# Patient Record
Sex: MALE | Race: WHITE | NOT HISPANIC OR LATINO | Employment: OTHER | URBAN - METROPOLITAN AREA
[De-identification: names, ages, dates, MRNs, and addresses within clinical notes are randomized per-mention and may not be internally consistent; named-entity substitution may affect disease eponyms.]

---

## 2017-03-16 DIAGNOSIS — N20.0 KIDNEY STONE: Primary | ICD-10-CM

## 2017-03-17 ENCOUNTER — TELEPHONE (OUTPATIENT)
Dept: PHARMACY | Facility: CLINIC | Age: 77
End: 2017-03-17

## 2017-03-17 NOTE — TELEPHONE ENCOUNTER
Called and spoke to patient to schedule MTM visit. He would like to meet with MTM annually. Will call to schedule appointment in October.     Tamra Israel, PharmD  [place] MTM Resident  [phone]

## 2017-05-10 ENCOUNTER — HOSPITAL ENCOUNTER (OUTPATIENT)
Dept: GENERAL RADIOLOGY | Facility: CLINIC | Age: 77
Discharge: HOME | End: 2017-05-10
Attending: UROLOGY | Admitting: UROLOGY
Payer: MEDICARE

## 2017-05-10 ENCOUNTER — OFFICE VISIT (OUTPATIENT)
Dept: UROLOGY | Facility: CLINIC | Age: 77
End: 2017-05-10
Payer: COMMERCIAL

## 2017-05-10 VITALS
WEIGHT: 165 LBS | SYSTOLIC BLOOD PRESSURE: 126 MMHG | HEIGHT: 68 IN | HEART RATE: 74 BPM | BODY MASS INDEX: 25.01 KG/M2 | DIASTOLIC BLOOD PRESSURE: 66 MMHG

## 2017-05-10 DIAGNOSIS — Z87.442 HISTORY OF RENAL CALCULI: ICD-10-CM

## 2017-05-10 DIAGNOSIS — N20.0 KIDNEY STONE: ICD-10-CM

## 2017-05-10 DIAGNOSIS — Z87.442 HISTORY OF RENAL CALCULI: Primary | ICD-10-CM

## 2017-05-10 LAB
ALBUMIN UR-MCNC: 30 MG/DL
APPEARANCE UR: CLEAR
BILIRUB UR QL STRIP: NEGATIVE
COLOR UR AUTO: YELLOW
GLUCOSE UR STRIP-MCNC: NEGATIVE MG/DL
HGB UR QL STRIP: NEGATIVE
KETONES UR STRIP-MCNC: NEGATIVE MG/DL
LEUKOCYTE ESTERASE UR QL STRIP: NEGATIVE
NITRATE UR QL: NEGATIVE
PH UR STRIP: 5.5 PH (ref 5–7)
SP GR UR STRIP: 1.02 (ref 1–1.03)
URN SPEC COLLECT METH UR: ABNORMAL
UROBILINOGEN UR STRIP-ACNC: 0.2 EU/DL (ref 0.2–1)

## 2017-05-10 PROCEDURE — 99212 OFFICE O/P EST SF 10 MIN: CPT | Performed by: UROLOGY

## 2017-05-10 PROCEDURE — 81003 URINALYSIS AUTO W/O SCOPE: CPT | Performed by: UROLOGY

## 2017-05-10 PROCEDURE — 74010 XR KUB: CPT | Mod: 52

## 2017-05-10 RX ORDER — FENOFIBRATE 134 MG/1
CAPSULE ORAL
Refills: 1 | COMMUNITY
Start: 2017-03-06 | End: 2018-01-15

## 2017-05-10 ASSESSMENT — PAIN SCALES - GENERAL: PAINLEVEL: NO PAIN (0)

## 2017-05-10 NOTE — MR AVS SNAPSHOT
"              After Visit Summary   5/10/2017    Michael [facility]    MRN: [id number]           Patient Information     Date Of Birth          1940        Visit Information        Provider Department      5/10/2017 2:50 PM Giovanny Ross MD [facility] Urology [facility]        Today's Diagnoses     History of renal calculi           Follow-ups after your visit        Follow-up notes from your care team     Return in about 1 year (around 5/10/2018) for KUB.      Future tests that were ordered for you today     Open Future Orders        Priority Expected Expires Ordered    XR KUB [[id number]] Routine 5/10/2018 5/10/2018 5/10/2017            Who to contact     If you have questions or need follow up information about today's clinic visit or your schedule please contact [facility] UROLOGY [facility] directly at [phone].  Normal or non-critical lab and imaging results will be communicated to you by MyChart, letter or phone within 4 business days after the clinic has received the results. If you do not hear from us within 7 days, please contact the clinic through [employer] or phone. If you have a critical or abnormal lab result, we will notify you by phone as soon as possible.  Submit refill requests through [employer] or call your pharmacy and they will forward the refill request to us. Please allow 3 business days for your refill to be completed.          Additional Information About Your Visit        MyChart Information     [employer] lets you send messages to your doctor, view your test results, renew your prescriptions, schedule appointments and more. To sign up, go to [email or web address] . Click on \"Log in\" on the left side of the screen, which will take you to the Welcome page. Then click on \"Sign up Now\" on the right side of the page.     You will be asked to enter the access code listed below, as well as some personal information. Please follow the directions to " "create your username and password.     Your access code is: [email or web address]  Expires: 2017  3:18 PM     Your access code will  in 90 days. If you need help or a new code, please call your [facility] or [phone].        Care EveryWhere ID     This is your Care EveryWhere ID. This could be used by other organizations to access your [place] medical records  [id number]        Your Vitals Were     Pulse Height BMI (Body Mass Index)             74 1.727 m (5' 8\") 25.09 kg/m2          Blood Pressure from Last 3 Encounters:   05/10/17 126/66   11/10/16 138/64   10/14/16 122/68    Weight from Last 3 Encounters:   05/10/17 74.8 kg (165 lb)   11/10/16 75 kg (165 lb 6 oz)   10/14/16 77.6 kg (171 lb)              We Performed the Following     UA without Microscopic        Primary Care Provider Office Phone # Fax #    Eric Sutton [phone]       [facility] [address]  [facility] [address]        Thank you!     Thank you for choosing [facility]OLOG[facility] CLIN[facility] [place]  for your care. Our goal is always to provide you with excellent care. Hearing back from our patients is one way we can continue to improve our services. Please take a few minutes to complete the written survey that you may receive in the mail after your visit with us. Thank you!             Your Updated Medication List - Protect others around you: Learn how to safely use, store and throw away your medicines at www.disposemymeds.org.          This list is accurate as of: 5/10/17  3:18 PM.  Always use your most recent med list.                   Brand Name Dispense Instructions for use    AMLODIPINE BESYLATE PO      Take 2.5 mg by mouth At Bedtime       ASPIRIN PO      Take 81 mg by mouth daily       CITALOPRAM HYDROBROMIDE PO      Take 20 mg by mouth daily.       CRESTOR PO      Take 40 mg by mouth At Bedtime       Fenofibrate Micronized 134 MG Caps      TK 1 C PO D       FENOFIBRATE " PO      Take 134 mg by mouth daily       HYDROCHLOROTHIAZIDE PO      Take 25 mg by mouth daily       LISINOPRIL PO      Take 40 mg by mouth daily.       METFORMIN HCL PO      Take 1,000 mg by mouth 2 times daily (with meals).       OMEPRAZOLE PO      Take 20 mg by mouth every morning       RANITIDINE HCL PO      Take 300 mg by mouth At Bedtime.

## 2017-05-10 NOTE — LETTER
5/10/2017     RE: Michael Howard  [address] [place] [address]     Dear Colleague,    Thank you for referring your patient, Michael [facility], to the [facility] [place] at [facility]. Please see a copy of my visit note below.    Michael [facility] is a 76-year-old male with a history of calcium urolithiasis. He underwent TURP for benign disease last year and is voiding well. His KUB shows a 3 mm stone in the mid to lower pole on the left side and 2 stones in the peripheral calyces on the right side. He is having no hematuria or flank pain.  Other past medical history: Benign colon polyps, arrhythmia, Wright esophagus, cataracts, chronic renal disease, diabetes, diverticulitis, Dupuytren's contracture, GERD, hyperlipidemia, hypertension, history of depression, right inguinal hernia, nonsmoker  Medications: Amlodipine, aspirin, citalopram, fenofibrate, hydrochlorothiazide, lisinopril, metformin, omeprazole, ranitidine, Crestor  Allergies: Cipro  Exam: Normal appearance, normal vital signs, alert and oriented, normocephalic, normal respirations, neuro grossly intact. Normal sphincter tone, no rectal mass or impaction, benign prostate, normal seminal vesicles  Urinalysis: pH 5.5, sp gr 1.025  Assessment: History of calcium oxalate monohydrate stones, BPH, erectile dysfunction  Plan: Levitra 20 mg-one half-1 p.o. on demand            Follow-up with KUB in 1 year            Yearly digital rectal exam    Again, thank you for allowing me to participate in the care of your patient.      Sincerely,    Giovanny Ross MD

## 2017-05-10 NOTE — NURSING NOTE
Chief Complaint   Patient presents with     Clinic Care Coordination - Follow-up     History of Renal Calculi     Marcela Armstrong LPN

## 2017-05-10 NOTE — PROGRESS NOTES
Michael [facility] is a 76-year-old male with a history of calcium urolithiasis. He underwent TURP for benign disease last year and is voiding well. His KUB shows a 3 mm stone in the mid to lower pole on the left side and 2 stones in the peripheral calyces on the right side. He is having no hematuria or flank pain.  Other past medical history: Benign colon polyps, arrhythmia, Wright esophagus, cataracts, chronic renal disease, diabetes, diverticulitis, Dupuytren's contracture, GERD, hyperlipidemia, hypertension, history of depression, right inguinal hernia, nonsmoker  Medications: Amlodipine, aspirin, citalopram, fenofibrate, hydrochlorothiazide, lisinopril, metformin, omeprazole, ranitidine, Crestor  Allergies: Cipro  Exam: Normal appearance, normal vital signs, alert and oriented, normocephalic, normal respirations, neuro grossly intact. Normal sphincter tone, no rectal mass or impaction, benign prostate, normal seminal vesicles  Urinalysis: pH 5.5, sp gr 1.025  Assessment: History of calcium oxalate monohydrate stones, BPH, erectile dysfunction  Plan: Levitra 20 mg-one half-1 p.o. on demand            Follow-up with KUB in 1 year            Yearly digital rectal exam

## 2017-07-05 ENCOUNTER — TELEPHONE (OUTPATIENT)
Dept: UROLOGY | Facility: CLINIC | Age: 77
End: 2017-07-05

## 2017-07-05 NOTE — TELEPHONE ENCOUNTER
Returning patient's phone call.  He spoke with the schedulers about making an appointment with Dr. Ross.  He can not get in for almost one month.  I recommended he go to his primary MD.  Lynda Schuler LPN

## 2017-07-28 ENCOUNTER — HOSPITAL ENCOUNTER (EMERGENCY)
Facility: CLINIC | Age: 77
Discharge: HOME | End: 2017-07-28
Attending: EMERGENCY MEDICINE | Admitting: EMERGENCY MEDICINE
Payer: MEDICARE

## 2017-07-28 ENCOUNTER — APPOINTMENT (OUTPATIENT)
Dept: CT IMAGING | Facility: CLINIC | Age: 77
End: 2017-07-28
Attending: EMERGENCY MEDICINE
Payer: MEDICARE

## 2017-07-28 VITALS
OXYGEN SATURATION: 99 % | RESPIRATION RATE: 16 BRPM | SYSTOLIC BLOOD PRESSURE: 141 MMHG | DIASTOLIC BLOOD PRESSURE: 65 MMHG | HEART RATE: 72 BPM | TEMPERATURE: 98.6 F

## 2017-07-28 DIAGNOSIS — N20.1 CALCULUS OF RIGHT URETER: ICD-10-CM

## 2017-07-28 DIAGNOSIS — N28.9 RENAL INSUFFICIENCY: ICD-10-CM

## 2017-07-28 LAB
ALBUMIN UR-MCNC: 30 MG/DL
ANION GAP SERPL CALCULATED.3IONS-SCNC: 12 MMOL/L (ref 3–14)
APPEARANCE UR: CLEAR
BASOPHILS # BLD AUTO: 0 10E9/L (ref 0–0.2)
BASOPHILS NFR BLD AUTO: 0.4 %
BILIRUB UR QL STRIP: NEGATIVE
BUN SERPL-MCNC: 37 MG/DL (ref 7–30)
CALCIUM SERPL-MCNC: 9.8 MG/DL (ref 8.5–10.1)
CHLORIDE SERPL-SCNC: 103 MMOL/L (ref 94–109)
CO2 SERPL-SCNC: 23 MMOL/L (ref 20–32)
COLOR UR AUTO: ABNORMAL
CREAT SERPL-MCNC: 1.95 MG/DL (ref 0.66–1.25)
DIFFERENTIAL METHOD BLD: ABNORMAL
EOSINOPHIL # BLD AUTO: 0 10E9/L (ref 0–0.7)
EOSINOPHIL NFR BLD AUTO: 0.3 %
ERYTHROCYTE [DISTWIDTH] IN BLOOD BY AUTOMATED COUNT: 13.7 % (ref 10–15)
GFR SERPL CREATININE-BSD FRML MDRD: 34 ML/MIN/1.7M2
GLUCOSE SERPL-MCNC: 124 MG/DL (ref 70–99)
GLUCOSE UR STRIP-MCNC: NEGATIVE MG/DL
HCT VFR BLD AUTO: 35 % (ref 40–53)
HGB BLD-MCNC: 12 G/DL (ref 13.3–17.7)
HGB UR QL STRIP: ABNORMAL
IMM GRANULOCYTES # BLD: 0 10E9/L (ref 0–0.4)
IMM GRANULOCYTES NFR BLD: 0.3 %
KETONES UR STRIP-MCNC: NEGATIVE MG/DL
LEUKOCYTE ESTERASE UR QL STRIP: NEGATIVE
LYMPHOCYTES # BLD AUTO: 0.7 10E9/L (ref 0.8–5.3)
LYMPHOCYTES NFR BLD AUTO: 6.6 %
MCH RBC QN AUTO: 29.1 PG (ref 26.5–33)
MCHC RBC AUTO-ENTMCNC: 34.3 G/DL (ref 31.5–36.5)
MCV RBC AUTO: 85 FL (ref 78–100)
MONOCYTES # BLD AUTO: 0.7 10E9/L (ref 0–1.3)
MONOCYTES NFR BLD AUTO: 6.5 %
MUCOUS THREADS #/AREA URNS LPF: PRESENT /LPF
NEUTROPHILS # BLD AUTO: 8.9 10E9/L (ref 1.6–8.3)
NEUTROPHILS NFR BLD AUTO: 85.9 %
NITRATE UR QL: NEGATIVE
NRBC # BLD AUTO: 0 10*3/UL
NRBC BLD AUTO-RTO: 0 /100
PH UR STRIP: 5.5 PH (ref 5–7)
PLATELET # BLD AUTO: 299 10E9/L (ref 150–450)
POTASSIUM SERPL-SCNC: 3.9 MMOL/L (ref 3.4–5.3)
RBC # BLD AUTO: 4.13 10E12/L (ref 4.4–5.9)
RBC #/AREA URNS AUTO: 2 /HPF (ref 0–2)
SODIUM SERPL-SCNC: 138 MMOL/L (ref 133–144)
SP GR UR STRIP: 1.01 (ref 1–1.03)
URN SPEC COLLECT METH UR: ABNORMAL
UROBILINOGEN UR STRIP-MCNC: NORMAL MG/DL (ref 0–2)
WBC # BLD AUTO: 10.4 10E9/L (ref 4–11)
WBC #/AREA URNS AUTO: 1 /HPF (ref 0–2)

## 2017-07-28 PROCEDURE — 80048 BASIC METABOLIC PNL TOTAL CA: CPT | Performed by: EMERGENCY MEDICINE

## 2017-07-28 PROCEDURE — 99285 EMERGENCY DEPT VISIT HI MDM: CPT | Mod: 25

## 2017-07-28 PROCEDURE — 85025 COMPLETE CBC W/AUTO DIFF WBC: CPT | Performed by: EMERGENCY MEDICINE

## 2017-07-28 PROCEDURE — 96360 HYDRATION IV INFUSION INIT: CPT

## 2017-07-28 PROCEDURE — 25000128 H RX IP 250 OP 636: Performed by: EMERGENCY MEDICINE

## 2017-07-28 PROCEDURE — 74176 CT ABD & PELVIS W/O CONTRAST: CPT

## 2017-07-28 PROCEDURE — 81001 URINALYSIS AUTO W/SCOPE: CPT | Performed by: EMERGENCY MEDICINE

## 2017-07-28 RX ORDER — TAMSULOSIN HYDROCHLORIDE 0.4 MG/1
0.4 CAPSULE ORAL DAILY
Qty: 7 CAPSULE | Refills: 0 | Status: SHIPPED | OUTPATIENT
Start: 2017-07-28 | End: 2017-08-04

## 2017-07-28 RX ORDER — SODIUM CHLORIDE 9 MG/ML
1000 INJECTION, SOLUTION INTRAVENOUS CONTINUOUS
Status: DISCONTINUED | OUTPATIENT
Start: 2017-07-28 | End: 2017-07-28 | Stop reason: HOSPADM

## 2017-07-28 RX ORDER — OXYCODONE AND ACETAMINOPHEN 5; 325 MG/1; MG/1
1-2 TABLET ORAL EVERY 6 HOURS PRN
Qty: 21 TABLET | Refills: 0 | Status: SHIPPED | OUTPATIENT
Start: 2017-07-28 | End: 2018-01-15

## 2017-07-28 RX ADMIN — SODIUM CHLORIDE 1000 ML: 9 INJECTION, SOLUTION INTRAVENOUS at 13:06

## 2017-07-28 ASSESSMENT — ENCOUNTER SYMPTOMS
NAUSEA: 0
FEVER: 0
HEMATURIA: 0
FLANK PAIN: 1
VOMITING: 0
FREQUENCY: 0
ABDOMINAL PAIN: 1
DYSURIA: 0

## 2017-07-28 NOTE — DISCHARGE INSTRUCTIONS
Treating Kidney Stones: Expectant Therapy  Most kidney stones are about the size of a grape seed. Stones of this size are small enough to pass naturally. Once it is passed, a stone can be analyzed. This wait-and-see approach is called expectant therapy. Small stones can often be passed with expectant therapy. If pain is a problem, ask your healthcare provider about pain medicines. Then follow his or her directions on how much water to drink. Drinking more water creates more urine to flush out your stone. Also be sure to strain your urine. Take any stones you pass to your provider for analysis.    Drink lots of water  Drinking lots of water may help your stone pass. Water also dilutes the chemicals in your urine. This reduces your risk of forming new stones. You may be told to drink 8, 12-ounce glasses of water a day. Avoid liquids that dehydrate you, such as those containing caffeine or alcohol.  Strain your urine  Straining your urine lets you collect your stone for analysis. Use the strainer each time you urinate. Strain your urine for as long as your healthcare provider suggests. Watch for brown, tan, gold, or black specks or tiny leena. These may be kidney stones.  Take your medicine  Your healthcare provider may give you medicine that makes it more likely for you to pass the kidney stone.   Follow up with your healthcare provider  Follow up by taking any stones you find to your provider for analysis. The type of stone you have determines your diet and prevention program. You may need more tests in the future. These tests will ensure that new stones are not forming.  Date Last Reviewed: 1/1/2017 2000-2017 [employer]. [address] [place] [address], [place] [address]. All rights reserved. This information is not intended as a substitute for professional medical care. Always follow your healthcare professional's instructions.

## 2017-07-28 NOTE — ED AVS SNAPSHOT
Emergency Department    [address] [facility] [address]    Phone:  [phone]    Fax:  [phone]                                       Michael [facility]   MRN: [id number]    Department:   Emergency Department   Date of Visit:  7/28/2017           Patient Information     Date Of Birth          1940        Your diagnoses for this visit were:     Calculus of right ureter     Renal insufficiency        You were seen by Michael Ta DO.      Follow-up Information     Follow up with Giovanny Ross MD In 3 days.    Specialty:  Urology    Contact information:    [facility] UROLOGY  [address] [facility] [address] [address] [phone]          Follow up with  Emergency Department.    Specialty:  EMERGENCY MEDICINE    Why:  If symptoms worsen    Contact information:    [address] [facility] [address] [phone]        Discharge Instructions         Treating Kidney Stones: Expectant Therapy  Most kidney stones are about the size of a grape seed. Stones of this size are small enough to pass naturally. Once it is passed, a stone can be analyzed. This wait-and-see approach is called expectant therapy. Small stones can often be passed with expectant therapy. If pain is a problem, ask your healthcare provider about pain medicines. Then follow his or her directions on how much water to drink. Drinking more water creates more urine to flush out your stone. Also be sure to strain your urine. Take any stones you pass to your provider for analysis.    Drink lots of water  Drinking lots of water may help your stone pass. Water also dilutes the chemicals in your urine. This reduces your risk of forming new stones. You may be told to drink 8, 12-ounce glasses of water a day. Avoid liquids that dehydrate you, such as those containing caffeine or alcohol.  Strain your urine  Straining your urine lets you collect your stone for analysis. Use the strainer each time  you urinate. Strain your urine for as long as your healthcare provider suggests. Watch for brown, tan, gold, or black specks or tiny leena. These may be kidney stones.  Take your medicine  Your healthcare provider may give you medicine that makes it more likely for you to pass the kidney stone.   Follow up with your healthcare provider  Follow up by taking any stones you find to your provider for analysis. The type of stone you have determines your diet and prevention program. You may need more tests in the future. These tests will ensure that new stones are not forming.  Date Last Reviewed: 1/1/2017 2000-2017 [employer]. [address] [place], [place] [address]. All rights reserved. This information is not intended as a substitute for professional medical care. Always follow your healthcare professional's instructions.          Future Appointments        Provider Department Dept Phone Center    8/2/2017 2:50 PM Giovanny Ross MD [facility]rology [facility] [place] [phone] [facility]      24 Hour Appointment Hotline       To make an appointment at any [facility], call [phone] ([phone]). If you don't have a family doctor or clinic, we will help you find one. [facility] are conveniently located to serve the needs of you and your family.             Review of your medicines      START taking        Dose / Directions Last dose taken    oxyCODONE-acetaminophen 5-325 MG per tablet   Commonly known as:  PERCOCET   Dose:  1-2 tablet   Quantity:  21 tablet        Take 1-2 tablets by mouth every 6 hours as needed for moderate to severe pain   Refills:  0        tamsulosin 0.4 MG capsule   Commonly known as:  FLOMAX   Dose:  0.4 mg   Quantity:  7 capsule        Take 1 capsule (0.4 mg) by mouth daily for 7 doses   Refills:  0          Our records show that you are taking the medicines listed below. If these are incorrect, please call your family doctor or  clinic.        Dose / Directions Last dose taken    AMLODIPINE BESYLATE PO   Dose:  2.5 mg        Take 2.5 mg by mouth At Bedtime   Refills:  0        ASPIRIN PO   Dose:  81 mg        Take 81 mg by mouth daily   Refills:  0        CITALOPRAM HYDROBROMIDE PO   Dose:  20 mg        Take 20 mg by mouth daily.   Refills:  0        CRESTOR PO   Dose:  40 mg        Take 40 mg by mouth At Bedtime   Refills:  0        Fenofibrate Micronized 134 MG Caps        TK 1 C PO D   Refills:  1        FENOFIBRATE PO   Dose:  134 mg        Take 134 mg by mouth daily   Refills:  0        HYDROCHLOROTHIAZIDE PO   Dose:  25 mg        Take 25 mg by mouth daily   Refills:  0        LISINOPRIL PO   Dose:  40 mg        Take 40 mg by mouth daily.   Refills:  0        METFORMIN HCL PO   Dose:  1000 mg        Take 1,000 mg by mouth 2 times daily (with meals).   Refills:  0        OMEPRAZOLE PO   Dose:  20 mg        Take 20 mg by mouth every morning   Refills:  0        RANITIDINE HCL PO   Dose:  300 mg        Take 300 mg by mouth At Bedtime.   Refills:  0                Prescriptions were sent or printed at these locations (2 Prescriptions)                   Other Prescriptions                Printed at Department/Unit printer (2 of 2)         tamsulosin (FLOMAX) 0.4 MG capsule               oxyCODONE-acetaminophen (PERCOCET) 5-325 MG per tablet                Procedures and tests performed during your visit     Abd/pelvis CT no contrast - Stone Protocol    Basic metabolic panel    CBC with platelets differential    UA with Microscopic reflex to Culture      Orders Needing Specimen Collection     None      Pending Results     No orders found from 7/26/2017 to 7/29/2017.            Pending Culture Results     No orders found from 7/26/2017 to 7/29/2017.            Pending Results Instructions     If you had any lab results that were not finalized at the time of your Discharge, you can call the ED Lab Result RN at [phone]. You will be  contacted by this team for any positive Lab results or changes in treatment. The nurses are available 7 days a week from 10A to 6:30P.  You can leave a message 24 hours per day and they will return your call.        Test Results From Your Hospital Stay        7/28/2017 12:55 PM      Component Results     Component Value Ref Range & Units Status    Color Urine Light Yellow  Final    Appearance Urine Clear  Final    Glucose Urine Negative NEG mg/dL Final    Bilirubin Urine Negative NEG Final    Ketones Urine Negative NEG mg/dL Final    Specific Gravity Urine 1.012 1.003 - 1.035 Final    Blood Urine Trace (A) NEG Final    pH Urine 5.5 5.0 - 7.0 pH Final    Protein Albumin Urine 30 (A) NEG mg/dL Final    Urobilinogen mg/dL Normal 0.0 - 2.0 mg/dL Final    Nitrite Urine Negative NEG Final    Leukocyte Esterase Urine Negative NEG Final    Source Midstream Urine  Final    WBC Urine 1 0 - 2 /HPF Final    RBC Urine 2 0 - 2 /HPF Final    Mucous Urine Present (A) NEG /LPF Final         7/28/2017  1:18 PM      Component Results     Component Value Ref Range & Units Status    WBC 10.4 4.0 - 11.0 10e9/L Final    RBC Count 4.13 (L) 4.4 - 5.9 10e12/L Final    Hemoglobin 12.0 (L) 13.3 - 17.7 g/dL Final    Hematocrit 35.0 (L) 40.0 - 53.0 % Final    MCV 85 78 - 100 fl Final    MCH 29.1 26.5 - 33.0 pg Final    MCHC 34.3 31.5 - 36.5 g/dL Final    RDW 13.7 10.0 - 15.0 % Final    Platelet Count 299 150 - 450 10e9/L Final    Diff Method Automated Method  Final    % Neutrophils 85.9 % Final    % Lymphocytes 6.6 % Final    % Monocytes 6.5 % Final    % Eosinophils 0.3 % Final    % Basophils 0.4 % Final    % Immature Granulocytes 0.3 % Final    Nucleated RBCs 0 0 /100 Final    Absolute Neutrophil 8.9 (H) 1.6 - 8.3 10e9/L Final    Absolute Lymphocytes 0.7 (L) 0.8 - 5.3 10e9/L Final    Absolute Monocytes 0.7 0.0 - 1.3 10e9/L Final    Absolute Eosinophils 0.0 0.0 - 0.7 10e9/L Final    Absolute Basophils 0.0 0.0 - 0.2 10e9/L Final    Abs Immature  Granulocytes 0.0 0 - 0.4 10e9/L Final    Absolute Nucleated RBC 0.0  Final         7/28/2017  1:32 PM      Component Results     Component Value Ref Range & Units Status    Sodium 138 133 - 144 mmol/L Final    Potassium 3.9 3.4 - 5.3 mmol/L Final    Chloride 103 94 - 109 mmol/L Final    Carbon Dioxide 23 20 - 32 mmol/L Final    Anion Gap 12 3 - 14 mmol/L Final    Glucose 124 (H) 70 - 99 mg/dL Final    Urea Nitrogen 37 (H) 7 - 30 mg/dL Final    Creatinine 1.95 (H) 0.66 - 1.25 mg/dL Final    GFR Estimate 34 (L) >60 mL/min/1.7m2 Final    Non  GFR Calc    GFR Estimate If Black 41 (L) >60 mL/min/1.7m2 Final    African American GFR Calc    Calcium 9.8 8.5 - 10.1 mg/dL Final         7/28/2017  1:47 PM      Narrative     CT ABDOMEN AND PELVIS WITHOUT CONTRAST   7/28/2017 1:17 PM     HISTORY: Right flank pain.    COMPARISON: None.    TECHNIQUE: Without intravenous or oral contrast, helical sections were  acquired from the top of the diaphragm through the pubic symphysis.  Coronal reconstructions were generated. Radiation dose for this scan  was reduced using automated exposure control, adjustment of the mA  and/or kV according to the patient's size, or iterative reconstruction  technique. (Renal stone protocol)    FINDINGS:     Right urinary tract: 0.4 cm calculus in the most distal aspect of the  ureter at the ureterovesicular junction. Mild-to-moderate dilatation  of the intrarenal collecting system and ureter. Moderate perinephric  edema. 0.2 cm nonobstructing calculus in the inferior pole of the  kidney.    Left urinary tract: Two tiny 0.2 cm nonobstructing calculi in the  inferior pole of the kidney. No ureteral calculi. No dilatation of the  intrarenal collecting system or ureter.    Urinary bladder: No visualized calculi. Moderate enlargement of the  prostate gland.    Remainder of the abdomen and pelvis: The liver, spleen, pancreas and  adrenal glands are unremarkable to the limits of a noncontrast  CT  scan. A few tiny gallstones in the gallbladder. Moderate-sized hiatal  hernia. The small and large bowel are normal in caliber. A few  diverticula are present in the colon without evidence of  diverticulitis. The appendix is not visualized. No bowel wall  thickening, pneumatosis or free intraperitoneal gas. No enlarged lymph  nodes or free fluid in the pelvis. Atherosclerotic calcification in  the abdominal aorta.    Scan through the lower chest is unremarkable.        Impression     IMPRESSION:   1. 0.4 cm distal right ureteral calculus, resulting in  mild-to-moderate obstruction.  2. A few tiny nonobstructing bilateral renal calculi.  3. Colonic diverticulosis without evidence of diverticulitis.    DESTINY JIMENEZ MD                Clinical Quality Measure: Blood Pressure Screening     Your blood pressure was checked while you were in the emergency department today. The last reading we obtained was  BP: 141/65 . Please read the guidelines below about what these numbers mean and what you should do about them.  If your systolic blood pressure (the top number) is less than 120 and your diastolic blood pressure (the bottom number) is less than 80, then your blood pressure is normal. There is nothing more that you need to do about it.  If your systolic blood pressure (the top number) is 120-139 or your diastolic blood pressure (the bottom number) is 80-89, your blood pressure may be higher than it should be. You should have your blood pressure rechecked within a year by a primary care provider.  If your systolic blood pressure (the top number) is 140 or greater or your diastolic blood pressure (the bottom number) is 90 or greater, you may have high blood pressure. High blood pressure is treatable, but if left untreated over time it can put you at risk for heart attack, stroke, or kidney failure. You should have your blood pressure rechecked by a primary care provider within the next 4 weeks.  If your provider in  "the emergency department today gave you specific instructions to follow-up with your doctor or provider even sooner than that, you should follow that instruction and not wait for up to 4 weeks for your follow-up visit.        Thank you for choosing [facility]       Thank you for choosing [facility] for your care. Our goal is always to provide you with excellent care. Hearing back from our patients is one way we can continue to improve our services. Please take a few minutes to complete the written survey that you may receive in the mail after you visit with us. Thank you!        [employer] Information     [employer] lets you send messages to your doctor, view your test results, renew your prescriptions, schedule appointments and more. To sign up, go to [email or web address] . Click on \"Log in\" on the left side of the screen, which will take you to the Welcome page. Then click on \"Sign up Now\" on the right side of the page.     You will be asked to enter the access code listed below, as well as some personal information. Please follow the directions to create your username and password.     Your access code is: [email or web address]  Expires: 2017  3:18 PM     Your access code will  in 90 days. If you need help or a new code, please call your [facility] clinic or [phone].        Care EveryWhere ID     This is your Care EveryWhere ID. This could be used by other organizations to access your [facility] medical records  [id number]        Equal Access to Services     KAMILLA TOWNSEND : Hadii em benitezo Soelvis, waaxda luqadaha, qaybta kaalmada adeegyada, monica hilliard . So [facility] [phone].    ATENCIÓN: Si habla español, tiene a frost disposición servicios gratuitos de asistencia lingüística. Artis al [phone].    We comply with applicable federal civil rights laws and [place] laws. We do not discriminate on the basis of race, color, national origin, age, disability sex, sexual orientation or " gender identity.            After Visit Summary       This is your record. Keep this with you and show to your community pharmacist(s) and doctor(s) at your next visit.

## 2017-07-28 NOTE — ED AVS SNAPSHOT
Emergency Department    [address] [place] [address]    Phone:  [phone]    Fax:  [phone]                                       Michael Howard   MRN: [id number]    Department:   Emergency Department   Date of Visit:  7/28/2017           After Visit Summary Signature Page     I have received my discharge instructions, and my questions have been answered. I have discussed any challenges I see with this plan with the nurse or doctor.    ..........................................................................................................................................  Patient/Patient Representative Signature      ..........................................................................................................................................  Patient Representative Print Name and Relationship to Patient    ..................................................               ................................................  Date                                            Time    ..........................................................................................................................................  Reviewed by Signature/Title    ...................................................              ..............................................  Date                                                            Time

## 2017-07-28 NOTE — ED PROVIDER NOTES
"  History     Chief Complaint:  Abdominal Pain and Flank Pain    HPI   Michael Howard is a 76 year old male with a history of stage 3 chronic kidney disease, diabetes and kidney stones who presents to the Emergency Department for evaluation of abdominal and flank pain. Prior to arrival he took Tylenol without any relief. The patient reports having intermittent bilateral flank pain with increasing right sided flank pain this morning. Additionally, he complains of lower abdominal pain and feeling as if he \"needs to have a bowel movement\". The patient denies any fevers, nausea, vomiting, urinary frequency/urgency, dysuria or hematuria.    Allergies:  Ciprofloxacin     Medications:     Fenofibrate Micronized 134 MG CAPS  ASPIRIN PO  OMEPRAZOLE PO  AMLODIPINE BESYLATE PO  FENOFIBRATE PO  HYDROCHLOROTHIAZIDE PO  Rosuvastatin Calcium (CRESTOR PO)  RANITIDINE HCL PO  CITALOPRAM HYDROBROMIDE PO  LISINOPRIL PO  METFORMIN HCL PO    Past Medical History:    Adenomatous colon polyp   Arrhythmia   Wright esophagus   Cataract   Chronic kidney disease, stage III (moderate)   Diabetes mellitus (H)   Diarrhea   Diverticulitis   Dupuytren contracture   GERD (gastroesophageal reflux disease)   H/O prostatitis   Hyperlipidemia   Hypertension   Lesion of mouth   Major depression   Right inguinal hernia   Stones, bladder, diverticulum   Urethral stricture   Benign prostatic hyperplasia    Past Surgical History:    Appendectomy  Cardiac surgery  Colonoscopy  Cystoscopy  ENT surgery  Lithotripsy  Hernia repair    Family History:    No past pertinent family history.    Social History:  Smoking Status: never smoker  Alcohol Use: yes  Marital Status:   [5]     Review of Systems   Constitutional: Negative for fever.   Gastrointestinal: Positive for abdominal pain. Negative for nausea and vomiting.   Genitourinary: Positive for flank pain. Negative for dysuria, frequency and hematuria.   All other systems reviewed and are " negative.      Physical Exam   First Vitals:  BP: 162/63  Pulse: 72  Temp: 98.6  F (37  C)  Resp: 16      Patient Vitals for the past 24 hrs:   BP Temp Temp src Pulse Resp SpO2   07/28/17 1345 - - - - - 99 %   07/28/17 1330 141/65 - - - - 100 %   07/28/17 1237 162/63 98.6  F (37  C) Oral 72 16 97 %     Physical Exam  General:  Sitting on bed.   HENT:  No obvious trauma to head  Right Ear:  External ear normal.   Left Ear:  External ear normal.   Nose:  Nose normal.   Eyes:  Conjunctivae and EOM are normal. Pupils are equal, round, and reactive.   Neck: Normal range of motion. Neck supple. No tracheal deviation present.   CV:  Normal heart sounds. No murmur heard.  Pulm/Chest: Effort normal and breath sounds normal.   Abd: Soft. No distension. There is no tenderness. There is no rigidity, no rebound and no guarding. Mild right CVA tenderness, no left CVA tenderness. Negative McBurney's sign. Negative Ayala's sign.  M/S: Normal range of motion.   Neuro: Alert. GCS 15.  Skin: Skin is warm and dry. No rash noted. Not diaphoretic.   Psych: Normal mood and affect. Behavior is normal.     Emergency Department Course     Imaging:  Radiographic findings were communicated with the patient who voiced understanding of the findings.    Abd/Pelvis CT without contrast:  1. 0.4 cm distal right ureteral calculus, resulting in mild-to-moderate obstruction.  2. A few tiny non obstructing bilateral renal calculi.  3. Colonic diverticulosis without evidence of diverticulitis. As per radiology.     Laboratory:  CBC: WBC: 10.4, HGB: 12.0(L), PLT: 299  BMP: Glucose 124(H), BUN 37(H), Creatinine 1.95(H), GFR 34(L), o/w WNL.     UA with Microscopic reflex to culture: Urine blood trace, Protein albumin 30(A), Mucous present, o/w WNL.    Interventions:  1306 NS 1 L IV    Emergency Department Course:  Nursing notes and vitals reviewed. I performed an exam of the patient as documented above.     Blood drawn. This was sent to the lab for further  testing, results above.    The patient provided a urine sample here in the emergency department. This was sent for laboratory testing, findings above.    The patient was sent for a CT abd/pelvis without contrast while here in the emergency department, findings above.    1339 I reassessed the patient.     Findings and plan explained to the Patient. Patient discharged home with instructions regarding supportive care, medications, and reasons to return. The importance of close follow-up was reviewed.     Impression & Plan      Medical Decision Making:  Michael Howard is a very pleasant 76 year old male who presented with lower abdominal pain and bilateral flank pain, worse on the right. This is consistent with renal colic. CT confirms a 0.4 cm ureteral stone at the right.  Renal function is slightly elevated above baseline. His baseline creatinine is around 1.5 and today it's just below 2. The patient was provided IV fluids.  CT and lab workup show no other alternative etiology that could be causing his symptoms (e.g., appendicitis, pyelonephritis). There is no fever or convincing evidence of a urinary tract infection. On recheck, his pain is controlled with interventions in the ED with IV fluids for the most part and he is tolerating POs. Toradol was contraindicated given his age and renal insufficiency and he declined narcotics because he does not have a ride home. I will prescribe supportive medications and Flomax to facilitate stone passage. I prescribed Percocet and he can take 1 of these when he gets home. I advised him to use caution as a cause sedation and he should not drive or operate heavy machinery. I also advised that he not take any Viagra or Cialis products. I have advised him to return for uncontrolled pain, vomiting, fever, or any other concerning symptoms. I also advised to strain his urine to look for a stone and submit it to his primary doctor for lab analysis.  Also advised follow up with primary  care within 3-5 days.    The treatment plan was discussed with the patient and they expressed understanding of this plan and consented to the plan.  In addition, the patient will return to the emergency department if their symptoms persist, worsen, if new symptoms arise or if there is any concern as other pathology may be present that is not evident at this time. They also understand the importance of close follow up in the clinic and if unable to do so will return to the emergency department for a reevaluation. All questions were answered.    Diagnosis:    ICD-10-CM    1. Calculus of right ureter N20.1    2. Renal insufficiency N28.9      Disposition:  discharged to home  Discharge Medications:  New Prescriptions    OXYCODONE-ACETAMINOPHEN (PERCOCET) 5-325 MG PER TABLET    Take 1-2 tablets by mouth every 6 hours as needed for moderate to severe pain    TAMSULOSIN (FLOMAX) 0.4 MG CAPSULE    Take 1 capsule (0.4 mg) by mouth daily for 7 doses       Soco MOREAU, am serving as a scribe on 7/28/2017 at 12:41 PM to personally document services performed by Michael Ta DO based on my observations and the provider's statements to me.     Soco Martinez  7/28/2017    EMERGENCY DEPARTMENT       Michael Ta DO  07/28/17 2064

## 2017-08-02 ENCOUNTER — OFFICE VISIT (OUTPATIENT)
Dept: UROLOGY | Facility: CLINIC | Age: 77
End: 2017-08-02
Payer: COMMERCIAL

## 2017-08-02 VITALS
WEIGHT: 160 LBS | HEIGHT: 68 IN | SYSTOLIC BLOOD PRESSURE: 112 MMHG | BODY MASS INDEX: 24.25 KG/M2 | HEART RATE: 70 BPM | DIASTOLIC BLOOD PRESSURE: 54 MMHG

## 2017-08-02 DIAGNOSIS — N18.30 CHRONIC KIDNEY DISEASE, STAGE III (MODERATE) (H): Primary | ICD-10-CM

## 2017-08-02 DIAGNOSIS — N40.0 BPH (BENIGN PROSTATIC HYPERPLASIA): ICD-10-CM

## 2017-08-02 DIAGNOSIS — N18.30 CHRONIC KIDNEY DISEASE, STAGE III (MODERATE) (H): ICD-10-CM

## 2017-08-02 LAB
ALBUMIN UR-MCNC: 30 MG/DL
APPEARANCE UR: CLEAR
BILIRUB UR QL STRIP: ABNORMAL
COLOR UR AUTO: YELLOW
GLUCOSE UR STRIP-MCNC: NEGATIVE MG/DL
HGB UR QL STRIP: NEGATIVE
KETONES UR STRIP-MCNC: ABNORMAL MG/DL
LEUKOCYTE ESTERASE UR QL STRIP: NEGATIVE
NITRATE UR QL: NEGATIVE
PH UR STRIP: 5 PH (ref 5–7)
SP GR UR STRIP: 1.02 (ref 1–1.03)
URN SPEC COLLECT METH UR: ABNORMAL
UROBILINOGEN UR STRIP-ACNC: 0.2 EU/DL (ref 0.2–1)

## 2017-08-02 PROCEDURE — 51798 US URINE CAPACITY MEASURE: CPT | Performed by: UROLOGY

## 2017-08-02 PROCEDURE — 81003 URINALYSIS AUTO W/O SCOPE: CPT | Performed by: UROLOGY

## 2017-08-02 PROCEDURE — 99212 OFFICE O/P EST SF 10 MIN: CPT | Performed by: UROLOGY

## 2017-08-02 ASSESSMENT — PAIN SCALES - GENERAL: PAINLEVEL: MILD PAIN (3)

## 2017-08-02 NOTE — LETTER
8/2/2017       RE: Michael Howard  [address] [place] [address]     Dear Colleague,    Thank you for referring your patient, Michael Howard, to the [facility] [place] at [facility]. Please see a copy of my visit note below.    Michael Dickinson is a 76-year-old male with a history of calcium urolithiasis. He was in the emergency room 4 days ago with right flank pain and has a 4 mm stone in the distal right ureter, a tiny stone in the left kidney. He is currently on Flomax and straining his urine. No stone has passed any still having some right lower quadrant discomfort, not severe.  Other past medical history: Adenomatous colon polyp, arrhythmia, Wright esophagus, cataract, chronic renal disease, diabetes, diarrhea, diverticulitis, Dupuytren's contracture, GERD, hyperlipidemia, hypertension, depression, right inguinal hernia, bladder stones, urethral stricture, nonsmoker  Medications: Tamsulosin, Crestor, Zantac, omeprazole, metformin, lisinopril, hydrochlorothiazide, fenofibrate, citalopram, vitamin D, low-dose aspirin, amlodipine  Allergies: Cipro  Exam: Normal appearance, normal vital signs, alert and oriented, normocephalic, normal respirations, neuro grossly intact  Assessment: Distal right ureteral calculus-discussed surgical extraction versus observation  Plan: Continue Flomax, strain urine, drink 2-3 L of water daily. Call if unable to pass stone and next few days.      Again, thank you for allowing me to participate in the care of your patient.      Sincerely,    Giovanny Ross MD

## 2017-08-02 NOTE — NURSING NOTE
Chief Complaint   Patient presents with     Kidney Stone Related     Patient is passing a kidney stone. He said this started Friday morning.      Viji Haas LPN 3:00 PM August 2, 2017

## 2017-08-02 NOTE — PROGRESS NOTES
Michael Dickinson is a 76-year-old male with a history of calcium urolithiasis. He was in the emergency room 4 days ago with right flank pain and has a 4 mm stone in the distal right ureter, a tiny stone in the left kidney. He is currently on Flomax and straining his urine. No stone has passed any still having some right lower quadrant discomfort, not severe.  Other past medical history: Adenomatous colon polyp, arrhythmia, Wright esophagus, cataract, chronic renal disease, diabetes, diarrhea, diverticulitis, Dupuytren's contracture, GERD, hyperlipidemia, hypertension, depression, right inguinal hernia, bladder stones, urethral stricture, nonsmoker  Medications: Tamsulosin, Crestor, Zantac, omeprazole, metformin, lisinopril, hydrochlorothiazide, fenofibrate, citalopram, vitamin D, low-dose aspirin, amlodipine  Allergies: Cipro  Exam: Normal appearance, normal vital signs, alert and oriented, normocephalic, normal respirations, neuro grossly intact  Assessment: Distal right ureteral calculus-discussed surgical extraction versus observation  Plan: Continue Flomax, strain urine, drink 2-3 L of water daily. Call if unable to pass stone and next few days.

## 2017-08-02 NOTE — MR AVS SNAPSHOT
"              After Visit Summary   2017    Michael Howard    MRN: [id number]           Patient Information     Date Of Birth          1940        Visit Information        Provider Department      2017 2:50 PM Giovanny Ross MD [facility] [place]        Today's Diagnoses     Chronic kidney disease, stage III (moderate)        BPH (benign prostatic hyperplasia)           Follow-ups after your visit        Who to contact     If you have questions or need follow up information about today's clinic visit or your schedule please contact [facility] [place] directly at [phone].  Normal or non-critical lab and imaging results will be communicated to you by Easy Taxihart, letter or phone within 4 business days after the clinic has received the results. If you do not hear from us within 7 days, please contact the clinic through Easy Taxihart or phone. If you have a critical or abnormal lab result, we will notify you by phone as soon as possible.  Submit refill requests through Magic Leap or call your pharmacy and they will forward the refill request to us. Please allow 3 business days for your refill to be completed.          Additional Information About Your Visit        MyChart Information     Magic Leap lets you send messages to your doctor, view your test results, renew your prescriptions, schedule appointments and more. To sign up, go to [email or web address] . Click on \"Log in\" on the left side of the screen, which will take you to the Welcome page. Then click on \"Sign up Now\" on the right side of the page.     You will be asked to enter the access code listed below, as well as some personal information. Please follow the directions to create your username and password.     Your access code is: [email or web address]  Expires: 2017  3:18 PM     Your access code will  in 90 days. If you need help or a new code, please call your [place] clinic or " "[phone].        Care EveryWhere ID     This is your Care EveryWhere ID. This could be used by other organizations to access your [facility] medical records  [id number]        Your Vitals Were     Pulse Height BMI (Body Mass Index)             70 1.727 m (5' 8\") 24.33 kg/m2          Blood Pressure from Last 3 Encounters:   08/02/17 112/54   07/28/17 141/65   05/10/17 126/66    Weight from Last 3 Encounters:   08/02/17 72.6 kg (160 lb)   05/10/17 74.8 kg (165 lb)   11/10/16 75 kg (165 lb 6 oz)              We Performed the Following     MEASURE POST-VOID RESIDUAL URINE/BLADDER CAPACITY, US NON-IMAGING     UA without Microscopic        Primary Care Provider Office Phone # Fax #    Eric Sutton [phone]       [facility] [address]  [place] [address]        Equal Access to Services     [facility] : Hadii aad ku hadasho Soomaali, waaxda luqadaha, qaybta kaalmada adeegyada, waxay idiin haymelinan alicja hilliard . So [facility] [phone].    ATENCIÓN: Si habla español, tiene a frost disposición servicios gratuitos de asistencia lingüística. Llame al [phone].    We comply with applicable federal civil rights laws and [place] laws. We do not discriminate on the basis of race, color, national origin, age, disability sex, sexual orientation or gender identity.            Thank you!     Thank you for choosing [facility] [place]  for your care. Our goal is always to provide you with excellent care. Hearing back from our patients is one way we can continue to improve our services. Please take a few minutes to complete the written survey that you may receive in the mail after your visit with us. Thank you!             Your Updated Medication List - Protect others around you: Learn how to safely use, store and throw away your medicines at www.disposemymeds.org.          This list is accurate as of: 8/2/17  3:05 PM.  Always use your most " recent med list.                   Brand Name Dispense Instructions for use Diagnosis    AMLODIPINE BESYLATE PO      Take 2.5 mg by mouth At Bedtime        ASPIRIN PO      Take 81 mg by mouth daily        CITALOPRAM HYDROBROMIDE PO      Take 20 mg by mouth daily.        CRESTOR PO      Take 40 mg by mouth At Bedtime        Fenofibrate Micronized 134 MG Caps      TK 1 C PO D        FENOFIBRATE PO      Take 134 mg by mouth daily        HYDROCHLOROTHIAZIDE PO      Take 25 mg by mouth daily        LISINOPRIL PO      Take 40 mg by mouth daily.        METFORMIN HCL PO      Take 1,000 mg by mouth 2 times daily (with meals).        OMEPRAZOLE PO      Take 20 mg by mouth every morning        oxyCODONE-acetaminophen 5-325 MG per tablet    PERCOCET    21 tablet    Take 1-2 tablets by mouth every 6 hours as needed for moderate to severe pain        RANITIDINE HCL PO      Take 300 mg by mouth At Bedtime.        tamsulosin 0.4 MG capsule    FLOMAX    7 capsule    Take 1 capsule (0.4 mg) by mouth daily for 7 doses        VITAMIN D-3 PO

## 2017-08-21 ENCOUNTER — TELEPHONE (OUTPATIENT)
Dept: UROLOGY | Facility: CLINIC | Age: 77
End: 2017-08-21

## 2017-08-21 NOTE — TELEPHONE ENCOUNTER
Patient called and is having some flank discomfort that has returned. He is not sure if his stone has passed and would like to have a KUB done. Will forward message to MD. Marcela Armstrong LPN

## 2017-08-23 DIAGNOSIS — R10.9 FLANK PAIN: Primary | ICD-10-CM

## 2017-09-01 NOTE — ORAL ONC MGMT
Per MD,  A KUB was ordered for patient. Patient was given scheduling number at [facility] to call to set-up an appt. Marcela Armstrong LPN

## 2017-09-05 ENCOUNTER — HOSPITAL ENCOUNTER (OUTPATIENT)
Dept: GENERAL RADIOLOGY | Facility: CLINIC | Age: 77
Discharge: HOME | End: 2017-09-05
Attending: UROLOGY | Admitting: UROLOGY
Payer: MEDICARE

## 2017-09-05 DIAGNOSIS — R10.9 FLANK PAIN: ICD-10-CM

## 2017-09-05 PROCEDURE — 74010 XR KUB: CPT | Mod: 52

## 2017-12-29 ENCOUNTER — TELEPHONE (OUTPATIENT)
Dept: PHARMACY | Facility: CLINIC | Age: 77
End: 2017-12-29

## 2017-12-30 NOTE — TELEPHONE ENCOUNTER
This patient is due for MTM follow-up. I called the patient to schedule an appointment. Appointment scheduled for Monday January 15th at 10:30am.    Digna Escobar, PharmD  Pharmaceutical Care Resident  Pager: [phone]

## 2018-01-15 ENCOUNTER — OFFICE VISIT (OUTPATIENT)
Dept: PHARMACY | Facility: CLINIC | Age: 78
End: 2018-01-15
Payer: COMMERCIAL

## 2018-01-15 VITALS — DIASTOLIC BLOOD PRESSURE: 60 MMHG | WEIGHT: 171.8 LBS | SYSTOLIC BLOOD PRESSURE: 126 MMHG | BODY MASS INDEX: 26.12 KG/M2

## 2018-01-15 DIAGNOSIS — N52.9 ED (ERECTILE DYSFUNCTION): ICD-10-CM

## 2018-01-15 DIAGNOSIS — I10 BENIGN ESSENTIAL HYPERTENSION: ICD-10-CM

## 2018-01-15 DIAGNOSIS — Z00.00 PREVENTATIVE HEALTH CARE: ICD-10-CM

## 2018-01-15 DIAGNOSIS — K21.9 GASTROESOPHAGEAL REFLUX DISEASE, ESOPHAGITIS PRESENCE NOT SPECIFIED: ICD-10-CM

## 2018-01-15 DIAGNOSIS — R52 INTERMITTENT PAIN: ICD-10-CM

## 2018-01-15 DIAGNOSIS — E11.9 CONTROLLED TYPE 2 DIABETES MELLITUS WITHOUT COMPLICATION, WITHOUT LONG-TERM CURRENT USE OF INSULIN (H): Primary | ICD-10-CM

## 2018-01-15 DIAGNOSIS — F41.9 ANXIETY: ICD-10-CM

## 2018-01-15 DIAGNOSIS — N52.9 ERECTILE DYSFUNCTION, UNSPECIFIED ERECTILE DYSFUNCTION TYPE: ICD-10-CM

## 2018-01-15 DIAGNOSIS — E78.5 HYPERLIPIDEMIA LDL GOAL <100: ICD-10-CM

## 2018-01-15 PROCEDURE — 99607 MTMS BY PHARM ADDL 15 MIN: CPT | Performed by: PHARMACIST

## 2018-01-15 PROCEDURE — 99605 MTMS BY PHARM NP 15 MIN: CPT | Performed by: PHARMACIST

## 2018-01-15 RX ORDER — MULTIVIT-MIN/IRON/FOLIC ACID/K 18-600-40
1 CAPSULE ORAL DAILY
COMMUNITY

## 2018-01-15 RX ORDER — VARDENAFIL HYDROCHLORIDE 20 MG/1
10 TABLET ORAL DAILY PRN
COMMUNITY
End: 2021-11-02 | Stop reason: ALTCHOICE

## 2018-01-15 RX ORDER — ACETAMINOPHEN 500 MG
500 TABLET ORAL EVERY 6 HOURS PRN
COMMUNITY
End: 2021-11-02

## 2018-01-15 NOTE — Clinical Note
I talked to Hanane and she said this antonio could f/up with you now, and he'd prefer to do so.  I'll f/up with him on the outstanding DTP, but do you maybe want to reach out to him in 6 months? Ally Neri, PharmD, [facility] Medication Therapy Management Provider Pager: [phone]

## 2018-01-15 NOTE — PATIENT INSTRUCTIONS
Recommendations from today's MTM visit:                                                    MTM (medication therapy management) is a service provided by a clinical pharmacist designed to help you get the most of out of your medicines.   Today we reviewed what your medicines are for, how to know if they are working, that your medicines are safe and how to make your medicine regimen as easy as possible.     1.  The dose of ranitidine is too high for your kidney function.  I'll try to connect with Dr. Sutton regarding this again.    Next MTM visit:  Follow-up with my colleague, Nimisha Armendariz PharmD, BCACP at the [facility] in 1 year (or sooner if needed)    To schedule another MTM appointment, please call the clinic directly or you may call the MTM scheduling line at [phone] or toll-free at [phone].     My Clinical Pharmacist's contact information:                                                      It was a pleasure seeing you today!  Please feel free to contact me with any questions or concerns you have.      Digna Escobar PharmD  Pharmaceutical Care Resident  Pager: [phone]    Ally Neri PharmD, BCACP  Medication Therapy Management Provider  Pager: [phone]     You may receive a survey about the MTM services you received.  I would appreciate your feedback to help me serve you better in the future. Please fill it out and return it when you can. Your comments will be anonymous.

## 2018-01-15 NOTE — MR AVS SNAPSHOT
After Visit Summary   1/15/2018    Michael Howard    MRN: [id number]           Patient Information     Date Of Birth          1940        Visit Information        Provider Department      1/15/2018 10:30 AM Ally Neri, [facility] MTM        Care Instructions    Recommendations from today's MTM visit:                                                    MTM (medication therapy management) is a service provided by a clinical pharmacist designed to help you get the most of out of your medicines.   Today we reviewed what your medicines are for, how to know if they are working, that your medicines are safe and how to make your medicine regimen as easy as possible.     1.  The dose of ranitidine is too high for your kidney function.  I'll try to connect with Dr. Sutton regarding this again.    Next MTM visit:  Follow-up with my colleague, Nimisha Armendariz, Anatoliy, BCACP at the [facility] in 1 year (or sooner if needed)    To schedule another MTM appointment, please call the clinic directly or you may call the MTM scheduling line at [phone] or toll-free at [phone].     My Clinical Pharmacist's contact information:                                                      It was a pleasure seeing you today!  Please feel free to contact me with any questions or concerns you have.      Digna Escobar PharmD  Pharmaceutical Care Resident  Pager: [phone]    Ally Neri PharmD, BCACP  Medication Therapy Management Provider  Pager: [phone]     You may receive a survey about the MTM services you received.  I would appreciate your feedback to help me serve you better in the future. Please fill it out and return it when you can. Your comments will be anonymous.                     Follow-ups after your visit        Who to contact     If you have questions or need follow up information about today's clinic visit or your schedule please contact [facility]  "CLINIC [facility] directly at [phone].  Normal or non-critical lab and imaging results will be communicated to you by MyChart, letter or phone within 4 business days after the clinic has received the results. If you do not hear from us within 7 days, please contact the clinic through MyChart or phone. If you have a critical or abnormal lab result, we will notify you by phone as soon as possible.  Submit refill requests through [employer] or call your pharmacy and they will forward the refill request to us. Please allow 3 business days for your refill to be completed.          Additional Information About Your Visit        [place] Information     [employer] lets you send messages to your doctor, view your test results, renew your prescriptions, schedule appointments and more. To sign up, go to [email or web address] . Click on \"Log in\" on the left side of the screen, which will take you to the Welcome page. Then click on \"Sign up Now\" on the right side of the page.     You will be asked to enter the access code listed below, as well as some personal information. Please follow the directions to create your username and password.     Your access code is: [email or web address]  Expires: 4/15/2018 10:51 AM     Your access code will  in 90 days. If you need help or a new code, please call your [place] clinic or [phone].        Care EveryWhere ID     This is your Care EveryWhere ID. This could be used by other organizations to access your [place] medical records  [id number]        Your Vitals Were     BMI (Body Mass Index)                   26.12 kg/m2            Blood Pressure from Last 3 Encounters:   01/15/18 126/60   17 112/54   17 141/65    Weight from Last 3 Encounters:   01/15/18 171 lb 12.8 oz (77.9 kg)   17 160 lb (72.6 kg)   05/10/17 165 lb (74.8 kg)              Today, you had the following     No orders found for display       Primary Care Provider Office Phone # Fax #    Eric Sutton MD " [phone]       [facility] [address]  [facility] [address]        Equal Access to Services     KAMILLA TOWNSEND : Hadii em zavala emmanuelo Sodaneali, waaxda luqadaha, qaybta kaalmada adelani, monica yanes oraliamargaux tompkins laJacintophylicia christianson. So [facility] [phone].    ATENCIÓN: Si habla español, tiene a frost disposición servicios gratuitos de asistencia lingüística. Llame al [phone].    We comply with applicable federal civil rights laws and [place] laws. We do not discriminate on the basis of race, color, national origin, age, disability, sex, sexual orientation, or gender identity.            Thank you!     Thank you for choosing [facility]  for your care. Our goal is always to provide you with excellent care. Hearing back from our patients is one way we can continue to improve our services. Please take a few minutes to complete the written survey that you may receive in the mail after your visit with us. Thank you!             Your Updated Medication List - Protect others around you: Learn how to safely use, store and throw away your medicines at www.disposemymeds.org.          This list is accurate as of: 1/15/18 10:51 AM.  Always use your most recent med list.                   Brand Name Dispense Instructions for use Diagnosis    acetaminophen 500 MG tablet    TYLENOL     Take 500 mg by mouth every 6 hours as needed for mild pain        AMLODIPINE BESYLATE PO      Take 10 mg by mouth At Bedtime        ASPIRIN PO      Take 81 mg by mouth daily        CITALOPRAM HYDROBROMIDE PO      Take 20 mg by mouth daily.        CRESTOR PO      Take 40 mg by mouth At Bedtime        FENOFIBRATE PO      Take 134 mg by mouth daily        HYDROCHLOROTHIAZIDE PO      Take 25 mg by mouth daily        LISINOPRIL PO      Take 40 mg by mouth daily.        METFORMIN HCL PO      Take 1,000 mg by mouth 2 times daily (with meals).        OMEPRAZOLE PO      Take 20 mg by mouth every  morning        RANITIDINE HCL PO      Take 300 mg by mouth At Bedtime.        vardenafil 20 MG tablet    LEVITRA     Take 10 mg by mouth daily as needed        Vitamin D (Cholecalciferol) 1000 UNITS Caps      Take 1 capsule by mouth daily

## 2018-01-15 NOTE — PROGRESS NOTES
SUBJECTIVE/OBJECTIVE:                Michael Howard is a 77 year old male coming in for a follow-up visit for Medication Therapy Management.  He was referred to me from [facility], but has since changed insurance plans.  His PCP remains Dr. Sutton.     Chief Complaint: Follow up from our visit on 10/14/16.  This visit serves as an initial visit for 2018.  He has no specific questions or concerns today.    Tobacco: No tobacco use  Alcohol: Less than 1 beverages / week    Medication Adherence: no issues reported    Diabetes:  Pt currently taking metformin 1000mg BID.  Pt is not experiencing side effects.  He reports his last A1c was 6.1% (in November/December).   SMBG: one time daily (AM fasting).   Ranges (patient reported): Right around 100mg/dL, range 90-110mg/dL  Symptoms of low blood sugar? shaky, weak, sweaty. Frequency of hypoglycemia? None recently  Recent symptoms of high blood sugar? none  Unknown if microalbumin is < 30 mg/g. Pt is taking an ACEi/ARB.  Aspirin: Taking 81mg daily and denies side effects    Hypertension: Current medications include amlodipine 10mg daily (dose increased from our last visit), HCTZ 25mg daily, and lisinopril 40mg daily.  Patient does not self-monitor BP.  Patient reports the following medication side effects: none.     Hyperlipidemia: Current therapy includes Crestor 40mg daily and fenofibrate 134mg daily.  Pt reports no significant myalgias or other side effects.  Unable to calculate ASCVD risk without lipid panel on file.  He tells me he talked with Dr. Sutton about discontinuing fenofibrate after our last visit, but they decided to keep him on it.    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  Current medications include: omeprazole 20mg daily and ranitidine 300mg daily. Pt c/o no current symptoms.  Patient feels that current regimen is effective.  At our last visit we discussed that ranitidine dose was too high for his level of renal  function, I sent Dr. Sutton a few messages about this but never heard back.      Anxiety:  Current medications include: citalopram 20mg daily.  He feels this works well and he denies side effects of therapy.  He denies any trouble with his anxiety at this time.    Pain:  He occasionally takes APAP 500mg for pain, which he finds effective.  He tries to keep use to a minimum.    ED:  He just recently received vardenafil from an online pharmacy (he tells me one of his other providers recommended this).  He has been taking 10mg as needed and has found this effective.  He denies side effects.    Bone Health:  He doesn't drink much milk, and doesn't think he's had a bone scan done.  He tells me today that he's prone to getting kidney stones to he and Dr. Sutton decided to not start a calcium supplement as we had discussed at our last visit.  He does take Vitamin D 1000 IU daily.    Current labs include:BP Readings from Last 3 Encounters:   08/02/17 112/54   07/28/17 141/65   05/10/17 126/66     Today's Vitals: /60  Wt 171 lb 12.8 oz (77.9 kg)  BMI 26.12 kg/m2     Last Basic Metabolic Panel:  Lab Results   Component Value Date     07/28/2017      Lab Results   Component Value Date    POTASSIUM 3.9 07/28/2017     Lab Results   Component Value Date    CHLORIDE 103 07/28/2017     Lab Results   Component Value Date    BUN 37 07/28/2017     Lab Results   Component Value Date    CR 1.95 07/28/2017      Estimated CrCl - 27-35ml/min (depending on method used)    GFR Estimate   Date Value Ref Range Status   07/28/2017 34 (L) >60 mL/min/1.7m2 Final     Comment:     Non  GFR Calc   02/03/2016 46 (L) >60 mL/min/1.7m2 Final     Comment:     Non  GFR Calc   10/09/2013 47 (L) >60 mL/min/1.7m2 Final       Most Recent Immunizations   Administered Date(s) Administered     Influenza (High Dose) 3 valent vaccine 10/05/2016       ASSESSMENT:              Current medications were reviewed today as  discussed above.      Medication Adherence: no issues identified    Diabetes: Stable. Patient is meeting A1c goal of < 7%.     Hypertension: Stable. Patient is meeting BP goal of < 140/90mmHg.      Hyperlipidemia: Stable. Pt is on high intensity statin which is indicated based on 2013 ACC/AHA guidelines for lipid management.  As discussed at our last visit, he likely doesn't need fibrate therapy but PCP wanted to keep him on it.    GERD: Needs improvement.  Last SrCr in [facility] was a while ago (July) so estimated CrCl has likely varied since that time, but ranitidine dose is too high for his renal function.     Anxiety: Stable.    Pain:  Stable.    ED:  Stable, but would not recommend he get medications from pharmacies outside of the [place].    Bone Health:  Stable.       PLAN:                1.  Recommended ranitidine dose for his renal function would be 150mg once daily if CrCl 30-50ml/min or 75mg BID if CrCl is <30ml/min.    2.  Discussed risks of using non-[place] based pharmacies.    I spent 30 minutes with this patient today. A copy of the visit note was provided to the patient's primary care provider.     Will follow up in after talking with Dr. Sutton.  Pt prefers to follow-up with Nimisha Armendariz, Anatoliy, BCACP at [facility].    The patient was given a summary of these recommendations as an after visit summary.    Ally Neri, PharmD, BCACP  Medication Therapy Management Provider  Pager: [phone]

## 2018-01-31 NOTE — PROGRESS NOTES
No response from Dr. Sutton, re-facasey.    Ally Neri, PharmD, [facility]  Medication Therapy Management Provider  Pager: [phone]

## 2018-02-14 ENCOUNTER — MEDICAL CORRESPONDENCE (OUTPATIENT)
Dept: ULTRASOUND IMAGING | Facility: CLINIC | Age: 78
End: 2018-02-14

## 2018-02-15 ENCOUNTER — HOSPITAL ENCOUNTER (OUTPATIENT)
Dept: ULTRASOUND IMAGING | Facility: CLINIC | Age: 78
Discharge: HOME | End: 2018-02-15
Attending: INTERNAL MEDICINE | Admitting: INTERNAL MEDICINE
Payer: MEDICARE

## 2018-02-15 DIAGNOSIS — N18.4 CHRONIC KIDNEY DISEASE, STAGE IV (SEVERE) (H): ICD-10-CM

## 2018-02-15 PROCEDURE — 76770 US EXAM ABDO BACK WALL COMP: CPT

## 2018-02-18 ENCOUNTER — HOSPITAL ENCOUNTER (EMERGENCY)
Facility: CLINIC | Age: 78
Discharge: HOME | End: 2018-02-19
Attending: EMERGENCY MEDICINE | Admitting: EMERGENCY MEDICINE
Payer: MEDICARE

## 2018-02-18 ENCOUNTER — APPOINTMENT (OUTPATIENT)
Dept: GENERAL RADIOLOGY | Facility: CLINIC | Age: 78
End: 2018-02-18
Attending: EMERGENCY MEDICINE
Payer: MEDICARE

## 2018-02-18 DIAGNOSIS — J10.1 INFLUENZA A: ICD-10-CM

## 2018-02-18 DIAGNOSIS — N18.30 CKD (CHRONIC KIDNEY DISEASE) STAGE 3, GFR 30-59 ML/MIN (H): ICD-10-CM

## 2018-02-18 LAB
ANION GAP SERPL CALCULATED.3IONS-SCNC: 9 MMOL/L (ref 3–14)
BASOPHILS # BLD AUTO: 0 10E9/L (ref 0–0.2)
BASOPHILS NFR BLD AUTO: 0.2 %
BUN SERPL-MCNC: 28 MG/DL (ref 7–30)
CALCIUM SERPL-MCNC: 9.3 MG/DL (ref 8.5–10.1)
CHLORIDE SERPL-SCNC: 101 MMOL/L (ref 94–109)
CO2 SERPL-SCNC: 25 MMOL/L (ref 20–32)
CREAT SERPL-MCNC: 2.07 MG/DL (ref 0.66–1.25)
DIFFERENTIAL METHOD BLD: ABNORMAL
EOSINOPHIL # BLD AUTO: 0 10E9/L (ref 0–0.7)
EOSINOPHIL NFR BLD AUTO: 0.2 %
ERYTHROCYTE [DISTWIDTH] IN BLOOD BY AUTOMATED COUNT: 14.2 % (ref 10–15)
FLUAV+FLUBV AG SPEC QL: NEGATIVE
FLUAV+FLUBV AG SPEC QL: POSITIVE
GFR SERPL CREATININE-BSD FRML MDRD: 31 ML/MIN/1.7M2
GLUCOSE SERPL-MCNC: 141 MG/DL (ref 70–99)
HCT VFR BLD AUTO: 34.3 % (ref 40–53)
HGB BLD-MCNC: 11.7 G/DL (ref 13.3–17.7)
IMM GRANULOCYTES # BLD: 0 10E9/L (ref 0–0.4)
IMM GRANULOCYTES NFR BLD: 0.2 %
LACTATE BLD-SCNC: 1.4 MMOL/L (ref 0.7–2)
LYMPHOCYTES # BLD AUTO: 0.9 10E9/L (ref 0.8–5.3)
LYMPHOCYTES NFR BLD AUTO: 16.1 %
MCH RBC QN AUTO: 28.4 PG (ref 26.5–33)
MCHC RBC AUTO-ENTMCNC: 34.1 G/DL (ref 31.5–36.5)
MCV RBC AUTO: 83 FL (ref 78–100)
MONOCYTES # BLD AUTO: 0.3 10E9/L (ref 0–1.3)
MONOCYTES NFR BLD AUTO: 5.3 %
NEUTROPHILS # BLD AUTO: 4.3 10E9/L (ref 1.6–8.3)
NEUTROPHILS NFR BLD AUTO: 78 %
NRBC # BLD AUTO: 0 10*3/UL
NRBC BLD AUTO-RTO: 0 /100
PLATELET # BLD AUTO: 227 10E9/L (ref 150–450)
POTASSIUM SERPL-SCNC: 3.7 MMOL/L (ref 3.4–5.3)
RBC # BLD AUTO: 4.12 10E12/L (ref 4.4–5.9)
SODIUM SERPL-SCNC: 135 MMOL/L (ref 133–144)
SPECIMEN SOURCE: ABNORMAL
WBC # BLD AUTO: 5.5 10E9/L (ref 4–11)

## 2018-02-18 PROCEDURE — 80048 BASIC METABOLIC PNL TOTAL CA: CPT | Performed by: EMERGENCY MEDICINE

## 2018-02-18 PROCEDURE — 96361 HYDRATE IV INFUSION ADD-ON: CPT

## 2018-02-18 PROCEDURE — 96360 HYDRATION IV INFUSION INIT: CPT

## 2018-02-18 PROCEDURE — 25000128 H RX IP 250 OP 636: Performed by: EMERGENCY MEDICINE

## 2018-02-18 PROCEDURE — 71046 X-RAY EXAM CHEST 2 VIEWS: CPT

## 2018-02-18 PROCEDURE — 85025 COMPLETE CBC W/AUTO DIFF WBC: CPT | Performed by: EMERGENCY MEDICINE

## 2018-02-18 PROCEDURE — 83605 ASSAY OF LACTIC ACID: CPT | Performed by: EMERGENCY MEDICINE

## 2018-02-18 PROCEDURE — 25000132 ZZH RX MED GY IP 250 OP 250 PS 637: Mod: GY | Performed by: EMERGENCY MEDICINE

## 2018-02-18 PROCEDURE — 87804 INFLUENZA ASSAY W/OPTIC: CPT | Performed by: EMERGENCY MEDICINE

## 2018-02-18 PROCEDURE — 99284 EMERGENCY DEPT VISIT MOD MDM: CPT | Mod: 25

## 2018-02-18 PROCEDURE — A9270 NON-COVERED ITEM OR SERVICE: HCPCS | Mod: GY | Performed by: EMERGENCY MEDICINE

## 2018-02-18 RX ORDER — IBUPROFEN 600 MG/1
600 TABLET, FILM COATED ORAL ONCE
Status: DISCONTINUED | OUTPATIENT
Start: 2018-02-18 | End: 2018-02-19 | Stop reason: HOSPADM

## 2018-02-18 RX ORDER — ACETAMINOPHEN 325 MG/1
975 TABLET ORAL ONCE
Status: COMPLETED | OUTPATIENT
Start: 2018-02-18 | End: 2018-02-18

## 2018-02-18 RX ORDER — OSELTAMIVIR PHOSPHATE 30 MG/1
30 CAPSULE ORAL 2 TIMES DAILY
Qty: 10 CAPSULE | Refills: 0 | Status: SHIPPED | OUTPATIENT
Start: 2018-02-18 | End: 2018-02-23

## 2018-02-18 RX ORDER — OSELTAMIVIR PHOSPHATE 75 MG/1
75 CAPSULE ORAL ONCE
Status: COMPLETED | OUTPATIENT
Start: 2018-02-18 | End: 2018-02-18

## 2018-02-18 RX ADMIN — ACETAMINOPHEN 975 MG: 325 TABLET, FILM COATED ORAL at 21:46

## 2018-02-18 RX ADMIN — OSELTAMIVIR PHOSPHATE 75 MG: 75 CAPSULE ORAL at 22:23

## 2018-02-18 RX ADMIN — SODIUM CHLORIDE 500 ML: 9 INJECTION, SOLUTION INTRAVENOUS at 21:49

## 2018-02-18 RX ADMIN — SODIUM CHLORIDE 500 ML: 9 INJECTION, SOLUTION INTRAVENOUS at 23:17

## 2018-02-18 ASSESSMENT — ENCOUNTER SYMPTOMS
NAUSEA: 0
MYALGIAS: 0
APPETITE CHANGE: 0
VOMITING: 0
ARTHRALGIAS: 0
FATIGUE: 1
DYSURIA: 0
COUGH: 1
FEVER: 1
ABDOMINAL PAIN: 0

## 2018-02-18 NOTE — ED AVS SNAPSHOT
Emergency Department    [address] [place] [address]    Phone:  [phone]    Fax:  [phone]                                       Michael Howard   MRN: [id number]    Department:   Emergency Department   Date of Visit:  2/18/2018           After Visit Summary Signature Page     I have received my discharge instructions, and my questions have been answered. I have discussed any challenges I see with this plan with the nurse or doctor.    ..........................................................................................................................................  Patient/Patient Representative Signature      ..........................................................................................................................................  Patient Representative Print Name and Relationship to Patient    ..................................................               ................................................  Date                                            Time    ..........................................................................................................................................  Reviewed by Signature/Title    ...................................................              ..............................................  Date                                                            Time

## 2018-02-18 NOTE — ED AVS SNAPSHOT
Emergency Department    [address] [facility] [address]    Phone:  [phone]    Fax:  [phone]                                       Michael Howard   MRN: [id number]    Department:   Emergency Department   Date of Visit:  2/18/2018           Patient Information     Date Of Birth          1940        Your diagnoses for this visit were:     Influenza A     CKD (chronic kidney disease) stage 3, GFR 30-59 ml/min        You were seen by William Valles MD.      Follow-up Information     Follow up with Eric Sutton MD In 1 week.    Specialty:  Family Practice    Contact information:    [facility]  [address] [address] [phone]          Follow up with  Emergency Department.    Specialty:  EMERGENCY MEDICINE    Why:  As needed, If symptoms worsen    Contact information:    [address] [facility] [address] [phone]        Discharge Instructions         Influenza (Adult)    Influenza is also called the flu. It is a viral illness that affects the air passages of your lungs. It is different from the common cold. The flu can easily be passed from one to person to another. It may be spread through the air by coughing and sneezing. Or it can be spread by touching the sick person and then touching your own eyes, nose, or mouth.  The flu starts 1 to 3 days after you are exposed to the flu virus. It may last for 1 to 2 weeks but many people feel tired or fatigued for many weeks afterward. You usually don t need to take antibiotics unless you have a complication. This might be an ear or sinus infection or pneumonia.  Symptoms of the flu may be mild or severe. They can include extreme tiredness (wanting to stay in bed all day), chills, fevers, muscle aches, soreness with eye movement, headache, and a dry, hacking cough.  Home care  Follow these guidelines when caring for yourself at home:    Avoid being around  cigarette smoke, whether yours or other people s.    Acetaminophen or ibuprofen will help ease your fever, muscle aches, and headache. Don t give aspirin to anyone younger than 18 who has the flu. Aspirin can harm the liver.    Nausea and loss of appetite are common with the flu. Eat light meals. Drink 6 to 8 glasses of liquids every day. Good choices are water, sport drinks, soft drinks without caffeine, juices, tea, and soup. Extra fluids will also help loosen secretions in your nose and lungs.    Over-the-counter cold medicines will not make the flu go away faster. But the medicines may help with coughing, sore throat, and congestion in your nose and sinuses. Don t use a decongestant if you have high blood pressure.    Stay home until your fever has been gone for at least 24 hours without using medicine to reduce fever.  Follow-up care  Follow up with your healthcare provider, or as advised, if you are not getting better over the next week.  If you are age 65 or older, talk with your provider about getting a pneumococcal vaccine every 5 years. You should also get this vaccine if you have chronic asthma or COPD. All adults should get a flu vaccine every fall. Ask your provider about this.  When to seek medical advice  Call your healthcare provider right away if any of these occur:    Cough with lots of colored mucus (sputum) or blood in your mucus    Chest pain, shortness of breath, wheezing, or trouble breathing    Severe headache, or face, neck, or ear pain    New rash with fever    Fever of 100.4 F (38 C) or higher, or as directed by your healthcare provider    Confusion, behavior change, or seizure    Severe weakness or dizziness    You get a new fever or cough after getting better for a few days  Date Last Reviewed: 1/1/2017 2000-2017 [employer]. [address] [place] [address], [place] [address]. All rights reserved. This information is not intended as a substitute for professional medical care.  Always follow your healthcare professional's instructions.          24 Hour Appointment Hotline       To make an appointment at any [facility], call [phone] ([phone]). If you don't have a family doctor or clinic, we will help you find one. [facility] clinics are conveniently located to serve the needs of you and your family.             Review of your medicines      START taking        Dose / Directions Last dose taken    oseltamivir 30 MG capsule   Commonly known as:  TAMIFLU   Dose:  30 mg   Quantity:  10 capsule        Take 1 capsule (30 mg) by mouth 2 times daily for 5 days   Refills:  0          Our records show that you are taking the medicines listed below. If these are incorrect, please call your family doctor or clinic.        Dose / Directions Last dose taken    acetaminophen 500 MG tablet   Commonly known as:  TYLENOL   Dose:  500 mg        Take 500 mg by mouth every 6 hours as needed for mild pain   Refills:  0        AMLODIPINE BESYLATE PO   Dose:  10 mg        Take 10 mg by mouth At Bedtime   Refills:  0        ASPIRIN PO   Dose:  81 mg        Take 81 mg by mouth daily   Refills:  0        CITALOPRAM HYDROBROMIDE PO   Dose:  20 mg        Take 20 mg by mouth daily.   Refills:  0        CRESTOR PO   Dose:  40 mg        Take 40 mg by mouth At Bedtime   Refills:  0        FENOFIBRATE PO   Dose:  134 mg        Take 134 mg by mouth daily   Refills:  0        HYDROCHLOROTHIAZIDE PO   Dose:  25 mg        Take 25 mg by mouth daily   Refills:  0        LISINOPRIL PO   Dose:  40 mg        Take 40 mg by mouth daily.   Refills:  0        METFORMIN HCL PO   Dose:  1000 mg        Take 1,000 mg by mouth 2 times daily (with meals).   Refills:  0        OMEPRAZOLE PO   Dose:  20 mg        Take 20 mg by mouth every morning   Refills:  0        RANITIDINE HCL PO   Dose:  300 mg        Take 300 mg by mouth At Bedtime.   Refills:  0        vardenafil 20 MG tablet   Commonly known as:  LEVITRA   Dose:  10 mg         Take 10 mg by mouth daily as needed   Refills:  0        Vitamin D (Cholecalciferol) 1000 UNITS Caps   Dose:  1 capsule        Take 1 capsule by mouth daily   Refills:  0                Prescriptions were sent or printed at these locations (1 Prescription)                   Other Prescriptions                Printed at Department/Unit printer (1 of 1)         oseltamivir (TAMIFLU) 30 MG capsule                Procedures and tests performed during your visit     Basic metabolic panel    CBC with platelets differential    Draw and hold blood cultures    Influenza A/B antigen    Lactic acid whole blood    Peripheral IV catheter    XR Chest 2 Views      Orders Needing Specimen Collection     None      Pending Results     No orders found from 2/16/2018 to 2/19/2018.            Pending Culture Results     No orders found from 2/16/2018 to 2/19/2018.            Pending Results Instructions     If you had any lab results that were not finalized at the time of your Discharge, you can call the ED Lab Result RN at [phone]. You will be contacted by this team for any positive Lab results or changes in treatment. The nurses are available 7 days a week from 10A to 6:30P.  You can leave a message 24 hours per day and they will return your call.        Test Results From Your Hospital Stay        2/18/2018  9:51 PM      Component Results     Component Value Ref Range & Units Status    Influenza A/B Agn Specimen Nasopharyngeal  Final    Influenza A Positive (A) NEG^Negative Final    Influenza B Negative NEG^Negative Final    Test results must be correlated with clinical data. If necessary, results   should be confirmed by a molecular assay or viral culture.           2/18/2018  9:58 PM      Component Results     Component Value Ref Range & Units Status    WBC 5.5 4.0 - 11.0 10e9/L Final    RBC Count 4.12 (L) 4.4 - 5.9 10e12/L Final    Hemoglobin 11.7 (L) 13.3 - 17.7 g/dL Final    Hematocrit 34.3 (L) 40.0 - 53.0 % Final     MCV 83 78 - 100 fl Final    MCH 28.4 26.5 - 33.0 pg Final    MCHC 34.1 31.5 - 36.5 g/dL Final    RDW 14.2 10.0 - 15.0 % Final    Platelet Count 227 150 - 450 10e9/L Final    Diff Method Automated Method  Final    % Neutrophils 78.0 % Final    % Lymphocytes 16.1 % Final    % Monocytes 5.3 % Final    % Eosinophils 0.2 % Final    % Basophils 0.2 % Final    % Immature Granulocytes 0.2 % Final    Nucleated RBCs 0 0 /100 Final    Absolute Neutrophil 4.3 1.6 - 8.3 10e9/L Final    Absolute Lymphocytes 0.9 0.8 - 5.3 10e9/L Final    Absolute Monocytes 0.3 0.0 - 1.3 10e9/L Final    Absolute Eosinophils 0.0 0.0 - 0.7 10e9/L Final    Absolute Basophils 0.0 0.0 - 0.2 10e9/L Final    Abs Immature Granulocytes 0.0 0 - 0.4 10e9/L Final    Absolute Nucleated RBC 0.0  Final         2/18/2018 10:17 PM      Component Results     Component Value Ref Range & Units Status    Sodium 135 133 - 144 mmol/L Final    Potassium 3.7 3.4 - 5.3 mmol/L Final    Chloride 101 94 - 109 mmol/L Final    Carbon Dioxide 25 20 - 32 mmol/L Final    Anion Gap 9 3 - 14 mmol/L Final    Glucose 141 (H) 70 - 99 mg/dL Final    Urea Nitrogen 28 7 - 30 mg/dL Final    Creatinine 2.07 (H) 0.66 - 1.25 mg/dL Final    GFR Estimate 31 (L) >60 mL/min/1.7m2 Final    Non  GFR Calc    GFR Estimate If Black 38 (L) >60 mL/min/1.7m2 Final    African American GFR Calc    Calcium 9.3 8.5 - 10.1 mg/dL Final         2/18/2018  9:59 PM      Component Results     Component Value Ref Range & Units Status    Lactic Acid 1.4 0.7 - 2.0 mmol/L Final         2/18/2018 10:02 PM      Narrative     XR CHEST 2 VW 2/18/2018 9:59 PM    HISTORY: Cough.    COMPARISON: None.        Impression     IMPRESSION: The lungs are clear. No focal pulmonary opacities. Heart  and mediastinum are unremarkable. No acute cardiopulmonary  abnormalities.    LESLYE RAMIREZ MD                Clinical Quality Measure: Blood Pressure Screening     Your blood pressure was checked while you were in the  "emergency department today. The last reading we obtained was  BP: 134/58 . Please read the guidelines below about what these numbers mean and what you should do about them.  If your systolic blood pressure (the top number) is less than 120 and your diastolic blood pressure (the bottom number) is less than 80, then your blood pressure is normal. There is nothing more that you need to do about it.  If your systolic blood pressure (the top number) is 120-139 or your diastolic blood pressure (the bottom number) is 80-89, your blood pressure may be higher than it should be. You should have your blood pressure rechecked within a year by a primary care provider.  If your systolic blood pressure (the top number) is 140 or greater or your diastolic blood pressure (the bottom number) is 90 or greater, you may have high blood pressure. High blood pressure is treatable, but if left untreated over time it can put you at risk for heart attack, stroke, or kidney failure. You should have your blood pressure rechecked by a primary care provider within the next 4 weeks.  If your provider in the emergency department today gave you specific instructions to follow-up with your doctor or provider even sooner than that, you should follow that instruction and not wait for up to 4 weeks for your follow-up visit.        Thank you for choosing [facility]       Thank you for choosing [facility] for your care. Our goal is always to provide you with excellent care. Hearing back from our patients is one way we can continue to improve our services. Please take a few minutes to complete the written survey that you may receive in the mail after you visit with us. Thank you!        [employer] Information     [employer] lets you send messages to your doctor, view your test results, renew your prescriptions, schedule appointments and more. To sign up, go to [email or web address] . Click on \"Log in\" on the left side of the screen, which will take you to the " "Welcome page. Then click on \"Sign up Now\" on the right side of the page.     You will be asked to enter the access code listed below, as well as some personal information. Please follow the directions to create your username and password.     Your access code is: [email or web address]  Expires: 4/15/2018 10:51 AM     Your access code will  in 90 days. If you need help or a new code, please call your [facility] clinic or [phone].        Care EveryWhere ID     This is your Care EveryWhere ID. This could be used by other organizations to access your [facility] medical records  [id number]        Equal Access to Services     KAMILLA TOWNSEND : Shahnaz Braxton, halima schwartz, delaney blake, monica christianson. So [facility] [phone].    ATENCIÓN: Si habla español, tiene a frost disposición servicios gratuitos de asistencia lingüística. Llame al [phone].    We comply with applicable federal civil rights laws and [place] laws. We do not discriminate on the basis of race, color, national origin, age, disability, sex, sexual orientation, or gender identity.            After Visit Summary       This is your record. Keep this with you and show to your community pharmacist(s) and doctor(s) at your next visit.                  "

## 2018-02-19 VITALS
RESPIRATION RATE: 20 BRPM | HEART RATE: 81 BPM | OXYGEN SATURATION: 97 % | BODY MASS INDEX: 24.71 KG/M2 | WEIGHT: 163 LBS | TEMPERATURE: 100.2 F | DIASTOLIC BLOOD PRESSURE: 58 MMHG | HEIGHT: 68 IN | SYSTOLIC BLOOD PRESSURE: 132 MMHG

## 2018-02-19 NOTE — DISCHARGE INSTRUCTIONS
Influenza (Adult)    Influenza is also called the flu. It is a viral illness that affects the air passages of your lungs. It is different from the common cold. The flu can easily be passed from one to person to another. It may be spread through the air by coughing and sneezing. Or it can be spread by touching the sick person and then touching your own eyes, nose, or mouth.  The flu starts 1 to 3 days after you are exposed to the flu virus. It may last for 1 to 2 weeks but many people feel tired or fatigued for many weeks afterward. You usually don t need to take antibiotics unless you have a complication. This might be an ear or sinus infection or pneumonia.  Symptoms of the flu may be mild or severe. They can include extreme tiredness (wanting to stay in bed all day), chills, fevers, muscle aches, soreness with eye movement, headache, and a dry, hacking cough.  Home care  Follow these guidelines when caring for yourself at home:    Avoid being around cigarette smoke, whether yours or other people s.    Acetaminophen or ibuprofen will help ease your fever, muscle aches, and headache. Don t give aspirin to anyone younger than 18 who has the flu. Aspirin can harm the liver.    Nausea and loss of appetite are common with the flu. Eat light meals. Drink 6 to 8 glasses of liquids every day. Good choices are water, sport drinks, soft drinks without caffeine, juices, tea, and soup. Extra fluids will also help loosen secretions in your nose and lungs.    Over-the-counter cold medicines will not make the flu go away faster. But the medicines may help with coughing, sore throat, and congestion in your nose and sinuses. Don t use a decongestant if you have high blood pressure.    Stay home until your fever has been gone for at least 24 hours without using medicine to reduce fever.  Follow-up care  Follow up with your healthcare provider, or as advised, if you are not getting better over the next week.  If you are age 65 or  older, talk with your provider about getting a pneumococcal vaccine every 5 years. You should also get this vaccine if you have chronic asthma or COPD. All adults should get a flu vaccine every fall. Ask your provider about this.  When to seek medical advice  Call your healthcare provider right away if any of these occur:    Cough with lots of colored mucus (sputum) or blood in your mucus    Chest pain, shortness of breath, wheezing, or trouble breathing    Severe headache, or face, neck, or ear pain    New rash with fever    Fever of 100.4 F (38 C) or higher, or as directed by your healthcare provider    Confusion, behavior change, or seizure    Severe weakness or dizziness    You get a new fever or cough after getting better for a few days  Date Last Reviewed: 1/1/2017 2000-2017 [employer]. [address] [place] [address], [place] [address]. All rights reserved. This information is not intended as a substitute for professional medical care. Always follow your healthcare professional's instructions.

## 2018-02-19 NOTE — ED PROVIDER NOTES
History     Chief Complaint:  Flu symptoms    HPI   Michael Howard is a 77 year old male who presents to the emergency department today for evaluation of flu symptoms and is accompanied by his friend.  The patient reports several days of generalized weakness, coughing, and this afternoon, had an episode of urinary incontinence and noted a fever of 101 .  Symptoms have been worsening over the course of this sickness, though he denies any myalgias, arthralgias, abdominal pain, dysuria, nausea, vomiting, sore throat, change in appetite, recent sick exposures, or recent changes in his medications.  He did receive a flu shot this season.    Allergies:  Ciprofloxacin    Medications:    Vardenafil  Aspirin  Omeprazole  Amlodipine  Fenofibrate  Hydrochlorothiazide  Rosuvastatin calcium  Ranitidine  Citalopram  Lisinopril  Metformin  Vitamin D    Past Medical History:    Adenomatous colon polyp   Arrhythmia   Wright esophagus   Cataract   Chronic kidney disease, stage III (moderate)   Diabetes mellitus (H)   Diarrhea   Diverticulitis   Dupuytren contracture   GERD (gastroesophageal reflux disease)   H/O prostatitis   Hyperlipidemia   Hypertension   Lesion of mouth   Major depression   Right inguinal hernia   Stones, bladder, diverticulum   Urethral stricture    Past Surgical History:    Appendectomy  Coronary ablation for SVT  Colonoscopy  Cystoscopy, TURP, combined  Nasal polyps, tonsillectomy  Extracorporeal shockwave lithotripsy  Retinal detachment repair  Left inguinal hernia repair  Phacoemulsification clear cornea with standard IOL, bilaterally  Vasectomy    Family History:    History reviewed. No pertinent family history.     Social History:  The patient was accompanied to the ED by a friend.  Smoking Status: Never smoker  Smokeless Tobacco: Never used  Alcohol Use: Yes  Marital Status:   [5]    Review of Systems   Constitutional: Positive for fatigue and fever. Negative for appetite change.   Respiratory:  "Positive for cough.    Gastrointestinal: Negative for abdominal pain, nausea and vomiting.   Genitourinary: Negative for dysuria.   Musculoskeletal: Negative for arthralgias and myalgias.   All other systems reviewed and are negative.    Physical Exam     Patient Vitals for the past 24 hrs:   BP Temp Temp src Pulse Heart Rate Resp SpO2 Height Weight   02/18/18 2230 134/58 - - - - - - - -   02/18/18 2227 132/57 - - - - - - - -   02/18/18 2134 142/62 100.2  F (37.9  C) Oral - - 18 - - -   02/18/18 2130 - - - - - - 96 % - -   02/18/18 2102 - 98.9  F (37.2  C) Oral 79 79 20 96 % 1.727 m (5' 8\") 73.9 kg (163 lb)     Physical Exam  General: Alert, interactive in mild distress  Head:  Scalp is atraumatic  Eyes:  The pupils are equal, round, and reactive to light    EOM's intact    No scleral icterus  ENT:      Nose:  The external nose is normal  Ears:  External ears are normal  Mouth/Throat: The oropharynx is normal    Mucus membranes are dry      Neck:  Normal range of motion.      There is no rigidity.    Trachea is in the midline         CV:  Regular rate and rhythm    No murmur   Resp:  Breath sounds are clear bilaterally    Non-labored, no retractions or accessory muscle use      GI:  Abdomen is soft, no distension, no tenderness.       MS:  Normal strength in all 4 extremities  Skin:  Skin is warm to the touch and dry, No rash or lesions noted.  Psych:  Awake. Alert.  Normal affect.      Appropriate interactions.    Emergency Department Course     Imaging:  Radiology findings were communicated with the patient who voiced understanding of the findings.    XR Chest 2 Views  The lungs are clear. No focal pulmonary opacities. Heart  and mediastinum are unremarkable. No acute cardiopulmonary abnormalities.  Reading per radiology    Laboratory:  Laboratory findings were communicated with the patient who voiced understanding of the findings.    Influenza A/B antigen: Positive for influenza A    CBC: WBC 5.5, HGB 11.7 (L), "    BMP: Glucose 141 (H), Creatinine 2.07 (H), eGFR 31 (L) o/w WNL  Lactic Acid (Collected at 2140): 1.4    Interventions:  2146 Tylenol 975 mg PO  2149 ns 500 mL IV  2223 Tamiflu 75 mg PO  2317 ns 500 mL IV    Emergency Department Course:  Nursing notes and vitals reviewed.  2130: I performed an exam of the patient as documented above.   IV was inserted and blood was drawn for laboratory testing, results above.  The patient was sent for a XR chest 2 views while in the emergency department, results above.   2152: I rechecked the patient and updated him on the results of laboratory studies.  The patient provided a urine sample here in the emergency department. This was sent for laboratory testing, findings above.  2311: I rechecked the patient and updated him on the results of further laboratory and imaging studies.   2358: I rechecked the patient and updated him on the results of further laboratory studies. I discussed the treatment plan with the patient. He expressed understanding of this plan and consented to discharge. He will be discharged home with instructions for care and follow up. In addition, the patient will return to the emergency department if their symptoms worsen, if new symptoms arise or if there is any concern.  All questions were answered prior to discharge.    Impression & Plan      Medical Decision Making:  Michael Howard is a 77 year old male who presents for evaluation of cough, fever and generalized weakness.  This is consistent with influenza.  The patient is within the treatment window for influenza, so Tamiflu was ordered as noted above.  They are at risk for pneumonia but chest x-ray was negative and no signs of this are detected on today's visit.  Close followup of primary care physician is indicated and return to the ED for high fevers > 103 for more than 48 hours more, increasing productive cough, shortness of breath, or confusion.  There is no signs of serious bacterial  infection such as bacteremia, meningitis, UTI/pyelonephritis, strep pharyngitis, etc.      Diagnosis:    ICD-10-CM    1. Influenza A J10.1    2. CKD (chronic kidney disease) stage 3, GFR 30-59 ml/min N18.3      Disposition:  Home    Discharge Medications:  New Prescriptions    OSELTAMIVIR (TAMIFLU) 30 MG CAPSULE    Take 1 capsule (30 mg) by mouth 2 times daily for 5 days       Scribe Disclosure:  I, Valeriy Michael, am serving as a scribe at 9:30 PM on 2/18/2018 to document services personally performed by William Valles MD, based on my observations and the provider's statements to me.    2/18/2018    EMERGENCY DEPARTMENT       William Valles MD  02/18/18 0234

## 2018-04-10 ENCOUNTER — OFFICE VISIT (OUTPATIENT)
Dept: PHARMACY | Facility: PHYSICIAN GROUP | Age: 78
End: 2018-04-10
Payer: COMMERCIAL

## 2018-04-10 DIAGNOSIS — K21.9 GASTROESOPHAGEAL REFLUX DISEASE, ESOPHAGITIS PRESENCE NOT SPECIFIED: ICD-10-CM

## 2018-04-10 DIAGNOSIS — I10 BENIGN ESSENTIAL HYPERTENSION: ICD-10-CM

## 2018-04-10 DIAGNOSIS — E78.5 HYPERLIPIDEMIA LDL GOAL <100: ICD-10-CM

## 2018-04-10 DIAGNOSIS — F41.9 ANXIETY: ICD-10-CM

## 2018-04-10 DIAGNOSIS — E63.9 NUTRITIONAL DEFICIENCY: ICD-10-CM

## 2018-04-10 DIAGNOSIS — E11.9 CONTROLLED TYPE 2 DIABETES MELLITUS WITHOUT COMPLICATION, WITHOUT LONG-TERM CURRENT USE OF INSULIN (H): Primary | ICD-10-CM

## 2018-04-10 PROCEDURE — 99606 MTMS BY PHARM EST 15 MIN: CPT | Performed by: PHARMACIST

## 2018-04-10 PROCEDURE — 99607 MTMS BY PHARM ADDL 15 MIN: CPT | Performed by: PHARMACIST

## 2018-04-10 NOTE — PROGRESS NOTES
SUBJECTIVE/OBJECTIVE:                Michael Howard is a 77 year old male called for a follow-up visit for Medication Therapy Management.  He was referred to me from Ally Neri [facility] pharmacist previously seeing patient.     Chief Complaint: Follow up from MTM visit on 1/15.  New to [address] [facility] pharmacist. PCP is Dr. Sutton.     Tobacco: No tobacco use  Alcohol: Less than 1 beverages / week    Medication Adherence/Access:  no issues reported    Diabetes:  Pt currently taking metformin 1000mg BID.  Pt is not experiencing side effects.  Last A1c was 6.1% on 11/27/17.  SMBG: one time daily (AM fasting).   Ranges (patient reported): mostly 100s, 90-119mg/dl range  Symptoms of low blood sugar? shaky, weak, sweaty. Frequency of hypoglycemia? None recently  Recent symptoms of high blood sugar? none  Microalbumin is not < 30 mg/g. Pt is taking an ACEi/ARB.  Aspirin: Taking 81mg daily and denies side effects.  Has DM follow up scheduled for 4/24/18.     Hypertension: Current medications include amlodipine 10mg daily, HCTZ 25mg daily, and lisinopril 40mg daily.  Patient does not self-monitor BP.  Patient reports the following medication side effects: none.     Hyperlipidemia: Current therapy includes Crestor 40mg daily and fenofibrate 134mg daily.  Pt reports no significant myalgias or other side effects.  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  Current medications include: omeprazole 20mg daily and ranitidine 300mg daily. Pt c/o no current symptoms.  Patient feels that current regimen is effective.  Crcl=31ml/min using Scr=2.07  GFR=31mL/min/1.7m2    Anxiety:  Current medications include: citalopram 20mg daily.  He feels this works well and he denies side effects of therapy.  He denies any trouble with his anxiety at this time. Lost his wife several years ago which has been difficult for him. Has a  long standing relationship with Dr. Sutton.    Supplements: Currently taking vitamin D 1000 daily. Not on calcium due to hx of kidney stones. Denies issues at this time.     Current labs include:  Today's Vitals: There were no vitals taken for this visit.  BP Readings from Last 3 Encounters:   02/19/18 132/58   01/15/18 126/60   08/02/17 112/54     Last Basic Metabolic Panel:  Lab Results   Component Value Date     02/18/2018      Lab Results   Component Value Date    POTASSIUM 3.7 02/18/2018     Lab Results   Component Value Date    CHLORIDE 101 02/18/2018     Lab Results   Component Value Date    BUN 28 02/18/2018     Lab Results   Component Value Date    CR 2.07 02/18/2018     GFR Estimate   Date Value Ref Range Status   02/18/2018 31 (L) >60 mL/min/1.7m2 Final     Comment:     Non  GFR Calc   07/28/2017 34 (L) >60 mL/min/1.7m2 Final     Comment:     Non  GFR Calc   02/03/2016 46 (L) >60 mL/min/1.7m2 Final     Comment:     Non  GFR Calc     GFR Estimate If Black   Date Value Ref Range Status   02/18/2018 38 (L) >60 mL/min/1.7m2 Final     Comment:      GFR Calc   07/28/2017 41 (L) >60 mL/min/1.7m2 Final     Comment:      GFR Calc   02/03/2016 55 (L) >60 mL/min/1.7m2 Final     Comment:      GFR Calc       Most Recent Immunizations   Administered Date(s) Administered     Influenza (High Dose) 3 valent vaccine 10/05/2016       ASSESSMENT:              Current medications were reviewed today as discussed above.      Medication Adherence: good, no issues identified    Diabetes:  Needs improvement, given low a1c and renal function, would consider reducing metformin to 500mg BID.     Hypertension: Stable. BP was at goal at last visit.     Hyperlipidemia: Needs improvement, patient would benefit from stopping the fenofibrate, likely not needed and increased risks of side effects given renal function.     GERD: Needs  improvement, dose max for renal function would be 150mg of ranitidine per day, suggest lowering dose to minimize risk of adverse effects.     Anxiety: Stable.    Supplements: Stable.     PLAN:                  1. Dr. Sutton to consider:  - lowering metformin to 500mg BID  - stopping fenofibrate  - lowering ranitidine to 150mg daily.       I spent 20 minutes with this patient today.  A copy of the visit note was provided to the patient's primary care provider.     Will follow up in 1 month or sooner if needed.    The patient declined a summary of these recommendations as an after visit summary.    Nimisha Armendariz, Pharm.D, [facility]  Medication Therapy Management Pharmacist  [phone]

## 2018-05-14 DIAGNOSIS — R10.9 FLANK PAIN: Primary | ICD-10-CM

## 2018-06-12 DIAGNOSIS — N20.0 KIDNEY STONE: Primary | ICD-10-CM

## 2018-06-13 ENCOUNTER — OFFICE VISIT (OUTPATIENT)
Dept: UROLOGY | Facility: CLINIC | Age: 78
End: 2018-06-13
Payer: COMMERCIAL

## 2018-06-13 ENCOUNTER — HOSPITAL ENCOUNTER (OUTPATIENT)
Dept: GENERAL RADIOLOGY | Facility: CLINIC | Age: 78
Discharge: HOME | End: 2018-06-13
Attending: UROLOGY | Admitting: UROLOGY
Payer: MEDICARE

## 2018-06-13 VITALS
OXYGEN SATURATION: 96 % | SYSTOLIC BLOOD PRESSURE: 118 MMHG | BODY MASS INDEX: 24.14 KG/M2 | HEART RATE: 50 BPM | HEIGHT: 69 IN | WEIGHT: 163 LBS | DIASTOLIC BLOOD PRESSURE: 58 MMHG

## 2018-06-13 DIAGNOSIS — N20.0 KIDNEY STONE: ICD-10-CM

## 2018-06-13 DIAGNOSIS — R10.9 FLANK PAIN: ICD-10-CM

## 2018-06-13 LAB
ALBUMIN UR-MCNC: 30 MG/DL
APPEARANCE UR: CLEAR
BILIRUB UR QL STRIP: NEGATIVE
COLOR UR AUTO: YELLOW
GLUCOSE UR STRIP-MCNC: NEGATIVE MG/DL
HGB UR QL STRIP: NEGATIVE
KETONES UR STRIP-MCNC: NEGATIVE MG/DL
LEUKOCYTE ESTERASE UR QL STRIP: NEGATIVE
NITRATE UR QL: NEGATIVE
PH UR STRIP: 5.5 PH (ref 5–7)
SOURCE: ABNORMAL
SP GR UR STRIP: 1.02 (ref 1–1.03)
UROBILINOGEN UR STRIP-ACNC: 0.2 EU/DL (ref 0.2–1)

## 2018-06-13 PROCEDURE — 99212 OFFICE O/P EST SF 10 MIN: CPT | Performed by: UROLOGY

## 2018-06-13 PROCEDURE — 81003 URINALYSIS AUTO W/O SCOPE: CPT | Performed by: UROLOGY

## 2018-06-13 PROCEDURE — 74019 RADEX ABDOMEN 2 VIEWS: CPT

## 2018-06-13 RX ORDER — INFLUENZA A VIRUS A/VICTORIA/4897/2022 IVR-238 (H1N1) ANTIGEN (FORMALDEHYDE INACTIVATED), INFLUENZA A VIRUS A/CALIFORNIA/122/2022 SAN-022 (H3N2) ANTIGEN (FORMALDEHYDE INACTIVATED), AND INFLUENZA B VIRUS B/MICHIGAN/01/2021 ANTIGEN (FORMALDEHYDE INACTIVATED) 60; 60; 60 UG/.5ML; UG/.5ML; UG/.5ML
INJECTION, SUSPENSION INTRAMUSCULAR
Refills: 0 | COMMUNITY
Start: 2017-10-06 | End: 2018-07-03

## 2018-06-13 RX ORDER — LANCETS
EACH MISCELLANEOUS
Refills: 2 | COMMUNITY
Start: 2018-01-05

## 2018-06-13 ASSESSMENT — PAIN SCALES - GENERAL: PAINLEVEL: NO PAIN (0)

## 2018-06-13 NOTE — LETTER
6/13/2018      RE: Michael Howard  [address] [place] [address]       Michael Howard is a 77-year-old male with a history of calcium oxalate monohydrate stones and BPH. He is voiding well, has no dysuria or hematuria. He has a normal urinalysis and no new stones on KUB. He does have small stones in the lower poles of both kidneys.  Other past medical history: Renal insufficiency with normal renal ultrasound in February of this year, benign colon polyps, arrhythmia, Wright esophagus, cataracts, diabetes, diverticulitis, Dupuytren contracture, GERD, hyperlipidemia, hypertension, depression, nonsmoker, TURP  Medications: Tylenol, amlodipine, low-dose aspirin, citalopram, fenofibrate, Fluzone, hydrochlorothiazide, lisinopril, metformin, omeprazole, ranitidine, Crestor, Levitra, vitamin D  Allergies: Cipro  Exam: Normal appearance, normal vital signs. Alert and oriented, normocephalic, normal respirations, neuro grossly intact. Normal sphincter tone, no rectal mass or impaction, benign feeling prostate, normal seminal vesicles  Assessment: Renal insufficiency, calcium oxalate monohydrate urolithiasis stable, BPH  Plan: See me for JOSIANE, urinalysis and KUB yearly      Giovanny Ross MD

## 2018-06-13 NOTE — NURSING NOTE
Chief Complaint   Patient presents with     Clinic Care Coordination - Follow-up     Pt here for follow-up with LOREE Jiménez CMA

## 2018-06-13 NOTE — MR AVS SNAPSHOT
"              After Visit Summary   6/13/2018    Michael Howard    MRN: [id number]           Patient Information     Date Of Birth          1940        Visit Information        Provider Department      6/13/2018 2:30 PM Giovanny Ross MD [facility]        Today's Diagnoses     Kidney stone           Follow-ups after your visit        Follow-up notes from your care team     Return in about 1 year (around 6/13/2019) for KUB.      Your next 10 appointments already scheduled     Jul 03, 2018  9:00 AM CDT   SHORT with Nimisha Armendariz [facility]   [facility] ([facility])    [address] [facility] [address]  [facility] [address] [phone]              Future tests that were ordered for you today     Open Future Orders        Priority Expected Expires Ordered    XR KUB [[id number]] Routine 6/13/2019 6/13/2019 6/13/2018            Who to contact     If you have questions or need follow up information about today's clinic visit or your schedule please contact [facility] UROLOGY [facility] directly at [phone].  Normal or non-critical lab and imaging results will be communicated to you by MyChart, letter or phone within 4 business days after the clinic has received the results. If you do not hear from us within 7 days, please contact the clinic through [employer] or phone. If you have a critical or abnormal lab result, we will notify you by phone as soon as possible.  Submit refill requests through [employer] or call your pharmacy and they will forward the refill request to us. Please allow 3 business days for your refill to be completed.          Additional Information About Your Visit        MyChart Information     [employer] lets you send messages to your doctor, view your test results, renew your prescriptions, schedule appointments and more. To sign up, go to [email or web address] . Click on \"Log in\" on the left " "side of the screen, which will take you to the Welcome page. Then click on \"Sign up Now\" on the right side of the page.     You will be asked to enter the access code listed below, as well as some personal information. Please follow the directions to create your username and password.     Your access code is: [email or web address]  Expires: 2018  3:10 PM     Your access code will  in 90 days. If you need help or a new code, please call your [facility] clinic or [phone].        Care EveryWhere ID     This is your Care EveryWhere ID. This could be used by other organizations to access your [facility] medical records  [id number]        Your Vitals Were     Pulse Height Pulse Oximetry BMI (Body Mass Index)          50 1.74 m (5' 8.5\") 96% 24.42 kg/m2         Blood Pressure from Last 3 Encounters:   18 118/58   18 132/58   01/15/18 126/60    Weight from Last 3 Encounters:   18 73.9 kg (163 lb)   18 73.9 kg (163 lb)   01/15/18 77.9 kg (171 lb 12.8 oz)              We Performed the Following     UA without Microscopic        Primary Care Provider Office Phone # Fax #    Eric Sutton [phone]       [facility] [address] [place] [address]  [facility] [address]        Equal Access to Services     [facility] campusRAJESH : Hadii em Braxton, waaxda lana, qaybta kaalmalashawn blake, monica christianson. So [facility] [phone].    ATENCIÓN: Si habla español, tiene a frost disposición servicios gratuitos de asistencia lingüística. Llame al [phone].    We comply with applicable federal civil rights laws and [place] laws. We do not discriminate on the basis of race, color, national origin, age, disability, sex, sexual orientation, or gender identity.            Thank you!     Thank you for choosing [facility]OLOG[facility] CL[facility] [place]  for your care. Our goal is always to provide you with excellent care. Hearing back from our " patients is one way we can continue to improve our services. Please take a few minutes to complete the written survey that you may receive in the mail after your visit with us. Thank you!             Your Updated Medication List - Protect others around you: Learn how to safely use, store and throw away your medicines at www.disposemymeds.org.          This list is accurate as of 6/13/18  3:10 PM.  Always use your most recent med list.                   Brand Name Dispense Instructions for use Diagnosis    acetaminophen 500 MG tablet    TYLENOL     Take 500 mg by mouth every 6 hours as needed for mild pain        AMLODIPINE BESYLATE PO      Take 10 mg by mouth At Bedtime        ASPIRIN PO      Take 81 mg by mouth daily        blood glucose monitoring lancets      TEST ONCE D        CITALOPRAM HYDROBROMIDE PO      Take 20 mg by mouth daily.        CRESTOR PO      Take 40 mg by mouth At Bedtime        FENOFIBRATE PO      Take 134 mg by mouth daily        FLUZONE HIGH-DOSE 0.5 ML injection   Generic drug:  influenza Vac Split High-Dose      ADM 0.5ML IM UTD        HYDROCHLOROTHIAZIDE PO      Take 25 mg by mouth daily        LISINOPRIL PO      Take 40 mg by mouth daily.        METFORMIN HCL PO      Take 1,000 mg by mouth 2 times daily (with meals).        OMEPRAZOLE PO      Take 20 mg by mouth every morning        ONETOUCH ULTRA test strip   Generic drug:  blood glucose monitoring      daily        RANITIDINE HCL PO      Take 300 mg by mouth At Bedtime.        SHINGRIX injection   Generic drug:  zoster vaccine recombinant adjuvanted      U UTD        vardenafil 20 MG tablet    LEVITRA     Take 10 mg by mouth daily as needed        Vitamin D (Cholecalciferol) 1000 units Caps      Take 1 capsule by mouth daily

## 2018-06-13 NOTE — PROGRESS NOTES
Michael Howard is a 77-year-old male with a history of calcium oxalate monohydrate stones and BPH. He is voiding well, has no dysuria or hematuria. He has a normal urinalysis and no new stones on KUB. He does have small stones in the lower poles of both kidneys.  Other past medical history: Renal insufficiency with normal renal ultrasound in February of this year, benign colon polyps, arrhythmia, Wright esophagus, cataracts, diabetes, diverticulitis, Dupuytren contracture, GERD, hyperlipidemia, hypertension, depression, nonsmoker, TURP  Medications: Tylenol, amlodipine, low-dose aspirin, citalopram, fenofibrate, Fluzone, hydrochlorothiazide, lisinopril, metformin, omeprazole, ranitidine, Crestor, Levitra, vitamin D  Allergies: Cipro  Exam: Normal appearance, normal vital signs. Alert and oriented, normocephalic, normal respirations, neuro grossly intact. Normal sphincter tone, no rectal mass or impaction, benign feeling prostate, normal seminal vesicles  Assessment: Renal insufficiency, calcium oxalate monohydrate urolithiasis stable, BPH  Plan: See me for JOSIANE, urinalysis and KUB yearly

## 2018-07-03 ENCOUNTER — OFFICE VISIT (OUTPATIENT)
Dept: PHARMACY | Facility: PHYSICIAN GROUP | Age: 78
End: 2018-07-03
Payer: COMMERCIAL

## 2018-07-03 VITALS — BODY MASS INDEX: 24.57 KG/M2 | SYSTOLIC BLOOD PRESSURE: 124 MMHG | WEIGHT: 164 LBS | DIASTOLIC BLOOD PRESSURE: 64 MMHG

## 2018-07-03 DIAGNOSIS — E63.9 NUTRITIONAL DEFICIENCY: ICD-10-CM

## 2018-07-03 DIAGNOSIS — I10 BENIGN ESSENTIAL HYPERTENSION: ICD-10-CM

## 2018-07-03 DIAGNOSIS — F41.9 ANXIETY: ICD-10-CM

## 2018-07-03 DIAGNOSIS — E78.5 HYPERLIPIDEMIA LDL GOAL <100: ICD-10-CM

## 2018-07-03 DIAGNOSIS — E11.9 CONTROLLED TYPE 2 DIABETES MELLITUS WITHOUT COMPLICATION, WITHOUT LONG-TERM CURRENT USE OF INSULIN (H): Primary | ICD-10-CM

## 2018-07-03 DIAGNOSIS — K21.9 GASTROESOPHAGEAL REFLUX DISEASE, ESOPHAGITIS PRESENCE NOT SPECIFIED: ICD-10-CM

## 2018-07-03 PROCEDURE — 99607 MTMS BY PHARM ADDL 15 MIN: CPT | Performed by: PHARMACIST

## 2018-07-03 PROCEDURE — 99605 MTMS BY PHARM NP 15 MIN: CPT | Performed by: PHARMACIST

## 2018-07-03 RX ORDER — FERROUS SULFATE 325(65) MG
325 TABLET ORAL
COMMUNITY
End: 2021-11-02

## 2018-07-03 NOTE — PATIENT INSTRUCTIONS
Recommendations from today's MTM visit:                                                      1. Ask Dr. Zavala if you could try ferrous gluconate instead of ferrous sulfate for your iron supplement.     2. Decrease ranitidine to 1/2 tablet to 150mg once daily.     Next MTM visit: 3 months or sooner if needed.     To schedule another MTM appointment, please call the clinic directly or you may call the MTM scheduling line at [phone] or toll-free at [phone].     My Clinical Pharmacist's contact information:                                                      It was a pleasure seeing you today!  Please feel free to contact me with any questions or concerns you have.      Nimisha Armendariz, Pharm.D, [facility]  Medication Therapy Management Pharmacist  [phone]    You may receive a survey about the MTM services you received.  I would appreciate your feedback to help me serve you better in the future. Please fill it out and return it when you can. Your comments will be anonymous.

## 2018-07-03 NOTE — PROGRESS NOTES
SUBJECTIVE/OBJECTIVE:                Michael Howard is a 77 year old male coming in for a follow-up visit for Medication Therapy Management.  He was referred to me from .     Chief Complaint: Follow up from our visit on 4/10.  Saw PCP prior to MTM today for his CPX.    Tobacco: No tobacco use  Alcohol: Less than 1 beverages / week    Medication Adherence/Access:  no issues reported    Diabetes:  Pt currently taking metformin 500mg BID.  Pt is not experiencing side effects.  Last A1c was 6.2% on 4/24/18.  SMBG: one time daily (AM fasting).   Ranges (patient reported): 109-126mg/dl  Symptoms of low blood sugar? shaky, weak, sweaty. Frequency of hypoglycemia? None recently  Recent symptoms of high blood sugar? none  Microalbumin is not < 30 mg/g. Pt is taking an ACEi/ARB.  Aspirin: Taking 81mg daily and denies side effects.    Hypertension: Current medications include amlodipine 10mg daily, HCTZ 25mg daily, and lisinopril 40mg daily.  Patient does not self-monitor BP.  Patient reports the following medication side effects: none.     Hyperlipidemia: Current therapy includes Crestor 40mg daily. No longer on fenofibrate as of last visit due to not needed and renal fxn.  Pt reports no significant myalgias or other side effects.  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  Current medications include: omeprazole 20mg daily and ranitidine 300mg daily. Pt c/o no current symptoms.  Patient feels that current regimen is effective.  Crcl=35ml/min using Scr=1.93 on 4/27/18  GFR=33mL/min/1.7m2    Anxiety: Current medications include: citalopram 20mg daily.  He feels this works well and he denies side effects of therapy.  He denies any trouble with his anxiety at this time. Lost his wife several years ago which has been difficult for him. Has a long standing relationship with   Herman.    Supplements: Currently taking vitamin D 1000 daily and now on iron 325mg daily from his nephrologist. Not on calcium due to hx of kidney stones. Denies issues at this time.       ASSESSMENT:              Current medications were reviewed today as discussed above.      Medication Adherence: good, no issues identified    Diabetes: Stable. Pt is meeting goal of A1c<7%.     Hypertension: Stable. Pt is meeting /90mmHg.     Hyperlipidemia: Stable.     GERD: Suggest decrease in ranitidine to 150mg daily.     Anxiety: Stable.    Supplements: Likely going to have difficulty with iron absorption due to antacids, but discussed he may tolerate a lower elemental iron amount better and could discuss with Dr. Zavala if that would be appropriate or if that would be too low of a iron dose.     PLAN:                  1. Discussed changes with PCP- Decreasing ranitidine to 150mg daily.     2. Pt to discuss iron concerns with Dr. Zavala- may be able to get by with ferrous gluconate, which is a lower elemental iron amount overall, but may tolerate this better allowing for ongoing compliance.    I spent 20 minutes with this patient today. All changes were made via verbal approval with Eric Sutton. A copy of the visit note was provided to the patient's primary care provider.     Will follow up in 3 months.    The patient was given a summary of these recommendations as an after visit summary.    Nimisha Armendariz, Pharm.D, [facility]  Medication Therapy Management Pharmacist  [phone]

## 2018-09-14 ENCOUNTER — HOSPITAL ENCOUNTER (EMERGENCY)
Facility: CLINIC | Age: 78
Discharge: HOME | End: 2018-09-14
Attending: EMERGENCY MEDICINE | Admitting: EMERGENCY MEDICINE
Payer: MEDICARE

## 2018-09-14 VITALS
RESPIRATION RATE: 16 BRPM | OXYGEN SATURATION: 99 % | TEMPERATURE: 98 F | SYSTOLIC BLOOD PRESSURE: 124 MMHG | BODY MASS INDEX: 24.71 KG/M2 | HEART RATE: 81 BPM | HEIGHT: 68 IN | WEIGHT: 163 LBS | DIASTOLIC BLOOD PRESSURE: 46 MMHG

## 2018-09-14 DIAGNOSIS — N39.0 URINARY TRACT INFECTION WITHOUT HEMATURIA, SITE UNSPECIFIED: ICD-10-CM

## 2018-09-14 LAB
ALBUMIN UR-MCNC: 100 MG/DL
ANION GAP SERPL CALCULATED.3IONS-SCNC: 5 MMOL/L (ref 3–14)
APPEARANCE UR: ABNORMAL
BASOPHILS # BLD AUTO: 0 10E9/L (ref 0–0.2)
BASOPHILS NFR BLD AUTO: 0.2 %
BILIRUB UR QL STRIP: NEGATIVE
BUN SERPL-MCNC: 27 MG/DL (ref 7–30)
CALCIUM SERPL-MCNC: 9 MG/DL (ref 8.5–10.1)
CHLORIDE SERPL-SCNC: 100 MMOL/L (ref 94–109)
CO2 SERPL-SCNC: 28 MMOL/L (ref 20–32)
COLOR UR AUTO: YELLOW
CREAT SERPL-MCNC: 1.96 MG/DL (ref 0.66–1.25)
DIFFERENTIAL METHOD BLD: ABNORMAL
EOSINOPHIL # BLD AUTO: 0 10E9/L (ref 0–0.7)
EOSINOPHIL NFR BLD AUTO: 0 %
ERYTHROCYTE [DISTWIDTH] IN BLOOD BY AUTOMATED COUNT: 14.9 % (ref 10–15)
GFR SERPL CREATININE-BSD FRML MDRD: 33 ML/MIN/1.7M2
GLUCOSE SERPL-MCNC: 265 MG/DL (ref 70–99)
GLUCOSE UR STRIP-MCNC: NEGATIVE MG/DL
GRAN CASTS #/AREA URNS LPF: 19 /LPF
HCT VFR BLD AUTO: 34.7 % (ref 40–53)
HGB BLD-MCNC: 11.9 G/DL (ref 13.3–17.7)
HGB UR QL STRIP: ABNORMAL
HYALINE CASTS #/AREA URNS LPF: 13 /LPF (ref 0–2)
IMM GRANULOCYTES # BLD: 0 10E9/L (ref 0–0.4)
IMM GRANULOCYTES NFR BLD: 0.3 %
INTERPRETATION ECG - MUSE: NORMAL
KETONES UR STRIP-MCNC: NEGATIVE MG/DL
LEUKOCYTE ESTERASE UR QL STRIP: ABNORMAL
LYMPHOCYTES # BLD AUTO: 0.7 10E9/L (ref 0.8–5.3)
LYMPHOCYTES NFR BLD AUTO: 5 %
MCH RBC QN AUTO: 29 PG (ref 26.5–33)
MCHC RBC AUTO-ENTMCNC: 34.3 G/DL (ref 31.5–36.5)
MCV RBC AUTO: 85 FL (ref 78–100)
MONOCYTES # BLD AUTO: 1.2 10E9/L (ref 0–1.3)
MONOCYTES NFR BLD AUTO: 8.7 %
MUCOUS THREADS #/AREA URNS LPF: PRESENT /LPF
NEUTROPHILS # BLD AUTO: 11.4 10E9/L (ref 1.6–8.3)
NEUTROPHILS NFR BLD AUTO: 85.8 %
NITRATE UR QL: NEGATIVE
NRBC # BLD AUTO: 0 10*3/UL
NRBC BLD AUTO-RTO: 0 /100
PH UR STRIP: 5.5 PH (ref 5–7)
PLATELET # BLD AUTO: 208 10E9/L (ref 150–450)
POTASSIUM SERPL-SCNC: 3.9 MMOL/L (ref 3.4–5.3)
RBC # BLD AUTO: 4.1 10E12/L (ref 4.4–5.9)
RBC #/AREA URNS AUTO: 5 /HPF (ref 0–2)
SODIUM SERPL-SCNC: 133 MMOL/L (ref 133–144)
SOURCE: ABNORMAL
SP GR UR STRIP: 1.01 (ref 1–1.03)
SQUAMOUS #/AREA URNS AUTO: 2 /HPF (ref 0–1)
UROBILINOGEN UR STRIP-MCNC: NORMAL MG/DL (ref 0–2)
WBC # BLD AUTO: 13.3 10E9/L (ref 4–11)
WBC #/AREA URNS AUTO: 100 /HPF (ref 0–5)
WBC CLUMPS #/AREA URNS HPF: PRESENT /HPF

## 2018-09-14 PROCEDURE — 99284 EMERGENCY DEPT VISIT MOD MDM: CPT | Mod: 25

## 2018-09-14 PROCEDURE — 87088 URINE BACTERIA CULTURE: CPT | Performed by: EMERGENCY MEDICINE

## 2018-09-14 PROCEDURE — 87086 URINE CULTURE/COLONY COUNT: CPT | Performed by: EMERGENCY MEDICINE

## 2018-09-14 PROCEDURE — A9270 NON-COVERED ITEM OR SERVICE: HCPCS | Mod: GY | Performed by: EMERGENCY MEDICINE

## 2018-09-14 PROCEDURE — 85025 COMPLETE CBC W/AUTO DIFF WBC: CPT | Performed by: EMERGENCY MEDICINE

## 2018-09-14 PROCEDURE — 81001 URINALYSIS AUTO W/SCOPE: CPT | Performed by: EMERGENCY MEDICINE

## 2018-09-14 PROCEDURE — 25000128 H RX IP 250 OP 636: Performed by: EMERGENCY MEDICINE

## 2018-09-14 PROCEDURE — 80048 BASIC METABOLIC PNL TOTAL CA: CPT | Performed by: EMERGENCY MEDICINE

## 2018-09-14 PROCEDURE — 96361 HYDRATE IV INFUSION ADD-ON: CPT

## 2018-09-14 PROCEDURE — 25000132 ZZH RX MED GY IP 250 OP 250 PS 637: Mod: GY | Performed by: EMERGENCY MEDICINE

## 2018-09-14 PROCEDURE — 87186 SC STD MICRODIL/AGAR DIL: CPT | Performed by: EMERGENCY MEDICINE

## 2018-09-14 PROCEDURE — 93005 ELECTROCARDIOGRAM TRACING: CPT

## 2018-09-14 PROCEDURE — 96366 THER/PROPH/DIAG IV INF ADDON: CPT

## 2018-09-14 PROCEDURE — 96365 THER/PROPH/DIAG IV INF INIT: CPT

## 2018-09-14 RX ORDER — ACETAMINOPHEN 500 MG
500 TABLET ORAL ONCE
Status: COMPLETED | OUTPATIENT
Start: 2018-09-14 | End: 2018-09-14

## 2018-09-14 RX ORDER — CEFTRIAXONE 1 G/1
1 INJECTION, POWDER, FOR SOLUTION INTRAMUSCULAR; INTRAVENOUS ONCE
Status: COMPLETED | OUTPATIENT
Start: 2018-09-14 | End: 2018-09-14

## 2018-09-14 RX ADMIN — SODIUM CHLORIDE 1000 ML: 9 INJECTION, SOLUTION INTRAVENOUS at 16:47

## 2018-09-14 RX ADMIN — CEFTRIAXONE SODIUM 1 G: 1 INJECTION, POWDER, FOR SOLUTION INTRAMUSCULAR; INTRAVENOUS at 19:03

## 2018-09-14 RX ADMIN — ACETAMINOPHEN 500 MG: 500 TABLET, FILM COATED ORAL at 16:41

## 2018-09-14 ASSESSMENT — ENCOUNTER SYMPTOMS
WEAKNESS: 1
FREQUENCY: 0
COUGH: 0
CHILLS: 1
DYSURIA: 0
SHORTNESS OF BREATH: 0
FEVER: 1
HEMATURIA: 0
ABDOMINAL PAIN: 0
DIARRHEA: 0
DIFFICULTY URINATING: 0
CONFUSION: 1

## 2018-09-14 NOTE — ED PROVIDER NOTES
History     Chief Complaint:  Generalized weakness    HPI   Michael Howard is a 77 year old male with a history of CKD, diabetes, and hypertension who presents with generalized weakness. The patient states that starting two nights ago he started feeling weak, chills, somewhat confused, and was unsteady on his feet. These symptoms have continued and upon arrival to the ED he had a fever of 100.6. He denies any shortness of breath, cough, abdominal pain, diarrhea, or urinary problems. The patient notes that he experienced similar symptoms in February of this year and was diagnosed with Influenza A (see note below). He also notes that he has had his daily dose of metformin and ranitidine decreased recently.     Allergies:  Ciprofloxacin     Medications:    Vardenafil  Aspirin  Omeprazole  Amlodipine  Fenofibrate  Hydrochlorothiazide  Rosuvastatin calcium  Ranitidine  Citalopram  Lisinopril  Metformin  Vitamin D     Past Medical History:    Adenomatous colon polyp                  Arrhythmia                   Wright esophagus                  Cataract                      Chronic kidney disease, stage III (moderate)             Diabetes mellitus (H)              Diarrhea                      Diverticulitis                 Dupuytren contracture                        GERD (gastroesophageal reflux disease)                  H/O prostatitis             Hyperlipidemia                        Hypertension               Lesion of mouth                      Major depression                    Right inguinal hernia               Stones, bladder, diverticulum             Urethral stricture     Past Surgical History:    Appendectomy  Coronary ablation for SVT  Colonoscopy  Cystoscopy, TURP, combined  Nasal polyps, tonsillectomy  Extracorporeal shockwave lithotripsy  Retinal detachment repair  Left inguinal hernia repair  Phacoemulsification clear cornea with standard IOL, bilaterally  Vasectomy     Family History:    History  "reviewed. No pertinent family history.     Social History:  Patient presents alone.   Negative for tobacco use.  Rare alcohol use  Marital Status:        Review of Systems   Constitutional: Positive for chills and fever.   Respiratory: Negative for cough and shortness of breath.    Gastrointestinal: Negative for abdominal pain and diarrhea.   Genitourinary: Negative for difficulty urinating, dysuria, frequency and hematuria.   Musculoskeletal: Positive for gait problem.   Neurological: Positive for weakness.   Psychiatric/Behavioral: Positive for confusion.   All other systems reviewed and are negative.      Physical Exam   First Vitals:  BP: 124/46  Pulse: 81  Heart Rate: 81  Temp: 100.6  F (38.1  C)  Resp: 18  Height: 172.7 cm (5' 8\")  Weight: 73.9 kg (163 lb)  SpO2: 96 %      Physical Exam  General/Appearance: appears stated age, well-groomed, appears comfortable but fatigued  Eyes: EOMI, no scleral injection, no icterus  ENT: MMM  Neck: supple, nl ROM, no stiffness  Cardiovascular: RRR, nl S1S2, no m/r/g, 2+ pulses in all 4 extremities, cap refill <2sec  Respiratory: CTAB, good air movement throughout, no wheezes/rhonchi/rales, no increased WOB, no retractions  Back: no lesions, no CVA ttp  GI: abd soft, non-distended, nttp,  no HSM, no rebound, no guarding, nl BS  MSK: MCHUGH, good tone, no bony abnormality  Skin: warm and well-perfused, no rash, no edema, no ecchymosis, nl turgor  Neuro: GCS 15, alert and oriented, no gross focal neuro deficits  Psych: interacts appropriately  Heme: no petechia, no purpura, no active bleeding        Emergency Department Course   ECG:  Time: 1716  Vent. Rate 59 bpm. WV interval 176. QRS duration 94. QT/QTc 438/433. P-R-T axis 57 12 22. Sinus bradycardia. Otherwise normal ECG. No change compared to EKG dated 11/2/16. Read time: 1722    Laboratory:  CBC: WBC: 13.3 (H), HGB: 11.9 (L), PLT: 208  BMP: Glucose 265 (H), Creatinine: 1.96 (H), GFR: 33 (L) o/w WNL  UA with micro: " Blood: trace, protein albumin: 100, Leukocyte esterase: large, WBC: 100 (H), RBC: 5 (H), WBC clumps: present, Squamous epithelial: 2 (H), Mucous: present, Hyaline casts: 13 (H), Granular casts: 19 o/w negative  Urine culture: pending    Interventions:  1641 - NS 1L IV  1641 - Tylenol 500 mg PO  1904 - Rocephin 1 g IV    Emergency Department Course:  Nursing notes and vitals reviewed.  1617: I performed an exam of the patient as documented above.     1846: I rechecked the patient. He reports feeling better and he was able to ambulate to the bathroom.    2030: I rechecked the patient. Findings and plan explained to the Patient. Patient discharged home with instructions regarding supportive care, medications, and reasons to return. The importance of close follow-up was reviewed.       Impression & Plan      Medical Decision Making:  This patient has symptoms of weakness, fever.  Urinalysis confirms an infection.  There has been no fever, back/flank pain or significant abdominal pain.   There is no clinical evidence of pyelonephritis, appendicitis,  or diverticulitis.  The patient will be started on antibiotics for the infection after receiving IV abx here. Cx is pending. The pt feels well after IVF and feels outpt management is appropriate for him. Return if increasing pain, vomiting, fever, or inability to tolerate the oral antibiotic.  Follow up with primary physician is indicated if not improving in 2-3 days.        Diagnosis:    ICD-10-CM    1. Urinary tract infection without hematuria, site unspecified N39.0        Disposition:  discharged to home    Discharge Medications:  Discharge Medication List as of 9/14/2018  8:50 PM      START taking these medications    Details   amoxicillin-clavulanate (AUGMENTIN) 875-125 MG per tablet Take 1 tablet by mouth 2 times daily for 10 days, Disp-20 tablet, R-0, E-Prescribe           Neville MOREAU, am serving as a scribe on 9/14/2018 at 4:17 PM to personally document services  performed by Cassidy Jurado MD based on my observations and the provider's statements to me.      Neville Lang  9/14/2018    EMERGENCY DEPARTMENT       Cassidy Jurado MD  09/14/18 [id number]

## 2018-09-14 NOTE — ED AVS SNAPSHOT
Emergency Department    [address] [place] [address]    Phone:  [phone]    Fax:  [phone]                                       Michael Howard   MRN: [id number]    Department:   Emergency Department   Date of Visit:  9/14/2018           After Visit Summary Signature Page     I have received my discharge instructions, and my questions have been answered. I have discussed any challenges I see with this plan with the nurse or doctor.    ..........................................................................................................................................  Patient/Patient Representative Signature      ..........................................................................................................................................  Patient Representative Print Name and Relationship to Patient    ..................................................               ................................................  Date                                   Time    ..........................................................................................................................................  Reviewed by Signature/Title    ...................................................              ..............................................  Date                                               Time          22EPIC Rev 08/18

## 2018-09-14 NOTE — ED AVS SNAPSHOT
Emergency Department    [address] [facility] [address]    Phone:  [phone]    Fax:  [phone]                                       Michael Howard   MRN: [id number]    Department:   Emergency Department   Date of Visit:  9/14/2018           Patient Information     Date Of Birth          1940        Your diagnoses for this visit were:     Urinary tract infection without hematuria, site unspecified        You were seen by Cassidy Jurado MD.      Follow-up Information     Follow up with Eric Sutton MD.    Specialty:  Family Practice    Why:  As needed, If symptoms worsen    Contact information:    [facility]  [address] [address] [phone]          Follow up with  Emergency Department.    Specialty:  EMERGENCY MEDICINE    Why:  As needed, If symptoms worsen    Contact information:    [address] [facility] [address] [phone]        Discharge Instructions       YOU ARE BEING STARTED ON AN ANTIBIOTIC, A FLUOROQUINOLONE, THAT HAS THAT POTENTIAL TO CAUSE TENDON INFLAMMATION AND, IN THE MOST SEVERE CASES, TENDON RUPTURE. BECAUSE OF THIS DO NOT DO STRENUOUS ACTIVITY FOR THE NEXT SEVERAL WEEKS. ALSO, IF YOU DEVELOP PAIN OR TENDERNESS OVER YOUR TENDONS, SUCH AS YOUR ACHILLES TENDON, STOP THIS MEDICATION IMMEDIATELY AND GET SEEN BY A PHYSICIAN.    Discharge Instructions  Urinary Tract Infection  You have urinary tract infection, or UTI. The urinary tract includes the kidneys (which make urine), ureters (the tubes that carry urine from the kidneys to the bladder), the bladder (which stores urine), and urethra (the tube that carries urine out of the bladder).  Urinary tract infections occur when bacteria travel up the urethra into the bladder. We suspect a UTI based on chemical and microscopic findings in your urine, but if there is a question about your findings, we will do a culture to see if bacteria  grow. A urine culture takes several days. You should always follow-up with your primary physician to find out about results of your culture if one was done.   Return to the Emergency Department if:    You have severe back pain.    You are vomiting so that you can t take your medicine, or have signs of dehydration (such as urinating less than 3 times per day).    You have fever over 101.5 degrees F.    You have significant confusion or are very weak, or feel very ill.    Your child seems much more ill, won t wake up, won t respond right, or is crying for a long time and won t calm down.    Your child is showing signs of dehydration, Signs of dehydration can be:  o Your infant has had no wet diapers in 4-5 hours.  o Your older child has not passed urine in 6-8 hours.  o Your infant or child starts to have dry mouth and lips, or no saliva or tears.    Follow-up with your doctor:     Children under 24 months need to be seen by their regular doctor within one week after a diagnosis of a UTI. It may be necessary to do some imaging tests to look at the child s kidney or bladder.    You should begin to feel better within 24 - 48 hours of starting your antibiotic.  If you do not, you need to be seen again.      Treatment:     You will be treated with an antibiotic to kill the bacteria. We have to make an educated guess as to which antibiotic will work for your infection. In most healthy people, we can guess right almost all of the time. Sometimes a culture is done to show which antibiotics will work. This usually takes 2-3 days. When the culture is done, we may have to contact you to put you on a different antibiotic.    Take a pain medication such as Tylenol  (acetaminophen), Advil  (ibuprofen), Nuprin  (ibuprofen), or Aleve  (naproxen). If you have been given a narcotic such as Vicodin  (hydrocodone with acetaminophen), Percocet  (oxycodone with acetaminophen), or codeine, do not drive for four hours after you have taken  "it. If the narcotic contains Tylenol  (acetaminophen), do not take Tylenol  with it. All narcotics will cause constipation, so eat a high fiber diet.      Pyridium  (phenazopyridine) or Uristat  (phenazopyridine) is a prescription medication that numbs the bladder to reduce the burning pain of some UTIs.  The same medication is available in a non-prescription version called Azo-Standard  (phenazopyridine), Urodol  (phenazopyridine), or other brand names. This medication will change the color of the urine and tears (usually blue or orange). If you wear contacts, do not wear them while taking this medication as they may be stained by the medication.    Antibiotic Warning:     If you have been placed on antibiotics - watch for signs of allergic reaction.  These include rash, lip swelling, difficulty breathing, wheezing, and dizziness.  If you develop any of these symptoms, stop the antibiotic immediately and go to an emergency room or urgent care for evaluation.    Probiotics: If you have been given an antibiotic, you may want to also take a probiotic pill or eat yogurt with live cultures. Probiotics have \"good bacteria\" to help your intestines stay healthy. Studies have shown that probiotics help prevent diarrhea and other intestine problems (including C. diff infection) when you take antibiotics. You can buy these without a prescription in the pharmacy section of the store.   If you were given a prescription for medicine here today, be sure to read all of the information (including the package insert) that comes with your prescription.  This will include important information about the medicine, its side effects, and any warnings that you need to know about.  The pharmacist who fills the prescription can provide more information and answer questions you may have about the medicine.  If you have questions or concerns that the pharmacist cannot address, please call or return to the Emergency Department.   Opioid " Medication Information    Pain medications are among the most commonly prescribed medicines, so we are including this information for all our patients. If you did not receive pain medication or get a prescription for pain medicine, you can ignore it.     You may have been given a prescription for an opioid (narcotic) pain medicine and/or have received a pain medicine while here in the Emergency Department. These medicines can make you drowsy or impaired. You must not drive, operate dangerous equipment, or engage in any other dangerous activities while taking these medications. If you drive while taking these medications, you could be arrested for DUI, or driving under the influence. Do not drink any alcohol while you are taking these medications.     Opioid pain medications can cause addiction. If you have a history of chemical dependency of any type, you are at a higher risk of becoming addicted to pain medications.  Only take these prescribed medications to treat your pain when all other options have been tried. Take it for as short a time and as few doses as possible. Store your pain pills in a secure place, as they are frequently stolen and provide a dangerous opportunity for children or visitors in your house to start abusing these powerful medications. We will not replace any lost or stolen medicine.  As soon as your pain is better, you should flush all your remaining medication.     Many prescription pain medications contain Tylenol  (acetaminophen), including Vicodin , Tylenol #3 , Norco , Lortab , and Percocet .  You should not take any extra pills of Tylenol  if you are using these prescription medications or you can get very sick.  Do not ever take more than 3000 mg of acetaminophen in any 24 hour period.    All opioids tend to cause constipation. Drink plenty of water and eat foods that have a lot of fiber, such as fruits, vegetables, prune juice, apple juice and high fiber cereal.  Take a laxative if  you don t move your bowels at least every other day. Miralax , Milk of Magnesia, Colace , or Senna  can be used to keep you regular.      Remember that you can always come back to the Emergency Department if you are not able to see your regular doctor in the amount of time listed above, if you get any new symptoms, or if there is anything that worries you.        24 Hour Appointment Hotline       To make an appointment at any [facility] call [phone] ([phone]). If you don't have a family doctor or clinic, we will help you find one. [facility] are conveniently located to serve the needs of you and your family.             Review of your medicines      START taking        Dose / Directions Last dose taken    amoxicillin-clavulanate 875-125 MG per tablet   Commonly known as:  AUGMENTIN   Dose:  1 tablet   Quantity:  20 tablet        Take 1 tablet by mouth 2 times daily for 10 days   Refills:  0          Our records show that you are taking the medicines listed below. If these are incorrect, please call your family doctor or clinic.        Dose / Directions Last dose taken    acetaminophen 500 MG tablet   Commonly known as:  TYLENOL   Dose:  500 mg        Take 500 mg by mouth every 6 hours as needed for mild pain   Refills:  0        AMLODIPINE BESYLATE PO   Dose:  10 mg        Take 10 mg by mouth At Bedtime   Refills:  0        ASPIRIN PO   Dose:  81 mg        Take 81 mg by mouth daily   Refills:  0        blood glucose monitoring lancets        TEST ONCE D   Refills:  2        CITALOPRAM HYDROBROMIDE PO   Dose:  20 mg        Take 20 mg by mouth daily.   Refills:  0        CRESTOR PO   Dose:  40 mg        Take 40 mg by mouth At Bedtime   Refills:  0        ferrous sulfate 325 (65 Fe) MG tablet   Commonly known as:  IRON   Dose:  325 mg        Take 325 mg by mouth daily (with breakfast)   Refills:  0        HYDROCHLOROTHIAZIDE PO   Dose:  25 mg        Take 25 mg by mouth daily   Refills:  0         LISINOPRIL PO   Dose:  40 mg        Take 40 mg by mouth daily.   Refills:  0        METFORMIN HCL PO   Dose:  500 mg        Take 500 mg by mouth 2 times daily (with meals)   Refills:  0        OMEPRAZOLE PO   Dose:  20 mg        Take 20 mg by mouth every morning   Refills:  0        ONETOUCH ULTRA test strip   Generic drug:  blood glucose monitoring        daily   Refills:  3        RANITIDINE HCL PO   Dose:  150 mg        Take 150 mg by mouth At Bedtime   Refills:  0        SHINGRIX injection   Generic drug:  zoster vaccine recombinant adjuvanted        U UTD   Refills:  0        vardenafil 20 MG tablet   Commonly known as:  LEVITRA   Dose:  10 mg        Take 10 mg by mouth daily as needed   Refills:  0        Vitamin D (Cholecalciferol) 1000 units Caps   Dose:  1 capsule        Take 1 capsule by mouth daily   Refills:  0                Prescriptions were sent or printed at these locations (1 Prescription)                   [facility] [address] [place] [address] [place], [place] - [address] [facility] [address] [place] [address]    Telephone:  [phone]   Fax:  [phone]   Hours:                  E-Prescribed (1 of 1)         amoxicillin-clavulanate (AUGMENTIN) 875-125 MG per tablet                Procedures and tests performed during your visit     Basic metabolic panel    CBC with platelets differential    EKG 12-lead, tracing only    UA with Microscopic    Urine Culture Aerobic Bacterial      Orders Needing Specimen Collection     None      Pending Results     Date and Time Order Name Status Description    9/14/2018 1845 Urine Culture Aerobic Bacterial In process             Pending Culture Results     Date and Time Order Name Status Description    9/14/2018 1845 Urine Culture Aerobic Bacterial In process             Pending Results Instructions     If you had any lab results that were not finalized at the time of your Discharge, you can call the ED Lab Result RN at [phone].  You will be contacted by this team for any positive Lab results or changes in treatment. The nurses are available 7 days a week from 10A to 6:30P.  You can leave a message 24 hours per day and they will return your call.        Test Results From Your Hospital Stay        9/14/2018  5:01 PM      Component Results     Component Value Ref Range & Units Status    WBC 13.3 (H) 4.0 - 11.0 10e9/L Final    RBC Count 4.10 (L) 4.4 - 5.9 10e12/L Final    Hemoglobin 11.9 (L) 13.3 - 17.7 g/dL Final    Hematocrit 34.7 (L) 40.0 - 53.0 % Final    MCV 85 78 - 100 fl Final    MCH 29.0 26.5 - 33.0 pg Final    MCHC 34.3 31.5 - 36.5 g/dL Final    RDW 14.9 10.0 - 15.0 % Final    Platelet Count 208 150 - 450 10e9/L Final    Diff Method Automated Method  Final    % Neutrophils 85.8 % Final    % Lymphocytes 5.0 % Final    % Monocytes 8.7 % Final    % Eosinophils 0.0 % Final    % Basophils 0.2 % Final    % Immature Granulocytes 0.3 % Final    Nucleated RBCs 0 0 /100 Final    Absolute Neutrophil 11.4 (H) 1.6 - 8.3 10e9/L Final    Absolute Lymphocytes 0.7 (L) 0.8 - 5.3 10e9/L Final    Absolute Monocytes 1.2 0.0 - 1.3 10e9/L Final    Absolute Eosinophils 0.0 0.0 - 0.7 10e9/L Final    Absolute Basophils 0.0 0.0 - 0.2 10e9/L Final    Abs Immature Granulocytes 0.0 0 - 0.4 10e9/L Final    Absolute Nucleated RBC 0.0  Final         9/14/2018  5:15 PM      Component Results     Component Value Ref Range & Units Status    Sodium 133 133 - 144 mmol/L Final    Potassium 3.9 3.4 - 5.3 mmol/L Final    Chloride 100 94 - 109 mmol/L Final    Carbon Dioxide 28 20 - 32 mmol/L Final    Anion Gap 5 3 - 14 mmol/L Final    Glucose 265 (H) 70 - 99 mg/dL Final    Urea Nitrogen 27 7 - 30 mg/dL Final    Creatinine 1.96 (H) 0.66 - 1.25 mg/dL Final    GFR Estimate 33 (L) >60 mL/min/1.7m2 Final    Non  GFR Calc    GFR Estimate If Black 40 (L) >60 mL/min/1.7m2 Final    African American GFR Calc    Calcium 9.0 8.5 - 10.1 mg/dL Final         9/14/2018  6:42  PM      Component Results     Component Value Ref Range & Units Status    Color Urine Yellow  Final    Appearance Urine Slightly Cloudy  Final    Glucose Urine Negative NEG^Negative mg/dL Final    Bilirubin Urine Negative NEG^Negative Final    Ketones Urine Negative NEG^Negative mg/dL Final    Specific Gravity Urine 1.015 1.003 - 1.035 Final    Blood Urine Trace (A) NEG^Negative Final    pH Urine 5.5 5.0 - 7.0 pH Final    Protein Albumin Urine 100 (A) NEG^Negative mg/dL Final    Urobilinogen mg/dL Normal 0.0 - 2.0 mg/dL Final    Nitrite Urine Negative NEG^Negative Final    Leukocyte Esterase Urine Large (A) NEG^Negative Final    Source Midstream Urine  Final    WBC Urine 100 (H) 0 - 5 /HPF Final    RBC Urine 5 (H) 0 - 2 /HPF Final    WBC Clumps Present (A) NEG^Negative /HPF Final    Squamous Epithelial /HPF Urine 2 (H) 0 - 1 /HPF Final    Mucous Urine Present (A) NEG^Negative /LPF Final    Hyaline Casts 13 (H) 0 - 2 /LPF Final    Granular Casts 19 (A) NEG^Negative /LPF Final         9/14/2018  6:50 PM                Clinical Quality Measure: Blood Pressure Screening     Your blood pressure was checked while you were in the emergency department today. The last reading we obtained was  BP: 124/46 . Please read the guidelines below about what these numbers mean and what you should do about them.  If your systolic blood pressure (the top number) is less than 120 and your diastolic blood pressure (the bottom number) is less than 80, then your blood pressure is normal. There is nothing more that you need to do about it.  If your systolic blood pressure (the top number) is 120-139 or your diastolic blood pressure (the bottom number) is 80-89, your blood pressure may be higher than it should be. You should have your blood pressure rechecked within a year by a primary care provider.  If your systolic blood pressure (the top number) is 140 or greater or your diastolic blood pressure (the bottom number) is 90 or greater, you  "may have high blood pressure. High blood pressure is treatable, but if left untreated over time it can put you at risk for heart attack, stroke, or kidney failure. You should have your blood pressure rechecked by a primary care provider within the next 4 weeks.  If your provider in the emergency department today gave you specific instructions to follow-up with your doctor or provider even sooner than that, you should follow that instruction and not wait for up to 4 weeks for your follow-up visit.        Thank you for choosing [facility]       Thank you for choosing [facility] for your care. Our goal is always to provide you with excellent care. Hearing back from our patients is one way we can continue to improve our services. Please take a few minutes to complete the written survey that you may receive in the mail after you visit with us. Thank you!        [facility] Information     Tugg lets you send messages to your doctor, view your test results, renew your prescriptions, schedule appointments and more. To sign up, go to [email or web address] . Click on \"Log in\" on the left side of the screen, which will take you to the Welcome page. Then click on \"Sign up Now\" on the right side of the page.     You will be asked to enter the access code listed below, as well as some personal information. Please follow the directions to create your username and password.     Your access code is: [email or web address]  Expires: 2018  8:28 PM     Your access code will  in 90 days. If you need help or a new code, please call your [facility] clinic or [phone].        Care EveryWhere ID     This is your Care EveryWhere ID. This could be used by other organizations to access your [facility] medical records  [id number]        Equal Access to Services     KAMILLA TOWNSEND : halima Telles qaybta kaalmada adeegyada, waxay idiin hayaan adeeg kharash la'aan ah. So [facility] [phone].    ATENCIÓN: Si habla " español, tiene a frost disposición servicios gratuitos de asistencia lingüística. Artis al [phone].    We comply with applicable federal civil rights laws and [place] laws. We do not discriminate on the basis of race, color, national origin, age, disability, sex, sexual orientation, or gender identity.            After Visit Summary       This is your record. Keep this with you and show to your community pharmacist(s) and doctor(s) at your next visit.

## 2018-09-15 NOTE — DISCHARGE INSTRUCTIONS
YOU ARE BEING STARTED ON AN ANTIBIOTIC, A FLUOROQUINOLONE, THAT HAS THAT POTENTIAL TO CAUSE TENDON INFLAMMATION AND, IN THE MOST SEVERE CASES, TENDON RUPTURE. BECAUSE OF THIS DO NOT DO STRENUOUS ACTIVITY FOR THE NEXT SEVERAL WEEKS. ALSO, IF YOU DEVELOP PAIN OR TENDERNESS OVER YOUR TENDONS, SUCH AS YOUR ACHILLES TENDON, STOP THIS MEDICATION IMMEDIATELY AND GET SEEN BY A PHYSICIAN.    Discharge Instructions  Urinary Tract Infection  You have urinary tract infection, or UTI. The urinary tract includes the kidneys (which make urine), ureters (the tubes that carry urine from the kidneys to the bladder), the bladder (which stores urine), and urethra (the tube that carries urine out of the bladder).  Urinary tract infections occur when bacteria travel up the urethra into the bladder. We suspect a UTI based on chemical and microscopic findings in your urine, but if there is a question about your findings, we will do a culture to see if bacteria grow. A urine culture takes several days. You should always follow-up with your primary physician to find out about results of your culture if one was done.   Return to the Emergency Department if:    You have severe back pain.    You are vomiting so that you can t take your medicine, or have signs of dehydration (such as urinating less than 3 times per day).    You have fever over 101.5 degrees F.    You have significant confusion or are very weak, or feel very ill.    Your child seems much more ill, won t wake up, won t respond right, or is crying for a long time and won t calm down.    Your child is showing signs of dehydration, Signs of dehydration can be:  o Your infant has had no wet diapers in 4-5 hours.  o Your older child has not passed urine in 6-8 hours.  o Your infant or child starts to have dry mouth and lips, or no saliva or tears.    Follow-up with your doctor:     Children under 24 months need to be seen by their regular doctor within one week after a diagnosis of a  UTI. It may be necessary to do some imaging tests to look at the child s kidney or bladder.    You should begin to feel better within 24 - 48 hours of starting your antibiotic.  If you do not, you need to be seen again.      Treatment:     You will be treated with an antibiotic to kill the bacteria. We have to make an educated guess as to which antibiotic will work for your infection. In most healthy people, we can guess right almost all of the time. Sometimes a culture is done to show which antibiotics will work. This usually takes 2-3 days. When the culture is done, we may have to contact you to put you on a different antibiotic.    Take a pain medication such as Tylenol  (acetaminophen), Advil  (ibuprofen), Nuprin  (ibuprofen), or Aleve  (naproxen). If you have been given a narcotic such as Vicodin  (hydrocodone with acetaminophen), Percocet  (oxycodone with acetaminophen), or codeine, do not drive for four hours after you have taken it. If the narcotic contains Tylenol  (acetaminophen), do not take Tylenol  with it. All narcotics will cause constipation, so eat a high fiber diet.      Pyridium  (phenazopyridine) or Uristat  (phenazopyridine) is a prescription medication that numbs the bladder to reduce the burning pain of some UTIs.  The same medication is available in a non-prescription version called Azo-Standard  (phenazopyridine), Urodol  (phenazopyridine), or other brand names. This medication will change the color of the urine and tears (usually blue or orange). If you wear contacts, do not wear them while taking this medication as they may be stained by the medication.    Antibiotic Warning:     If you have been placed on antibiotics - watch for signs of allergic reaction.  These include rash, lip swelling, difficulty breathing, wheezing, and dizziness.  If you develop any of these symptoms, stop the antibiotic immediately and go to an emergency room or urgent care for evaluation.    Probiotics: If you  "have been given an antibiotic, you may want to also take a probiotic pill or eat yogurt with live cultures. Probiotics have \"good bacteria\" to help your intestines stay healthy. Studies have shown that probiotics help prevent diarrhea and other intestine problems (including C. diff infection) when you take antibiotics. You can buy these without a prescription in the pharmacy section of the store.   If you were given a prescription for medicine here today, be sure to read all of the information (including the package insert) that comes with your prescription.  This will include important information about the medicine, its side effects, and any warnings that you need to know about.  The pharmacist who fills the prescription can provide more information and answer questions you may have about the medicine.  If you have questions or concerns that the pharmacist cannot address, please call or return to the Emergency Department.   Opioid Medication Information    Pain medications are among the most commonly prescribed medicines, so we are including this information for all our patients. If you did not receive pain medication or get a prescription for pain medicine, you can ignore it.     You may have been given a prescription for an opioid (narcotic) pain medicine and/or have received a pain medicine while here in the Emergency Department. These medicines can make you drowsy or impaired. You must not drive, operate dangerous equipment, or engage in any other dangerous activities while taking these medications. If you drive while taking these medications, you could be arrested for DUI, or driving under the influence. Do not drink any alcohol while you are taking these medications.     Opioid pain medications can cause addiction. If you have a history of chemical dependency of any type, you are at a higher risk of becoming addicted to pain medications.  Only take these prescribed medications to treat your pain when all " other options have been tried. Take it for as short a time and as few doses as possible. Store your pain pills in a secure place, as they are frequently stolen and provide a dangerous opportunity for children or visitors in your house to start abusing these powerful medications. We will not replace any lost or stolen medicine.  As soon as your pain is better, you should flush all your remaining medication.     Many prescription pain medications contain Tylenol  (acetaminophen), including Vicodin , Tylenol #3 , Norco , Lortab , and Percocet .  You should not take any extra pills of Tylenol  if you are using these prescription medications or you can get very sick.  Do not ever take more than 3000 mg of acetaminophen in any 24 hour period.    All opioids tend to cause constipation. Drink plenty of water and eat foods that have a lot of fiber, such as fruits, vegetables, prune juice, apple juice and high fiber cereal.  Take a laxative if you don t move your bowels at least every other day. Miralax , Milk of Magnesia, Colace , or Senna  can be used to keep you regular.      Remember that you can always come back to the Emergency Department if you are not able to see your regular doctor in the amount of time listed above, if you get any new symptoms, or if there is anything that worries you.

## 2018-09-16 LAB
BACTERIA SPEC CULT: ABNORMAL
SPECIMEN SOURCE: ABNORMAL

## 2018-11-26 ENCOUNTER — TRANSFERRED RECORDS (OUTPATIENT)
Dept: HEALTH INFORMATION MANAGEMENT | Facility: CLINIC | Age: 78
End: 2018-11-26

## 2018-11-26 LAB — HBA1C MFR BLD: 6.7 % (ref 4.8–5.6)

## 2019-01-22 ENCOUNTER — OFFICE VISIT (OUTPATIENT)
Dept: PHARMACY | Facility: PHYSICIAN GROUP | Age: 79
End: 2019-01-22

## 2019-01-22 VITALS
HEIGHT: 68 IN | WEIGHT: 169 LBS | SYSTOLIC BLOOD PRESSURE: 116 MMHG | DIASTOLIC BLOOD PRESSURE: 62 MMHG | BODY MASS INDEX: 25.61 KG/M2 | HEART RATE: 68 BPM

## 2019-01-22 DIAGNOSIS — E63.9 NUTRITIONAL DEFICIENCY: ICD-10-CM

## 2019-01-22 DIAGNOSIS — F41.9 ANXIETY: ICD-10-CM

## 2019-01-22 DIAGNOSIS — E78.5 HYPERLIPIDEMIA LDL GOAL <100: ICD-10-CM

## 2019-01-22 DIAGNOSIS — E11.9 CONTROLLED TYPE 2 DIABETES MELLITUS WITHOUT COMPLICATION, WITHOUT LONG-TERM CURRENT USE OF INSULIN (H): Primary | ICD-10-CM

## 2019-01-22 DIAGNOSIS — I10 BENIGN ESSENTIAL HYPERTENSION: ICD-10-CM

## 2019-01-22 DIAGNOSIS — K21.9 GASTROESOPHAGEAL REFLUX DISEASE, ESOPHAGITIS PRESENCE NOT SPECIFIED: ICD-10-CM

## 2019-01-22 PROCEDURE — 99605 MTMS BY PHARM NP 15 MIN: CPT | Performed by: PHARMACIST

## 2019-01-22 PROCEDURE — 99607 MTMS BY PHARM ADDL 15 MIN: CPT | Performed by: PHARMACIST

## 2019-01-22 ASSESSMENT — MIFFLIN-ST. JEOR: SCORE: 1471.08

## 2019-01-22 NOTE — PROGRESS NOTES
SUBJECTIVE/OBJECTIVE:                Michael Howard is a 78 year old male coming in for a follow-up visit for Medication Therapy Management.  He was referred to me from Dr. Sutton.     Chief Complaint: Follow up from our visit on 7/3/18.  First visit of the year    Tobacco: No tobacco use  Alcohol: not currently using    Medication Adherence/Access:  no issues reported    Diabetes:  Pt currently taking metformin 500mg BID, reduced due to renal function.  Pt is not experiencing side effects.  Last A1c was 6.7% on 11/26/18.  SMBG: one time daily (AM fasting). Ranges (patient reported): 109-140mg/dl  Symptoms of low blood sugar? shaky, weak, sweaty. Frequency of hypoglycemia? None recently  Recent symptoms of high blood sugar? none  Microalbumin is not < 30 mg/g. Pt is taking an ACEi/ARB.  Aspirin: Taking 81mg daily and denies side effects.    Hypertension: Current medications include amlodipine 5mg daily, HCTZ 25mg daily, and lisinopril 40mg daily.  Patient does not self-monitor BP.  Patient reports the following medication side effects: none.     Hyperlipidemia: Current therapy includes Crestor 40mg daily. No longer on fenofibrate as of last visit due to not needed and renal fxn.  Pt reports no significant myalgias or other side effects.    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  Current medications include: omeprazole 20mg daily and ranitidine 150mg daily (reduced due to renal function). Pt c/o no current symptoms.  Patient feels that current regimen is effective.  Crcl=38ml/min using Scr=1.69 on 11/26/18  GFR=39mL/min/1.7m2    Anxiety: Current medications include: citalopram 20mg daily.  He feels this works well and he denies side effects of therapy.  He denies any trouble with his anxiety at this time. Lost his wife several years ago which has been difficult for him. Has a long  "standing relationship with Dr. Sutton.     Supplements: Currently taking vitamin D 1000 daily and now on iron 325mg twice daily from his nephrologist. Not on calcium due to hx of kidney stones. Denies issues at this time.       Today's Vitals: /62   Pulse 68   Ht 5' 8\" (1.727 m)   Wt 169 lb (76.7 kg)   BMI 25.70 kg/m        ASSESSMENT:              Current medications were reviewed today as discussed above.      Medication Adherence: good, no issues identified    Diabetes: Stable, A1c at goal of <7%.     Hypertension: Stable. BP at goal of <140/90mmHg.    Hyperlipidemia: Stable.    GERD: Stable.    Anxiety: Stable.    Supplements: Stable.     PLAN:                  1.No changes at this time.    I spent 20 minutes with this patient today. All changes were made via collaborative practice agreement with Dr. Sutton. A copy of the visit note was provided to the patient's primary care provider.     Will follow up in 1 year or sooner if needed.    The patient declined a summary of these recommendations as an after visit summary.        Nimisha Armendariz, Pharm.D, [facility]CP  Medication Therapy Management Pharmacist  [phone]  "

## 2019-04-10 DIAGNOSIS — N42.81 PROSTATE PAIN: Primary | ICD-10-CM

## 2019-04-17 ENCOUNTER — OFFICE VISIT (OUTPATIENT)
Dept: UROLOGY | Facility: CLINIC | Age: 79
End: 2019-04-17
Payer: COMMERCIAL

## 2019-04-17 VITALS
HEART RATE: 54 BPM | HEIGHT: 69 IN | SYSTOLIC BLOOD PRESSURE: 138 MMHG | WEIGHT: 164 LBS | DIASTOLIC BLOOD PRESSURE: 68 MMHG | BODY MASS INDEX: 24.29 KG/M2

## 2019-04-17 DIAGNOSIS — N42.81 PROSTATE PAIN: Primary | ICD-10-CM

## 2019-04-17 LAB
ALBUMIN UR-MCNC: 30 MG/DL
APPEARANCE UR: CLEAR
BILIRUB UR QL STRIP: NEGATIVE
COLOR UR AUTO: YELLOW
GLUCOSE UR STRIP-MCNC: NEGATIVE MG/DL
HGB UR QL STRIP: NEGATIVE
KETONES UR STRIP-MCNC: NEGATIVE MG/DL
LEUKOCYTE ESTERASE UR QL STRIP: NEGATIVE
NITRATE UR QL: NEGATIVE
PH UR STRIP: 6 PH (ref 5–7)
PR INTERVAL - MUSE: NORMAL
SOURCE: ABNORMAL
SP GR UR STRIP: 1.01 (ref 1–1.03)
UROBILINOGEN UR STRIP-ACNC: 0.2 EU/DL (ref 0.2–1)

## 2019-04-17 PROCEDURE — 51798 US URINE CAPACITY MEASURE: CPT | Performed by: UROLOGY

## 2019-04-17 PROCEDURE — 81003 URINALYSIS AUTO W/O SCOPE: CPT | Performed by: UROLOGY

## 2019-04-17 PROCEDURE — 99213 OFFICE O/P EST LOW 20 MIN: CPT | Mod: 25 | Performed by: UROLOGY

## 2019-04-17 ASSESSMENT — MIFFLIN-ST. JEOR: SCORE: 1456.34

## 2019-04-17 ASSESSMENT — PAIN SCALES - GENERAL: PAINLEVEL: MILD PAIN (2)

## 2019-04-17 NOTE — LETTER
RE: Michael Howard  [address] [place] [address]     Dear Colleague,    Thank you for referring your patient, Michael Howard, to the [facility] at [facility]. Please see a copy of my visit note below.    Michael garcia is a 78-year-old male with a history of prostatitis and urolithiasis.  For several weeks he has had prepubic discomfort and has had some dysuria.  He has had no gross hematuria and has a normal urinalysis today and a postvoid residual of only 6 cc.  He was in the emergency room a few months ago and was treated with Augmentin for an enterococcal urinary tract infection.  I suspect the source was his prostate.  He had recurrence of his symptoms several weeks later and went to urgent care but they did not do a culture and they sent him home on sulfa DS for 10 days.  Other past medical history: Adenomatous colon polyp, arrhythmia, Wright esophagus, cataracts, chronic renal disease, diabetes, diverticulitis, Dupuytren's contracture, GERD, hyperlipidemia, hypertension, depression, history of bladder stones and BPH, history of urethral stricture, non-smoker.  Surgeries include appendectomy, ESWL, herniorrhaphy, cardiac surgery, vasectomy, colonoscopy, TURP, eye surgeries, ENT surgery  Medications: Tylenol, amlodipine, baby aspirin, citalopram, iron sulfate, hydrochlorothiazide, lisinopril, metformin, omeprazole, Crestor, Levitra, vitamin D  Allergies: Cipro  Review of systems: As above.  No fever chills or night sweats.  No back pain or flank pain  Exam: Alert and oriented, normal appearance, normal vital signs normal external genitalia.  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  Augmentin was the correct drug but probably not long enough treatment  Plan: Post ejaculatory urine culture to  confirm and then treat for several weeks.  See me later this year with KUB for urolithiasis    Again, thank you for allowing me to participate in the care of your patient.      Sincerely,    Giovanny Ross MD

## 2019-04-17 NOTE — NURSING NOTE
Chief Complaint   Patient presents with     Clinic Care Coordination - Follow-up     Prostate Pain      Marcela Armstrong LPN

## 2019-04-17 NOTE — PROGRESS NOTES
Michael garcia is a 78-year-old male with a history of prostatitis and urolithiasis.  For several weeks he has had prepubic discomfort and has had some dysuria.  He has had no gross hematuria and has a normal urinalysis today and a postvoid residual of only 6 cc.  He was in the emergency room a few months ago and was treated with Augmentin for an enterococcal urinary tract infection.  I suspect the source was his prostate.  He had recurrence of his symptoms several weeks later and went to urgent care but they did not do a culture and they sent him home on sulfa DS for 10 days.  Other past medical history: Adenomatous colon polyp, arrhythmia, Wright esophagus, cataracts, chronic renal disease, diabetes, diverticulitis, Dupuytren's contracture, GERD, hyperlipidemia, hypertension, depression, history of bladder stones and BPH, history of urethral stricture, non-smoker.  Surgeries include appendectomy, ESWL, herniorrhaphy, cardiac surgery, vasectomy, colonoscopy, TURP, eye surgeries, ENT surgery  Medications: Tylenol, amlodipine, baby aspirin, citalopram, iron sulfate, hydrochlorothiazide, lisinopril, metformin, omeprazole, Crestor, Levitra, vitamin D  Allergies: Cipro  Review of systems: As above.  No fever chills or night sweats.  No back pain or flank pain  Exam: Alert and oriented, normal appearance, normal vital signs normal external genitalia.  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  Augmentin was the correct drug but probably not long enough treatment  Plan: Post ejaculatory urine culture to confirm and then treat for several weeks.  See me later this year with KUB for urolithiasis

## 2019-04-18 DIAGNOSIS — N42.81 PROSTATE PAIN: Primary | ICD-10-CM

## 2019-04-18 PROCEDURE — 87086 URINE CULTURE/COLONY COUNT: CPT | Performed by: UROLOGY

## 2019-04-19 LAB
BACTERIA SPEC CULT: NORMAL
Lab: NORMAL
SPECIMEN SOURCE: NORMAL

## 2019-04-23 ENCOUNTER — TELEPHONE (OUTPATIENT)
Dept: UROLOGY | Facility: CLINIC | Age: 79
End: 2019-04-23

## 2019-04-23 DIAGNOSIS — R10.2 PELVIC PAIN IN MALE: Primary | ICD-10-CM

## 2019-04-23 DIAGNOSIS — R10.84 ABDOMINAL PAIN, GENERALIZED: Primary | ICD-10-CM

## 2019-04-23 NOTE — TELEPHONE ENCOUNTER
Giovanny Ross MD Hummel, Jean M, JEREMY   Phone Number: [phone]             See me with CT abdomen/pelvis without contrast and for office cysto please             I called and gave him this message.  [facility]  April 23, 2019 3:58 PM        Patient is calling for the results of the UA/UC from last week.  I see that  the results were normal.  He is having pain and wants to know what he should do.   [facility]  April 23, 2019 10:06 AM

## 2019-05-07 ENCOUNTER — TRANSFERRED RECORDS (OUTPATIENT)
Dept: HEALTH INFORMATION MANAGEMENT | Facility: CLINIC | Age: 79
End: 2019-05-07

## 2019-05-15 ENCOUNTER — OFFICE VISIT (OUTPATIENT)
Dept: UROLOGY | Facility: CLINIC | Age: 79
End: 2019-05-15
Payer: COMMERCIAL

## 2019-05-15 ENCOUNTER — HOSPITAL ENCOUNTER (OUTPATIENT)
Dept: CT IMAGING | Facility: CLINIC | Age: 79
Discharge: HOME | End: 2019-05-15
Attending: UROLOGY | Admitting: UROLOGY
Payer: COMMERCIAL

## 2019-05-15 VITALS
SYSTOLIC BLOOD PRESSURE: 122 MMHG | DIASTOLIC BLOOD PRESSURE: 60 MMHG | HEART RATE: 66 BPM | HEIGHT: 69 IN | BODY MASS INDEX: 24.14 KG/M2 | OXYGEN SATURATION: 98 % | WEIGHT: 163 LBS

## 2019-05-15 DIAGNOSIS — R10.2 PELVIC PAIN IN MALE: ICD-10-CM

## 2019-05-15 DIAGNOSIS — Z79.2 PROPHYLACTIC ANTIBIOTIC: ICD-10-CM

## 2019-05-15 DIAGNOSIS — R30.0 DYSURIA: Primary | ICD-10-CM

## 2019-05-15 DIAGNOSIS — N20.9 CALCIUM UROLITHIASIS: ICD-10-CM

## 2019-05-15 PROBLEM — E11.9 DIABETES MELLITUS, TYPE 2 (H): Status: ACTIVE | Noted: 2019-05-15

## 2019-05-15 LAB
ALBUMIN UR-MCNC: 30 MG/DL
APPEARANCE UR: CLEAR
BILIRUB UR QL STRIP: NEGATIVE
COLOR UR AUTO: YELLOW
GLUCOSE UR STRIP-MCNC: NEGATIVE MG/DL
HGB UR QL STRIP: NEGATIVE
KETONES UR STRIP-MCNC: NEGATIVE MG/DL
LEUKOCYTE ESTERASE UR QL STRIP: NEGATIVE
NITRATE UR QL: NEGATIVE
PH UR STRIP: 6 PH (ref 5–7)
SOURCE: ABNORMAL
SP GR UR STRIP: 1.01 (ref 1–1.03)
UROBILINOGEN UR STRIP-ACNC: 0.2 EU/DL (ref 0.2–1)

## 2019-05-15 PROCEDURE — 52000 CYSTOURETHROSCOPY: CPT | Performed by: UROLOGY

## 2019-05-15 PROCEDURE — 81003 URINALYSIS AUTO W/O SCOPE: CPT | Performed by: UROLOGY

## 2019-05-15 PROCEDURE — 74176 CT ABD & PELVIS W/O CONTRAST: CPT

## 2019-05-15 PROCEDURE — 99213 OFFICE O/P EST LOW 20 MIN: CPT | Mod: 25 | Performed by: UROLOGY

## 2019-05-15 RX ORDER — LIDOCAINE HYDROCHLORIDE 20 MG/ML
JELLY TOPICAL ONCE
Status: DISCONTINUED | OUTPATIENT
Start: 2019-05-15 | End: 2019-05-15 | Stop reason: HOSPADM

## 2019-05-15 RX ORDER — CIPROFLOXACIN 500 MG/1
500 TABLET, FILM COATED ORAL ONCE
Qty: 1 TABLET | Refills: 0 | Status: SHIPPED | OUTPATIENT
Start: 2019-05-15 | End: 2019-05-15

## 2019-05-15 RX ORDER — SULFAMETHOXAZOLE/TRIMETHOPRIM 800-160 MG
1 TABLET ORAL ONCE
Qty: 1 TABLET | Refills: 0 | Status: SHIPPED | OUTPATIENT
Start: 2019-05-15 | End: 2019-05-15

## 2019-05-15 ASSESSMENT — PAIN SCALES - GENERAL: PAINLEVEL: MILD PAIN (2)

## 2019-05-15 ASSESSMENT — MIFFLIN-ST. JEOR: SCORE: 1451.8

## 2019-05-15 NOTE — LETTER
5/15/2019      RE: Michael Howard  [address] [place] [address]       Mr. Carbone returns today for office cystoscopy.  His CT scan without contrast shows no renal mass, no hydronephrosis, tiny stones in the lower pole of the left kidney, no ureteral calculi, normal bladder, no open prostatic fossa, no prostatic calcifications, no hernias.  Other past medical history reviewed from his April 17 visit-no change  Medications: Tylenol, amlodipine, baby aspirin, citalopram, iron sulfate, hydrochlorothiazide, lisinopril, metformin, omeprazole, Crestor, Levitra, vitamin D  Allergies: Cipro  Review of systems: No gross hematuria  Post ejaculatory urine culture negative.  Recent creatinine 1.74.  Patient has been advised to follow a low oxalate, low sodium diet and drink 2 to 3 L of water a day for his stone disease.  Exam: Alert and oriented, normal appearance, normal external genitalia and urethral meatus  Flexible cystoscopy: Normal urethra, normal sphincter with good relaxation, open prostatic fossa normal prostatic mucosa and bladder neck.  No bladder tumors or stones, 3+ trabeculation with a few small normal-appearing diverticuli, normal ureteral orifices, small postvoid residual, no raised erythema, fistula  Assessment: Normal examination.  Pelvic pain may be muscular and patient may benefit from physical therapy and learn pelvic relaxation-discussed at length.  He has no evidence for interstitial cystitis.  Symptoms do not correlate with GI tract  Plan: Septra DS x1 today.  Trial of physical therapy to learn pelvic relaxation.  See me 1 year with LOREE Ross MD

## 2019-05-15 NOTE — NURSING NOTE
"Chief Complaint   Patient presents with     Dysuria     Patient here today for Cystoscopy       Blood pressure 122/60, pulse 66, height 1.74 m (5' 8.5\"), weight 73.9 kg (163 lb), SpO2 98 %. Body mass index is 24.42 kg/m .    Patient Active Problem List   Diagnosis     Chronic kidney disease, stage III (moderate)     BPH (benign prostatic hyperplasia)     ACP (advance care planning)     Diabetes mellitus, type 2 (H)       Allergies   Allergen Reactions     Ciprofloxacin      Gi bleed       Current Outpatient Medications   Medication Sig Dispense Refill     acetaminophen (TYLENOL) 500 MG tablet Take 500 mg by mouth every 6 hours as needed for mild pain       AMLODIPINE BESYLATE PO Take 10 mg by mouth At Bedtime        ASPIRIN PO Take 81 mg by mouth daily       blood glucose monitoring (ONE TOUCH ULTRASOFT) lancets TEST ONCE D  2     CITALOPRAM HYDROBROMIDE PO Take 20 mg by mouth daily.       ferrous sulfate (IRON) 325 (65 Fe) MG tablet Take 325 mg by mouth daily (with breakfast)       HYDROCHLOROTHIAZIDE PO Take 25 mg by mouth daily       LISINOPRIL PO Take 40 mg by mouth daily.       METFORMIN HCL PO Take 500 mg by mouth 2 times daily (with meals)        OMEPRAZOLE PO Take 20 mg by mouth every morning       ONETOUCH ULTRA test strip daily  3     RANITIDINE HCL PO Take 150 mg by mouth At Bedtime        Rosuvastatin Calcium (CRESTOR PO) Take 40 mg by mouth At Bedtime        SHINGRIX injection U UTD  0     sulfamethoxazole-trimethoprim (BACTRIM DS/SEPTRA DS) 800-160 MG tablet Take 1 tablet by mouth once for 1 dose 1 tablet 0     vardenafil (LEVITRA) 20 MG tablet Take 10 mg by mouth daily as needed       Vitamin D, Cholecalciferol, 1000 UNITS CAPS Take 1 capsule by mouth daily         Social History     Tobacco Use     Smoking status: Never Smoker     Smokeless tobacco: Never Used   Substance Use Topics     Alcohol use: Yes     Comment: Rare glass of wine     Drug use: No       UA RESULTS:  Recent Labs   Lab Test " 05/15/19  0942  09/14/18  1804   COLOR Yellow   < > Yellow   APPEARANCE Clear   < > Slightly Cloudy   URINEGLC Negative   < > Negative   URINEBILI Negative   < > Negative   URINEKETONE Negative   < > Negative   SG 1.015   < > 1.015   UBLD Negative   < > Trace*   URINEPH 6.0   < > 5.5   PROTEIN 30*   < > 100*   UROBILINOGEN 0.2   < >  --    NITRITE Negative   < > Negative   LEUKEST Negative   < > Large*   RBCU  --   --  5*   WBCU  --   --  100*    < > = values in this interval not displayed.       Prior to the start of the procedure and with procedural staff participation, I verbally confirmed the patient s identity using two indicators, relevant allergies, that the procedure was appropriate and matched the consent or emergent situation, and that the correct equipment/implants were available. Immediately prior to starting the procedure I conducted the Time Out with the procedural staff and re-confirmed the patient s name, procedure, and site/side. I have wiped the patient off with the povidone-Iodine solution, draped them,  used Lidocaine hydrochloride jelly, and instilled sterile water into the bladder. (The Joint Commission universal protocol was followed.)  Yes    Sedation (Moderate or Deep): None    5mL 2% lidocaine hydrochloride Urojet instilled into urethra.    NDC# [address]-63  Lot #: [id number]  Expiration Date:  11/20        Ciarra Alway MA  5/15/2019

## 2019-05-15 NOTE — PATIENT INSTRUCTIONS

## 2019-05-15 NOTE — PROGRESS NOTES
Mr. Carbone returns today for office cystoscopy.  His CT scan without contrast shows no renal mass, no hydronephrosis, tiny stones in the lower pole of the left kidney, no ureteral calculi, normal bladder, no open prostatic fossa, no prostatic calcifications, no hernias.  Other past medical history reviewed from his April 17 visit-no change  Medications: Tylenol, amlodipine, baby aspirin, citalopram, iron sulfate, hydrochlorothiazide, lisinopril, metformin, omeprazole, Crestor, Levitra, vitamin D  Allergies: Cipro  Review of systems: No gross hematuria  Post ejaculatory urine culture negative.  Recent creatinine 1.74.  Patient has been advised to follow a low oxalate, low sodium diet and drink 2 to 3 L of water a day for his stone disease.  Exam: Alert and oriented, normal appearance, normal external genitalia and urethral meatus  Flexible cystoscopy: Normal urethra, normal sphincter with good relaxation, open prostatic fossa normal prostatic mucosa and bladder neck.  No bladder tumors or stones, 3+ trabeculation with a few small normal-appearing diverticuli, normal ureteral orifices, small postvoid residual, no raised erythema, fistula  Assessment: Normal examination.  Pelvic pain may be muscular and patient may benefit from physical therapy and learn pelvic relaxation-discussed at length.  He has no evidence for interstitial cystitis.  Symptoms do not correlate with GI tract  Plan: Septra DS x1 today.  Trial of physical therapy to learn pelvic relaxation.  See me 1 year with LOREE

## 2019-05-15 NOTE — LETTER
5/15/2019     RE: Michael Howard  [address] [place] [address]     Dear Colleague,    Thank you for referring your patient, Michael Howard, to the [facility] at [facility] Please see a copy of my visit note below.    Mr. Carbone returns today for office cystoscopy.  His CT scan without contrast shows no renal mass, no hydronephrosis, tiny stones in the lower pole of the left kidney, no ureteral calculi, normal bladder, no open prostatic fossa, no prostatic calcifications, no hernias.  Other past medical history reviewed from his April 17 visit-no change  Medications: Tylenol, amlodipine, baby aspirin, citalopram, iron sulfate, hydrochlorothiazide, lisinopril, metformin, omeprazole, Crestor, Levitra, vitamin D  Allergies: Cipro  Review of systems: No gross hematuria  Post ejaculatory urine culture negative.  Recent creatinine 1.74.  Patient has been advised to follow a low oxalate, low sodium diet and drink 2 to 3 L of water a day for his stone disease.  Exam: Alert and oriented, normal appearance, normal external genitalia and urethral meatus  Flexible cystoscopy: Normal urethra, normal sphincter with good relaxation, open prostatic fossa normal prostatic mucosa and bladder neck.  No bladder tumors or stones, 3+ trabeculation with a few small normal-appearing diverticuli, normal ureteral orifices, small postvoid residual, no raised erythema, fistula  Assessment: Normal examination.  Pelvic pain may be muscular and patient may benefit from physical therapy and learn pelvic relaxation-discussed at length.  He has no evidence for interstitial cystitis.  Symptoms do not correlate with GI tract  Plan: Septra DS x1 today.  Trial of physical therapy to learn pelvic relaxation.  See me 1 year with KUB    Again, thank you for allowing me to participate in the care of your patient.      Sincerely,    Giovanny Ross MD

## 2019-11-20 ENCOUNTER — TELEPHONE (OUTPATIENT)
Dept: UROLOGY | Facility: CLINIC | Age: 79
End: 2019-11-20

## 2019-11-20 NOTE — TELEPHONE ENCOUNTER
Health Call Center    Phone Message    May a detailed message be left on voicemail: no    Reason for Call: Medication Question or concern regarding medication   Prescription Clarification  Name of Medication: Sildenafil  Prescribing Provider: Not listed, Pt says Dr. Ross gave it to him   Pharmacy: [facility], [address], [place]   What on the order needs clarification? Pt is taking half a tablet (20mg) and it's not working. Please call back to discuss side effects and increasing it.          Action Taken: Message routed to:  Other: [facility]

## 2020-03-18 ENCOUNTER — ALLIED HEALTH/NURSE VISIT (OUTPATIENT)
Dept: PHARMACY | Facility: PHYSICIAN GROUP | Age: 80
End: 2020-03-18

## 2020-03-18 DIAGNOSIS — E78.5 HYPERLIPIDEMIA LDL GOAL <100: ICD-10-CM

## 2020-03-18 DIAGNOSIS — K21.9 GASTROESOPHAGEAL REFLUX DISEASE, ESOPHAGITIS PRESENCE NOT SPECIFIED: ICD-10-CM

## 2020-03-18 DIAGNOSIS — E11.9 CONTROLLED TYPE 2 DIABETES MELLITUS WITHOUT COMPLICATION, WITHOUT LONG-TERM CURRENT USE OF INSULIN (H): Primary | ICD-10-CM

## 2020-03-18 DIAGNOSIS — F41.9 ANXIETY: ICD-10-CM

## 2020-03-18 DIAGNOSIS — E63.9 NUTRITIONAL DEFICIENCY: ICD-10-CM

## 2020-03-18 DIAGNOSIS — I10 BENIGN ESSENTIAL HYPERTENSION: ICD-10-CM

## 2020-03-18 PROCEDURE — 99605 MTMS BY PHARM NP 15 MIN: CPT | Performed by: PHARMACIST

## 2020-03-18 NOTE — PROGRESS NOTES
MTM ENCOUNTER  SUBJECTIVE/OBJECTIVE:                           Michael Howard is a 79 year old male called for a follow-up visit. He was referred to me from Dr. Sutton.  Today's visit is a follow-up MTM visit from 1/22/2019     Chief Complaint: Comprehensive medication review, this serves as initial visit for 2020.    Allergies/ADRs: Reviewed in [employer]  Tobacco:  reports that he has never smoked. He has never used smokeless tobacco.  Alcohol: not currently using  PMH: Reviewed in [employer]    Medication Adherence/Access: no issues reported     Diabetes:  Pt currently taking metformin 500mg BID, reduced due to renal function.  Pt is not experiencing side effects.   SMBG: one time daily (AM fasting). Ranges (patient reported): 110-140s fasting  Symptoms of low blood sugar? shaky, weak, sweaty. Frequency of hypoglycemia? None recently  Recent symptoms of high blood sugar? None  Microalbumin is not < 30 mg/g. Pt is taking an ACEi/ARB.  Aspirin: Taking 81mg daily and denies side effects.      Hypertension: Current medications include amlodipine 5mg daily, HCTZ 25mg daily, and lisinopril 40mg daily.  Patient does not self-monitor BP.  Patient reports the following medication side effects: none.   Last CMP on 3/10/2020: sCr 1.40 mg/dL, eGFR 48 mL/min, and electrolytes WNL.      Hyperlipidemia: Current therapy includes Crestor 40mg daily. No longer on fenofibrate as of last visit due to not needed and renal fxn.  Pt reports no significant myalgias or other side effects.    Last lipid panel on 11/19/19: , trigs 177, HDL 44, LDL 63.       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  Current medications include: omeprazole 40mg once daily (switched from 20mg BID) 20mg daily and ranitidine 150mg daily (reduced due to renal function). Pt c/o no current symptoms.  Patient feels that current regimen is effective.    Anxiety: Current medications include: citalopram 20mg daily.  He  feels this works well and he denies side effects of therapy.  He denies any trouble with his anxiety at this time. Lost his wife several years ago which has been difficult for him. Has a long standing relationship with Dr. Sutton.     Supplements: Currently taking vitamin D 1000 daily and now on iron 325mg twice daily from his nephrologist. Not on calcium due to hx of kidney stones. Denies issues at this time.     Today's Vitals: There were no vitals taken for this visit. - phone visit    ASSESSMENT:                              Medication Adherence: good, no issues identified     Diabetes: Stable. Patient is meeting A1c goal of < 8%. Self monitoring of blood glucose is at goal of fasting  mg/dL.    Hypertension: Stable. Patient is meeting BP goal of < 140/90mmHg.     Hyperlipidemia: Stable.     GERD: Stable.      Anxiety: Stable.    Supplements: Stable.    PLAN:                            1. No changes at this time.     I spent 10 mins with this patient today. All changes were made via collaborative practice agreement with Dr. Sutton. A copy of the visit note was provided to the patient's primary care provider.    Will follow up in 6 months or sooner if needed by PCP or patient.    The patient declined a summary of these recommendations.     Nimisha Armendariz, Pharm.D, [facility]  Medication Therapy Management Pharmacist  [phone]

## 2020-07-29 ENCOUNTER — HOSPITAL ENCOUNTER (OUTPATIENT)
Dept: GENERAL RADIOLOGY | Facility: CLINIC | Age: 80
Discharge: HOME | End: 2020-07-29
Attending: UROLOGY | Admitting: UROLOGY
Payer: COMMERCIAL

## 2020-07-29 ENCOUNTER — OFFICE VISIT (OUTPATIENT)
Dept: UROLOGY | Facility: CLINIC | Age: 80
End: 2020-07-29
Payer: COMMERCIAL

## 2020-07-29 VITALS
WEIGHT: 154 LBS | DIASTOLIC BLOOD PRESSURE: 56 MMHG | BODY MASS INDEX: 23.34 KG/M2 | HEIGHT: 68 IN | HEART RATE: 58 BPM | SYSTOLIC BLOOD PRESSURE: 100 MMHG | OXYGEN SATURATION: 97 %

## 2020-07-29 DIAGNOSIS — N20.0 KIDNEY STONES: Primary | ICD-10-CM

## 2020-07-29 DIAGNOSIS — N52.01 ERECTILE DYSFUNCTION DUE TO ARTERIAL INSUFFICIENCY: ICD-10-CM

## 2020-07-29 DIAGNOSIS — N20.0 KIDNEY STONES: ICD-10-CM

## 2020-07-29 LAB
ALBUMIN UR-MCNC: 30 MG/DL
APPEARANCE UR: CLEAR
BILIRUB UR QL STRIP: NEGATIVE
COLOR UR AUTO: YELLOW
GLUCOSE UR STRIP-MCNC: NEGATIVE MG/DL
HGB UR QL STRIP: ABNORMAL
KETONES UR STRIP-MCNC: NEGATIVE MG/DL
LEUKOCYTE ESTERASE UR QL STRIP: NEGATIVE
NITRATE UR QL: NEGATIVE
PH UR STRIP: 5.5 PH (ref 5–7)
SOURCE: ABNORMAL
SP GR UR STRIP: 1.02 (ref 1–1.03)
UROBILINOGEN UR STRIP-ACNC: 0.2 EU/DL (ref 0.2–1)

## 2020-07-29 PROCEDURE — 74019 RADEX ABDOMEN 2 VIEWS: CPT

## 2020-07-29 PROCEDURE — 99213 OFFICE O/P EST LOW 20 MIN: CPT | Performed by: UROLOGY

## 2020-07-29 PROCEDURE — 81003 URINALYSIS AUTO W/O SCOPE: CPT | Performed by: UROLOGY

## 2020-07-29 RX ORDER — SULFAMETHOXAZOLE/TRIMETHOPRIM 800-160 MG
TABLET ORAL
COMMUNITY
Start: 2020-07-23 | End: 2021-11-02

## 2020-07-29 RX ORDER — VARDENAFIL HYDROCHLORIDE 20 MG/1
20 TABLET ORAL DAILY PRN
Qty: 6 TABLET | Refills: 4 | Status: SHIPPED | OUTPATIENT
Start: 2020-07-29 | End: 2021-08-17

## 2020-07-29 ASSESSMENT — MIFFLIN-ST. JEOR: SCORE: 1402.01

## 2020-07-29 ASSESSMENT — PAIN SCALES - GENERAL: PAINLEVEL: NO PAIN (0)

## 2020-07-29 NOTE — LETTER
7/29/2020      RE: Michael Howard  [address] [place] [address]       Michael Howard is a 79-year-old male with a history of BPH and urolithiasis.  His KUB this morning shows no recurrent stones.  Urinalysis is normal but concentrated.  He has had no flank pain, dysuria or hematuria  He has had an 8 pound weight loss in the last year- easily noticed on initial encounter today.  He is eating healthier but is concerned-having GI work-up next month.  He has had no blood in his stool or black tarry stools.  Other past medical history: History of colon polyps, history of SVT status post ablation, Wright's esophagus, cataracts, stage III chronic renal disease, diabetes, diarrhea, diverticulitis, Dupuytren's contracture, GERD, prostatitis, hyperlipidemia, hypertension, depression, right inguinal hernia, bladder stones, urethral stricture.  Status post TURP, appendectomy, ESWL, herniorrhaphy, vasectomy, ENT and eye surgery.  Non-smoker  Medications: Tylenol, amlodipine, aspirin, citalopram, iron sulfate, HCTZ, lisinopril, metformin, omeprazole, Crestor, Bactrim DS for sinuses, Levitra 20 mg, vitamin D  Allergies: Cipro  Review of systems: As above  Exam: Alert and oriented, dyspneic weight loss notable, normal respirations, neuro grossly intact.  Normal sphincter tone, no rectal mass or impaction, small benign and symmetric prostate, normal seminal vesicles  Assessment: BPH, history of stones, weight loss  Plan: GI work-up scheduled.  See urology back in 12 months with KUB, for urinalysis and JOSIANE.  No PSA unless palpable abnormality    Giovanny Ross MD

## 2020-07-29 NOTE — PROGRESS NOTES
Michael Howard is a 79-year-old male with a history of BPH and urolithiasis.  His KUB this morning shows no recurrent stones.  Urinalysis is normal but concentrated.  He has had no flank pain, dysuria or hematuria  He has had an 8 pound weight loss in the last year- easily noticed on initial encounter today.  He is eating healthier but is concerned-having GI work-up next month.  He has had no blood in his stool or black tarry stools.  Other past medical history: History of colon polyps, history of SVT status post ablation, Wright's esophagus, cataracts, stage III chronic renal disease, diabetes, diarrhea, diverticulitis, Dupuytren's contracture, GERD, prostatitis, hyperlipidemia, hypertension, depression, right inguinal hernia, bladder stones, urethral stricture.  Status post TURP, appendectomy, ESWL, herniorrhaphy, vasectomy, ENT and eye surgery.  Non-smoker  Medications: Tylenol, amlodipine, aspirin, citalopram, iron sulfate, HCTZ, lisinopril, metformin, omeprazole, Crestor, Bactrim DS for sinuses, Levitra 20 mg, vitamin D  Allergies: Cipro  Review of systems: As above  Exam: Alert and oriented, dyspneic weight loss notable, normal respirations, neuro grossly intact.  Normal sphincter tone, no rectal mass or impaction, small benign and symmetric prostate, normal seminal vesicles  Assessment: BPH, history of stones, weight loss  Plan: GI work-up scheduled.  See urology back in 12 months with KUB, for urinalysis and JOSIANE.  No PSA unless palpable abnormality

## 2020-07-29 NOTE — NURSING NOTE
Chief Complaint   Patient presents with     Kidney Stone Related     patient is here for yearly KUB      Lennie Luque, CMA

## 2020-07-29 NOTE — LETTER
7/29/2020      RE: Michael Howard  [address] [place] [address]       Michael Howard is a 79-year-old male with a history of BPH and urolithiasis.  His KUB this morning shows no recurrent stones.  Urinalysis is normal but concentrated.  He has had no flank pain, dysuria or hematuria  He has had an 8 pound weight loss in the last year- easily noticed on initial encounter today.  He is eating healthier but is concerned-having GI work-up next month.  He has had no blood in his stool or black tarry stools.  Other past medical history: History of colon polyps, history of SVT status post ablation, Wright's esophagus, cataracts, stage III chronic renal disease, diabetes, diarrhea, diverticulitis, Dupuytren's contracture, GERD, prostatitis, hyperlipidemia, hypertension, depression, right inguinal hernia, bladder stones, urethral stricture.  Status post TURP, appendectomy, ESWL, herniorrhaphy, vasectomy, ENT and eye surgery.  Non-smoker  Medications: Tylenol, amlodipine, aspirin, citalopram, iron sulfate, HCTZ, lisinopril, metformin, omeprazole, Crestor, Bactrim DS for sinuses, Levitra 20 mg, vitamin D  Allergies: Cipro  Review of systems: As above  Exam: Alert and oriented, dyspneic weight loss notable, normal respirations, neuro grossly intact.  Normal sphincter tone, no rectal mass or impaction, small benign and symmetric prostate, normal seminal vesicles  Assessment: BPH, history of stones, weight loss  Plan: GI work-up scheduled.  See urology back in 12 months with KUB, for urinalysis and JOSIANE.  No PSA unless palpable abnormality    Giovanny Ross MD

## 2020-07-29 NOTE — LETTER
7/29/2020       RE: Michael Howard  [address] [place] [address]     Dear Colleague,    Thank you for referring your patient, Michael Howard, to the [facility]. Please see a copy of my visit note below.    Michael Howard is a 79-year-old male with a history of BPH and urolithiasis.  His KUB this morning shows no recurrent stones.  Urinalysis is normal but concentrated.  He has had no flank pain, dysuria or hematuria  He has had an 8 pound weight loss in the last year- easily noticed on initial encounter today.  He is eating healthier but is concerned-having GI work-up next month.  He has had no blood in his stool or black tarry stools.  Other past medical history: History of colon polyps, history of SVT status post ablation, Wright's esophagus, cataracts, stage III chronic renal disease, diabetes, diarrhea, diverticulitis, Dupuytren's contracture, GERD, prostatitis, hyperlipidemia, hypertension, depression, right inguinal hernia, bladder stones, urethral stricture.  Status post TURP, appendectomy, ESWL, herniorrhaphy, vasectomy, ENT and eye surgery.  Non-smoker  Medications: Tylenol, amlodipine, aspirin, citalopram, iron sulfate, HCTZ, lisinopril, metformin, omeprazole, Crestor, Bactrim DS for sinuses, Levitra 20 mg, vitamin D  Allergies: Cipro  Review of systems: As above  Exam: Alert and oriented, dyspneic weight loss notable, normal respirations, neuro grossly intact.  Normal sphincter tone, no rectal mass or impaction, small benign and symmetric prostate, normal seminal vesicles  Assessment: BPH, history of stones, weight loss  Plan: GI work-up scheduled.  See urology back in 12 months with KUB, for urinalysis and JOSIANE.  No PSA unless palpable abnormality    Again, thank you for allowing me to participate in the care of your patient.      Sincerely,    Giovanny Ross MD

## 2020-08-25 ENCOUNTER — TRANSFERRED RECORDS (OUTPATIENT)
Dept: HEALTH INFORMATION MANAGEMENT | Facility: CLINIC | Age: 80
End: 2020-08-25

## 2020-08-25 ENCOUNTER — MEDICAL CORRESPONDENCE (OUTPATIENT)
Dept: HEALTH INFORMATION MANAGEMENT | Facility: CLINIC | Age: 80
End: 2020-08-25

## 2020-08-25 DIAGNOSIS — I49.9 CARDIAC ARRHYTHMIA: Primary | ICD-10-CM

## 2020-08-26 ENCOUNTER — HOSPITAL ENCOUNTER (OUTPATIENT)
Dept: CARDIOLOGY | Facility: CLINIC | Age: 80
Discharge: HOME | End: 2020-08-26
Attending: FAMILY MEDICINE | Admitting: FAMILY MEDICINE
Payer: COMMERCIAL

## 2020-08-26 DIAGNOSIS — I49.9 CARDIAC ARRHYTHMIA: ICD-10-CM

## 2020-08-26 PROCEDURE — 93226 XTRNL ECG REC<48 HR SCAN A/R: CPT

## 2020-08-26 PROCEDURE — 93227 XTRNL ECG REC<48 HR R&I: CPT | Performed by: INTERNAL MEDICINE

## 2020-09-14 ENCOUNTER — OFFICE VISIT (OUTPATIENT)
Dept: CARDIOLOGY | Facility: CLINIC | Age: 80
End: 2020-09-14
Payer: COMMERCIAL

## 2020-09-14 VITALS
DIASTOLIC BLOOD PRESSURE: 61 MMHG | WEIGHT: 158 LBS | SYSTOLIC BLOOD PRESSURE: 109 MMHG | HEIGHT: 69 IN | HEART RATE: 50 BPM | BODY MASS INDEX: 23.4 KG/M2

## 2020-09-14 DIAGNOSIS — R00.1 SINUS BRADYCARDIA: Primary | ICD-10-CM

## 2020-09-14 DIAGNOSIS — I10 BENIGN ESSENTIAL HYPERTENSION: ICD-10-CM

## 2020-09-14 PROCEDURE — 99204 OFFICE O/P NEW MOD 45 MIN: CPT | Performed by: INTERNAL MEDICINE

## 2020-09-14 ASSESSMENT — MIFFLIN-ST. JEOR: SCORE: 1424.12

## 2020-09-14 NOTE — PROGRESS NOTES
"iCARDIOLOGY CONSULT    REASON FOR CONSULT: arrhythmia      PRIMARY CARE PHYSICIAN:  Eric Sutton    HISTORY OF PRESENT ILLNESS:  Mr. Howard is a pleasant 78 y/o gentleman with PMH significant for CKD stage III, diabetes type II, hx of SVT ablation in 1997 who is referred for the evaluation of arrhythmia.  Michael states he has experienced weight loss over the past six months which has led to a GI work up.  He presented for his colonoscopy and he was noted to have \"sinus arrhythmia and sinus pauses\" the details of which are not available.  They proceeded with the colonoscopy.  For further evaluation he underwent a holter monitor which demonstrated an average HR of 50.  He had no evidence for significant pauses or arrhythmias.  Michael overall feels well.  He notes light-headedness when moving from a seated to standing position.  His BP has been lower and his primary MD stopped his hydrochlorothiazide which has helped a little bit.  He denies any exertional chest pain, chest discomfort or shortness of breath.  He denies any exertional light-headedness.  He denies any palpitations or racing heart.  He is otherwise without cardiovascular complaints.      PAST MEDICAL HISTORY:  Past Medical History:   Diagnosis Date     Adenomatous colon polyp      Arrhythmia     Hx SVT/ablation     Wright esophagus      Cataract      Chronic kidney disease, stage III (moderate) (H)      Diabetes mellitus (H)     Type 2     Diarrhea      Diverticulitis      Dupuytren contracture      GERD (gastroesophageal reflux disease)      H/O prostatitis      Hyperlipidemia      Hypertension      Lesion of mouth      Major depression      Right inguinal hernia      Stones, bladder, diverticulum      Urethral stricture        MEDICATIONS:  Current Outpatient Medications   Medication     acetaminophen (TYLENOL) 500 MG tablet     AMLODIPINE BESYLATE PO     ASPIRIN PO     blood glucose monitoring (ONE TOUCH ULTRASOFT) lancets     CITALOPRAM " HYDROBROMIDE PO     ferrous sulfate (IRON) 325 (65 Fe) MG tablet     HYDROCHLOROTHIAZIDE PO     LISINOPRIL PO     METFORMIN HCL PO     omeprazole (PRILOSEC) 40 MG DR capsule     ONETOUCH ULTRA test strip     Rosuvastatin Calcium (CRESTOR PO)     sulfamethoxazole-trimethoprim (BACTRIM DS) 800-160 MG tablet     vardenafil (LEVITRA) 20 MG tablet     vardenafil (LEVITRA) 20 MG tablet     Vitamin D, Cholecalciferol, 1000 UNITS CAPS     No current facility-administered medications for this visit.        ALLERGIES:  Allergies   Allergen Reactions     Ciprofloxacin      Gi bleed       SOCIAL HISTORY:  I have reviewed this patient's social history and updated it with pertinent information if needed. Michael Howard  reports that he has never smoked. He has never used smokeless tobacco. He reports current alcohol use. He reports that he does not use drugs.    FAMILY HISTORY:  I have reviewed this patient's family history and updated it with pertinent information if needed.   No family history on file.    REVIEW OF SYSTEMS:  A complete ROS was obtained and the pertinent positives are outlined in the history of present illness above.  The remainder of systems is negative.     PHYSICAL EXAM:                     Vital Signs with Ranges     0 lbs 0 oz    Constitutional: awake, alert, no distress  Eyes: PERRL, sclera nonicteric  ENT: trachea midline  Respiratory: CTAB  Cardiovascular: RRR no m/r/g, no JVD  GI: nondistended, nontender, bowel sounds present  Lymph/Hematologic: no lymphadenopathy  Skin: dry, no rash  Musculoskeletal: good muscle tone, strength 5/5 in upper and lower extremities  Neurologic: no focal deficits  Neuropsychiatric: appropriate affact    DATA:  Reviewed in Epic    Holter monitor: ave HR 50, range 37-75 bpm.      ASSESSMENT:  1.  Sinus bradycardia:  Not symptomatic.  No evidence for AV block or other arrhythmias noted on holter monitor  2.  Remote hx of SVT:  S/P ablation in 1997  3.  Hypertension:  With  trend toward softer BP recently and orthostatic symptoms.  hydrochlorothiazide recently discontinued  4.  CKD Stage III    RECOMMENDATIONS:  1. Reviewed the results of his holter monitor.  He has sinus bradycardia with no evidence for AV block.  He has a narrow HR range which raises the question of chronotropic incompetence, however, he does not have symptoms of concern.  Avoid AV david blocking agents.    2. Orthostatic symptoms related to soft BP.  hydrochlorothiazide recently discontinued.  He will discuss with his primary MD regarding stopping amlodipine as well.   Will obtain an echocardiogram to exclude underlying structural heart disease.    3. Will review results of echocardiogram.  If no significant findings, he may follow up with cardiology on a PRN basis.      Estelle Morgan MD  Cardiology - [facility]  Pager:  [phone]  September 13, 2020

## 2020-09-14 NOTE — LETTER
"9/14/2020    [facility] [address]  [facility] [address]    RE: Michael Howard       Dear Colleague,    I had the pleasure of seeing Michael Howard in the [facility].    iCARDIOLOGY CONSULT    REASON FOR CONSULT: arrhythmia      PRIMARY CARE PHYSICIAN:  Eric Sutton    HISTORY OF PRESENT ILLNESS:  Mr. Howard is a pleasant 78 y/o gentleman with PMH significant for CKD stage III, diabetes type II, hx of SVT ablation in 1997 who is referred for the evaluation of arrhythmia.  Michael states he has experienced weight loss over the past six months which has led to a GI work up.  He presented for his colonoscopy and he was noted to have \"sinus arrhythmia and sinus pauses\" the details of which are not available.  They proceeded with the colonoscopy.  For further evaluation he underwent a holter monitor which demonstrated an average HR of 50.  He had no evidence for significant pauses or arrhythmias.  Michael overall feels well.  He notes light-headedness when moving from a seated to standing position.  His BP has been lower and his primary MD stopped his hydrochlorothiazide which has helped a little bit.  He denies any exertional chest pain, chest discomfort or shortness of breath.  He denies any exertional light-headedness.  He denies any palpitations or racing heart.  He is otherwise without cardiovascular complaints.      PAST MEDICAL HISTORY:  Past Medical History:   Diagnosis Date     Adenomatous colon polyp      Arrhythmia     Hx SVT/ablation     Wright esophagus      Cataract      Chronic kidney disease, stage III (moderate) (H)      Diabetes mellitus (H)     Type 2     Diarrhea      Diverticulitis      Dupuytren contracture      GERD (gastroesophageal reflux disease)      H/O prostatitis      Hyperlipidemia      Hypertension      Lesion of mouth      Major depression      Right inguinal hernia      Stones, bladder, diverticulum      " Urethral stricture        MEDICATIONS:  Current Outpatient Medications   Medication     acetaminophen (TYLENOL) 500 MG tablet     AMLODIPINE BESYLATE PO     ASPIRIN PO     blood glucose monitoring (ONE TOUCH ULTRASOFT) lancets     CITALOPRAM HYDROBROMIDE PO     ferrous sulfate (IRON) 325 (65 Fe) MG tablet     HYDROCHLOROTHIAZIDE PO     LISINOPRIL PO     METFORMIN HCL PO     omeprazole (PRILOSEC) 40 MG DR capsule     ONETOUCH ULTRA test strip     Rosuvastatin Calcium (CRESTOR PO)     sulfamethoxazole-trimethoprim (BACTRIM DS) 800-160 MG tablet     vardenafil (LEVITRA) 20 MG tablet     vardenafil (LEVITRA) 20 MG tablet     Vitamin D, Cholecalciferol, 1000 UNITS CAPS     No current facility-administered medications for this visit.        ALLERGIES:  Allergies   Allergen Reactions     Ciprofloxacin      Gi bleed       SOCIAL HISTORY:  I have reviewed this patient's social history and updated it with pertinent information if needed. Michael Howard  reports that he has never smoked. He has never used smokeless tobacco. He reports current alcohol use. He reports that he does not use drugs.    FAMILY HISTORY:  I have reviewed this patient's family history and updated it with pertinent information if needed.   No family history on file.    REVIEW OF SYSTEMS:  A complete ROS was obtained and the pertinent positives are outlined in the history of present illness above.  The remainder of systems is negative.     PHYSICAL EXAM:                     Vital Signs with Ranges     0 lbs 0 oz    Constitutional: awake, alert, no distress  Eyes: PERRL, sclera nonicteric  ENT: trachea midline  Respiratory: CTAB  Cardiovascular: RRR no m/r/g, no JVD  GI: nondistended, nontender, bowel sounds present  Lymph/Hematologic: no lymphadenopathy  Skin: dry, no rash  Musculoskeletal: good muscle tone, strength 5/5 in upper and lower extremities  Neurologic: no focal deficits  Neuropsychiatric: appropriate affact    DATA:  Reviewed in  Epic    Holter monitor: ave HR 50, range 37-75 bpm.      ASSESSMENT:  1.  Sinus bradycardia:  Not symptomatic.  No evidence for AV block or other arrhythmias noted on holter monitor  2.  Remote hx of SVT:  S/P ablation in 1997  3.  Hypertension:  With trend toward softer BP recently and orthostatic symptoms.  hydrochlorothiazide recently discontinued  4.  CKD Stage III    RECOMMENDATIONS:  1. Reviewed the results of his holter monitor.  He has sinus bradycardia with no evidence for AV block.  He has a narrow HR range which raises the question of chronotropic incompetence, however, he does not have symptoms of concern.  Avoid AV david blocking agents.    2. Orthostatic symptoms related to soft BP.  hydrochlorothiazide recently discontinued.  He will discuss with his primary MD regarding stopping amlodipine as well.   Will obtain an echocardiogram to exclude underlying structural heart disease.    3. Will review results of echocardiogram.  If no significant findings, he may follow up with cardiology on a PRN basis.      Estelle Morgan MD  Cardiology - [facility]  Pager:  [phone]  September 13, 2020    Thank you for allowing me to participate in the care of your patient.    Sincerely,     Estelle Morgan MD     [facility]

## 2020-09-14 NOTE — PATIENT INSTRUCTIONS
-Schedule echocardiogram.  We will call you with the results.  -Follow up with cardiology as needed

## 2020-09-14 NOTE — LETTER
"9/14/2020    [facility] [address]  [facility] [address]    RE: Michael Howard       Dear Colleague,    I had the pleasure of seeing Michael Howard in the [facility].    iCARDIOLOGY CONSULT    REASON FOR CONSULT: arrhythmia      PRIMARY CARE PHYSICIAN:  Eric Sutton    HISTORY OF PRESENT ILLNESS:  Mr. Howard is a pleasant 78 y/o gentleman with PMH significant for CKD stage III, diabetes type II, hx of SVT ablation in 1997 who is referred for the evaluation of arrhythmia.  Michael states he has experienced weight loss over the past six months which has led to a GI work up.  He presented for his colonoscopy and he was noted to have \"sinus arrhythmia and sinus pauses\" the details of which are not available.  They proceeded with the colonoscopy.  For further evaluation he underwent a holter monitor which demonstrated an average HR of 50.  He had no evidence for significant pauses or arrhythmias.  Michael overall feels well.  He notes light-headedness when moving from a seated to standing position.  His BP has been lower and his primary MD stopped his hydrochlorothiazide which has helped a little bit.  He denies any exertional chest pain, chest discomfort or shortness of breath.  He denies any exertional light-headedness.  He denies any palpitations or racing heart.  He is otherwise without cardiovascular complaints.      PAST MEDICAL HISTORY:  Past Medical History:   Diagnosis Date     Adenomatous colon polyp      Arrhythmia     Hx SVT/ablation     Wright esophagus      Cataract      Chronic kidney disease, stage III (moderate) (H)      Diabetes mellitus (H)     Type 2     Diarrhea      Diverticulitis      Dupuytren contracture      GERD (gastroesophageal reflux disease)      H/O prostatitis      Hyperlipidemia      Hypertension      Lesion of mouth      Major depression      Right inguinal hernia      Stones, bladder, diverticulum      " Urethral stricture        MEDICATIONS:  Current Outpatient Medications   Medication     acetaminophen (TYLENOL) 500 MG tablet     AMLODIPINE BESYLATE PO     ASPIRIN PO     blood glucose monitoring (ONE TOUCH ULTRASOFT) lancets     CITALOPRAM HYDROBROMIDE PO     ferrous sulfate (IRON) 325 (65 Fe) MG tablet     HYDROCHLOROTHIAZIDE PO     LISINOPRIL PO     METFORMIN HCL PO     omeprazole (PRILOSEC) 40 MG DR capsule     ONETOUCH ULTRA test strip     Rosuvastatin Calcium (CRESTOR PO)     sulfamethoxazole-trimethoprim (BACTRIM DS) 800-160 MG tablet     vardenafil (LEVITRA) 20 MG tablet     vardenafil (LEVITRA) 20 MG tablet     Vitamin D, Cholecalciferol, 1000 UNITS CAPS     No current facility-administered medications for this visit.        ALLERGIES:  Allergies   Allergen Reactions     Ciprofloxacin      Gi bleed       SOCIAL HISTORY:  I have reviewed this patient's social history and updated it with pertinent information if needed. Michael Howard  reports that he has never smoked. He has never used smokeless tobacco. He reports current alcohol use. He reports that he does not use drugs.    FAMILY HISTORY:  I have reviewed this patient's family history and updated it with pertinent information if needed.   No family history on file.    REVIEW OF SYSTEMS:  A complete ROS was obtained and the pertinent positives are outlined in the history of present illness above.  The remainder of systems is negative.     PHYSICAL EXAM:                     Vital Signs with Ranges     0 lbs 0 oz    Constitutional: awake, alert, no distress  Eyes: PERRL, sclera nonicteric  ENT: trachea midline  Respiratory: CTAB  Cardiovascular: RRR no m/r/g, no JVD  GI: nondistended, nontender, bowel sounds present  Lymph/Hematologic: no lymphadenopathy  Skin: dry, no rash  Musculoskeletal: good muscle tone, strength 5/5 in upper and lower extremities  Neurologic: no focal deficits  Neuropsychiatric: appropriate affact    DATA:  Reviewed in  Epic    Holter monitor: ave HR 50, range 37-75 bpm.      ASSESSMENT:  1.  Sinus bradycardia:  Not symptomatic.  No evidence for AV block or other arrhythmias noted on holter monitor  2.  Remote hx of SVT:  S/P ablation in 1997  3.  Hypertension:  With trend toward softer BP recently and orthostatic symptoms.  hydrochlorothiazide recently discontinued  4.  CKD Stage III    RECOMMENDATIONS:  1. Reviewed the results of his holter monitor.  He has sinus bradycardia with no evidence for AV block.  He has a narrow HR range which raises the question of chronotropic incompetence, however, he does not have symptoms of concern.  Avoid AV david blocking agents.    2. Orthostatic symptoms related to soft BP.  hydrochlorothiazide recently discontinued.  He will discuss with his primary MD regarding stopping amlodipine as well.   Will obtain an echocardiogram to exclude underlying structural heart disease.    3. Will review results of echocardiogram.  If no significant findings, he may follow up with cardiology on a PRN basis.      Estelle Morgan MD  Cardiology - [facility]  Pager:  [phone]  September 13, 2020    Thank you for allowing me to participate in the care of your patient.      Sincerely,     Estelle Morgan MD     [facility]    cc:   [facility]  [address]  [place] [address]

## 2020-09-22 ENCOUNTER — TELEPHONE (OUTPATIENT)
Dept: CARDIOLOGY | Facility: CLINIC | Age: 80
End: 2020-09-22

## 2020-09-23 ENCOUNTER — HOSPITAL ENCOUNTER (OUTPATIENT)
Dept: CARDIOLOGY | Facility: CLINIC | Age: 80
Discharge: HOME | End: 2020-09-23
Attending: INTERNAL MEDICINE | Admitting: INTERNAL MEDICINE
Payer: COMMERCIAL

## 2020-09-23 DIAGNOSIS — I10 BENIGN ESSENTIAL HYPERTENSION: ICD-10-CM

## 2020-09-23 PROCEDURE — 93306 TTE W/DOPPLER COMPLETE: CPT

## 2020-09-23 PROCEDURE — 93306 TTE W/DOPPLER COMPLETE: CPT | Mod: 26 | Performed by: INTERNAL MEDICINE

## 2020-09-25 ENCOUNTER — TELEPHONE (OUTPATIENT)
Dept: CARDIOLOGY | Facility: CLINIC | Age: 80
End: 2020-09-25

## 2020-09-25 NOTE — TELEPHONE ENCOUNTER
Estelle Morgan MD  [facility] 4               Echocardiogram reviewed; demonstrates normal heart function, normal valve function.  Overall, looks very good.       Estelle Morgan MD        Last OV 9/14/20 with Dr. Morgan:    RECOMMENDATIONS:  1. Reviewed the results of his holter monitor.  He has sinus bradycardia with no evidence for AV block.  He has a narrow HR range which raises the question of chronotropic incompetence, however, he does not have symptoms of concern.  Avoid AV david blocking agents.    2. Orthostatic symptoms related to soft BP.  hydrochlorothiazide recently discontinued.  He will discuss with his primary MD regarding stopping amlodipine as well.   Will obtain an echocardiogram to exclude underlying structural heart disease.    3. Will review results of echocardiogram.  If no significant findings, he may follow up with cardiology on a PRN basis.       Estelle Morgan MD  Cardiology - [facility]  Pager:  [phone]  September 13, 2020        Spoke with patient to review results and Dr. Morgan's comments. Patient verbalized understanding and agreed with plan of care. No further questions. Patient can follow up with cardiology on an as needed basis.

## 2020-12-03 ENCOUNTER — TELEPHONE (OUTPATIENT)
Dept: UROLOGY | Facility: CLINIC | Age: 80
End: 2020-12-03

## 2020-12-03 NOTE — TELEPHONE ENCOUNTER
PRIOR AUTHORIZATION Not Allowed, per Insurance; Medication Exclusion from Patients benefit plan.    Medication: vardenafil (LEVITRA) 20 MG tablet-PA NOT NEEDED/MEDICATION EXCLSUON     Denial Date: 12/3/2020    Denial Rational: Medication Exclusion from Patients benefit plan.   Prior Authorization Not Allowed, per Insurance; Medication Exclusion from Patients benefit plan. Called insurance and Rep Angela stated that Medication is Excluded and Excluded medication PA is Not Allowed to initiate due to Exclusion.             Appeal Information:     Benefit Coverage Review [phone]

## 2021-03-20 ENCOUNTER — HEALTH MAINTENANCE LETTER (OUTPATIENT)
Age: 81
End: 2021-03-20

## 2021-04-17 ENCOUNTER — HOSPITAL ENCOUNTER (EMERGENCY)
Facility: CLINIC | Age: 81
Discharge: HOME | End: 2021-04-17
Attending: EMERGENCY MEDICINE | Admitting: EMERGENCY MEDICINE
Payer: COMMERCIAL

## 2021-04-17 ENCOUNTER — APPOINTMENT (OUTPATIENT)
Dept: GENERAL RADIOLOGY | Facility: CLINIC | Age: 81
End: 2021-04-17
Attending: EMERGENCY MEDICINE
Payer: COMMERCIAL

## 2021-04-17 VITALS
HEIGHT: 68 IN | DIASTOLIC BLOOD PRESSURE: 46 MMHG | WEIGHT: 152 LBS | TEMPERATURE: 98.3 F | BODY MASS INDEX: 23.04 KG/M2 | SYSTOLIC BLOOD PRESSURE: 117 MMHG | RESPIRATION RATE: 18 BRPM | HEART RATE: 84 BPM | OXYGEN SATURATION: 97 %

## 2021-04-17 DIAGNOSIS — M79.672 LEFT FOOT PAIN: ICD-10-CM

## 2021-04-17 LAB
ANION GAP SERPL CALCULATED.3IONS-SCNC: 5 MMOL/L (ref 3–14)
BASOPHILS # BLD AUTO: 0 10E9/L (ref 0–0.2)
BASOPHILS NFR BLD AUTO: 0.3 %
BUN SERPL-MCNC: 33 MG/DL (ref 7–30)
CALCIUM SERPL-MCNC: 9.4 MG/DL (ref 8.5–10.1)
CHLORIDE SERPL-SCNC: 98 MMOL/L (ref 94–109)
CO2 SERPL-SCNC: 28 MMOL/L (ref 20–32)
CREAT SERPL-MCNC: 1.52 MG/DL (ref 0.66–1.25)
CRP SERPL-MCNC: 142 MG/L (ref 0–8)
DIFFERENTIAL METHOD BLD: ABNORMAL
EOSINOPHIL # BLD AUTO: 0 10E9/L (ref 0–0.7)
EOSINOPHIL NFR BLD AUTO: 0.3 %
ERYTHROCYTE [DISTWIDTH] IN BLOOD BY AUTOMATED COUNT: 12.5 % (ref 10–15)
ERYTHROCYTE [SEDIMENTATION RATE] IN BLOOD BY WESTERGREN METHOD: 98 MM/H (ref 0–20)
GFR SERPL CREATININE-BSD FRML MDRD: 43 ML/MIN/{1.73_M2}
GLUCOSE SERPL-MCNC: 266 MG/DL (ref 70–99)
HCT VFR BLD AUTO: 33.8 % (ref 40–53)
HGB BLD-MCNC: 11.3 G/DL (ref 13.3–17.7)
IMM GRANULOCYTES # BLD: 0.1 10E9/L (ref 0–0.4)
IMM GRANULOCYTES NFR BLD: 0.9 %
LYMPHOCYTES # BLD AUTO: 0.7 10E9/L (ref 0.8–5.3)
LYMPHOCYTES NFR BLD AUTO: 6 %
MCH RBC QN AUTO: 30.2 PG (ref 26.5–33)
MCHC RBC AUTO-ENTMCNC: 33.4 G/DL (ref 31.5–36.5)
MCV RBC AUTO: 90 FL (ref 78–100)
MONOCYTES # BLD AUTO: 1.2 10E9/L (ref 0–1.3)
MONOCYTES NFR BLD AUTO: 9.7 %
NEUTROPHILS # BLD AUTO: 9.9 10E9/L (ref 1.6–8.3)
NEUTROPHILS NFR BLD AUTO: 82.8 %
NRBC # BLD AUTO: 0 10*3/UL
NRBC BLD AUTO-RTO: 0 /100
PLATELET # BLD AUTO: 463 10E9/L (ref 150–450)
POTASSIUM SERPL-SCNC: 3.6 MMOL/L (ref 3.4–5.3)
RBC # BLD AUTO: 3.74 10E12/L (ref 4.4–5.9)
SODIUM SERPL-SCNC: 131 MMOL/L (ref 133–144)
URATE SERPL-MCNC: 4.2 MG/DL (ref 3.5–7.2)
WBC # BLD AUTO: 11.9 10E9/L (ref 4–11)

## 2021-04-17 PROCEDURE — 80048 BASIC METABOLIC PNL TOTAL CA: CPT | Performed by: EMERGENCY MEDICINE

## 2021-04-17 PROCEDURE — 86140 C-REACTIVE PROTEIN: CPT | Performed by: EMERGENCY MEDICINE

## 2021-04-17 PROCEDURE — 85652 RBC SED RATE AUTOMATED: CPT | Performed by: EMERGENCY MEDICINE

## 2021-04-17 PROCEDURE — 73630 X-RAY EXAM OF FOOT: CPT | Mod: LT

## 2021-04-17 PROCEDURE — 99285 EMERGENCY DEPT VISIT HI MDM: CPT

## 2021-04-17 PROCEDURE — 85025 COMPLETE CBC W/AUTO DIFF WBC: CPT | Performed by: EMERGENCY MEDICINE

## 2021-04-17 PROCEDURE — 84550 ASSAY OF BLOOD/URIC ACID: CPT | Performed by: EMERGENCY MEDICINE

## 2021-04-17 PROCEDURE — 250N000013 HC RX MED GY IP 250 OP 250 PS 637: Performed by: EMERGENCY MEDICINE

## 2021-04-17 RX ORDER — PREDNISONE 20 MG/1
40 TABLET ORAL DAILY
Qty: 14 TABLET | Refills: 0 | Status: SHIPPED | OUTPATIENT
Start: 2021-04-17 | End: 2021-04-24

## 2021-04-17 RX ORDER — CEPHALEXIN 500 MG/1
500 CAPSULE ORAL 4 TIMES DAILY
Qty: 28 CAPSULE | Refills: 0 | Status: SHIPPED | OUTPATIENT
Start: 2021-04-17 | End: 2021-04-24

## 2021-04-17 RX ORDER — OXYCODONE HYDROCHLORIDE 5 MG/1
10 TABLET ORAL ONCE
Status: COMPLETED | OUTPATIENT
Start: 2021-04-17 | End: 2021-04-17

## 2021-04-17 RX ORDER — OXYCODONE HYDROCHLORIDE 5 MG/1
5 TABLET ORAL EVERY 6 HOURS PRN
Qty: 8 TABLET | Refills: 0 | Status: SHIPPED | OUTPATIENT
Start: 2021-04-17 | End: 2021-11-02

## 2021-04-17 RX ADMIN — OXYCODONE HYDROCHLORIDE 10 MG: 5 TABLET ORAL at 19:58

## 2021-04-17 ASSESSMENT — ENCOUNTER SYMPTOMS
VOMITING: 0
FEVER: 0
COUGH: 0

## 2021-04-17 ASSESSMENT — MIFFLIN-ST. JEOR: SCORE: 1383.97

## 2021-04-17 NOTE — ED PROVIDER NOTES
History   Chief Complaint:  Foot Pain       The history is provided by the patient.      Michael Howard is a 80 year old male with history of diabetes, hypertension and hyperlipidemia who presents with left foot pain starting today. The patient states that he has had intermittent pains in his hands and feet over the past week, but awoke with this left foot pain this morning. He states that the pain is localized more over the center of his foot. He notes difficulty walking due to this. The patient states that the foot feels warm as well. He denies any injuries to the foot. He denies any fever, cough or vomiting. He has no history of gout. He notes no recent alcohol or heavy meat intake recently.       Review of Systems   Constitutional: Negative for fever.   Respiratory: Negative for cough.    Gastrointestinal: Negative for vomiting.   Musculoskeletal: Positive for gait problem.        (+) left foot pain   All other systems reviewed and are negative.    Allergies:  Ciprofloxacin    Medications:  Amlodipine besylate  Aspirin  Citalopram hydrobromide  Ferrous sulfate  Hydrochlorothiazide  Lisinopril  Metformin  Omeprazole  Crestor  Levitra    Past Medical History:    Adenomatous colon polyp   Arrhythmia   Wright esophagus   Cataract   Chronic kidney disease, stage III   Diabetes mellitus   Diverticulitis   Dupuytren contracture   GERD   Prostatitis   Hyperlipidemia   Hypertension    Major depression   Right inguinal hernia   Stones, bladder, diverticulum   Urethral stricture     Past Surgical History:    Appendectomy  Coronary ablation for SVT  Colonoscopy  Cystoscopy  Cystoscopy, transurethral resection prostate, combined  Tonsillectomy  ESWL  Retinal detachment repair  Left inguinal hernia repair  Laparoscopic herniorrhaphy inguinal, right   Phacoemulsification clear cornea with standard intraocular lens implant x2  vasectomy     Social History:  The patient presents to the emergency department alone.  The  "patient is .     Physical Exam     Patient Vitals for the past 24 hrs:   BP Temp Temp src Pulse Resp SpO2 Height Weight   21 1822 117/46 98.3  F (36.8  C) Oral 84 18 97 % 1.727 m (5' 8\") 68.9 kg (152 lb)       Physical Exam  VS: Reviewed per above  HENT: normal speech  EYES: sclera anicteric  CV: Rate as noted, regular rhythm.   RESP: Effort normal.  NEURO: Alert, moving all extremities, sensation intact to light touch in the distal left lower extremity.  MSK: No deformity of the extremities.  Tenderness over the left dorsal foot.  There is very subtle erythema and warmth over this area.  Intact left DP pulse.  SKIN: Warm and dry    Emergency Department Course     Imaging:  XR Foot left G/E 3 Views:  Normal joint spaces and alignment. No fracture. Tiny calcaneus enthesophytes.   As per radiology.     Laboratory:  CBC: WBC: 11.9 (high), HB.3 (low), PLT: 463 (high)     BMP: Sodium: 131 (low), Urea Nitrogen: 33 (high), Glucose: 266 (high), Creatinine: 1.52 (high), GFR: 43 (low), o/w WNL     CRP Inflammation: 142.0 (high)     Erythrocyte sedimentation rate auto: 98 (high)     Uric Acid: 4.2    Emergency Department Course:    Reviewed:  : I reviewed the patient's nursing notes, vitals and past medical records.    Assessments:  : I assessed the patient and performed a physical exam at this time.  : I reassessed the patient and informed them of their workup thus far. Bedside ultrasound used at this time. Nothing visualized. At this point I feel that the patient is safe for discharge, and the patient agrees.     Interventions:   Oxycodone 10mg PO    Disposition:  The patient was discharged to home.     Impression & Plan     Medical Decision Making:  Patient presents to the ER for evaluation of left midfoot pain.  On arrival vital signs are reassuring.  On exam he has tenderness over the dorsal left midfoot with some very subtle erythema and warmth.  No evidence of neurovascular compromise " in the distal left lower extremity.  X-ray of the foot does not show fracture/dislocation.  Bedside ultrasound did not reveal evidence of fluid collection amenable to aspirate/send for studies.  Patient does have elevated inflammatory markers.  Uric acid is within normal range although might not be elevated an acute gout flare.  No fevers here or evidence of severe skin or soft tissue infection but plan to treat empirically with Keflex as well as prednisone for possible gout flare.  Patient was also given some pain pills due to severe pain.  I encouraged close primary care follow-up.  Return precautions were discussed prior to discharge.    Diagnosis:    ICD-10-CM    1. Left foot pain  M79.672        Discharge Medications:  New Prescriptions    CEPHALEXIN (KEFLEX) 500 MG CAPSULE    Take 1 capsule (500 mg) by mouth 4 times daily for 7 days    OXYCODONE (ROXICODONE) 5 MG TABLET    Take 1 tablet (5 mg) by mouth every 6 hours as needed for severe pain    PREDNISONE (DELTASONE) 20 MG TABLET    Take 2 tablets (40 mg) by mouth daily for 7 days       Scribe Disclosure:  I, Nelson Davis, am serving as a scribe at 6:33 PM on 4/17/2021 to document services personally performed by Jeromy Johnson MD based on my observations and the provider's statements to me.          Jeromy Johnson MD  04/17/21 1209

## 2021-04-17 NOTE — ED TRIAGE NOTES
"Pt here from home by spouse for L foot pain. Pt states he has been experiencing aches in his hands and feet. He states the pain comes and goes. Explains he had some swelling in his R hand yesterday that has resolved. Today he developed L foot pain that \" hurts in the center of the foot\". Unable to bear weight on the foot. Hx CKD. Denies fevers. Denies hx of gout. Denies trauma.   "

## 2021-04-18 NOTE — DISCHARGE INSTRUCTIONS
The x-ray did not show any broken bones.  You will be treated for possible gout or early infection of the skin of the foot.  Return for high fevers or worsening swelling/pain despite taking the antibiotics and steroids that you have been prescribed.  Follow-up in primary care clinic for reassessment after the weekend.

## 2021-07-26 ENCOUNTER — HOSPITAL ENCOUNTER (OUTPATIENT)
Dept: GENERAL RADIOLOGY | Facility: CLINIC | Age: 81
Discharge: HOME | End: 2021-07-26
Attending: UROLOGY | Admitting: UROLOGY
Payer: COMMERCIAL

## 2021-07-26 DIAGNOSIS — N20.0 KIDNEY STONES: ICD-10-CM

## 2021-07-26 PROCEDURE — 74019 RADEX ABDOMEN 2 VIEWS: CPT

## 2021-08-17 ENCOUNTER — VIRTUAL VISIT (OUTPATIENT)
Dept: UROLOGY | Facility: CLINIC | Age: 81
End: 2021-08-17
Payer: COMMERCIAL

## 2021-08-17 VITALS — BODY MASS INDEX: 23.49 KG/M2 | HEIGHT: 68 IN | WEIGHT: 155 LBS

## 2021-08-17 DIAGNOSIS — N20.0 NEPHROLITHIASIS: Primary | ICD-10-CM

## 2021-08-17 DIAGNOSIS — N52.01 ERECTILE DYSFUNCTION DUE TO ARTERIAL INSUFFICIENCY: ICD-10-CM

## 2021-08-17 PROCEDURE — 99213 OFFICE O/P EST LOW 20 MIN: CPT | Mod: TEL | Performed by: PHYSICIAN ASSISTANT

## 2021-08-17 RX ORDER — VARDENAFIL HYDROCHLORIDE 20 MG/1
20 TABLET ORAL DAILY PRN
Qty: 10 TABLET | Refills: 6 | Status: SHIPPED | OUTPATIENT
Start: 2021-08-17 | End: 2021-11-02 | Stop reason: ALTCHOICE

## 2021-08-17 RX ORDER — FLUTICASONE PROPIONATE 50 MCG
SPRAY, SUSPENSION (ML) NASAL
COMMUNITY
Start: 2020-12-15 | End: 2021-11-02

## 2021-08-17 RX ORDER — FERROUS GLUCONATE 324(38)MG
324 TABLET ORAL 2 TIMES DAILY
COMMUNITY
Start: 2021-06-09 | End: 2021-11-02

## 2021-08-17 ASSESSMENT — ENCOUNTER SYMPTOMS
VOMITING: 0
NAUSEA: 0
DYSURIA: 0
CHILLS: 0
FLANK PAIN: 0
SHORTNESS OF BREATH: 0
BACK PAIN: 0
FEVER: 0
HEMATURIA: 0

## 2021-08-17 ASSESSMENT — MIFFLIN-ST. JEOR: SCORE: 1401.55

## 2021-08-17 ASSESSMENT — PAIN SCALES - GENERAL: PAINLEVEL: NO PAIN (0)

## 2021-08-17 NOTE — PATIENT INSTRUCTIONS
-Recommendation follow-up in 1 year with urinalysis, KUB, and office visit.    We discussed cheaper options for erectile dysfunction medication including switching to generic Viagra or Cialis.  Patient would like to continue Levitra.  We discussed Good Rx. 20 mg Levitra 10 tablets with 6 refills.  Possible side effects include flushing, backache, headache, or priapism.  This medication should not be used with nitroglycerin.    -Follow-up in 1 year for Levitra or other erectile dysfunction medication refill.

## 2021-08-17 NOTE — PROGRESS NOTES
Michael is a 80 year old who is being evaluated via a billable telephone visit.      What phone number would you like to be contacted at? [phone]  How would you like to obtain your AVS? Martha    CHIEF COMPLAINT/REASON FOR VISIT   Nephrolithiasis follow up  Med refill    HISTORY OF PRESENT ILLNESS   Mr. Howard is a very pleasant 80-year-old gentleman, who presents today for follow-up regarding nephrolithiasis.  He also has a history of BPH.  He previously followed with Dr. Ross.  Last seen on 07/29/2020.  Also has a history of TURP.  Last prostate exam showed no concerning findings.    Since last time we saw him, patient denies any recurrent episodes of nephrolithiasis.  He has not had any urinary tract infections, hematuria, dysuria, urgency, or frequency of urination.  He feels that he is emptying his bladder well.    Patient does have CKD stage III.    He is wondering also about a refill of his Levitra.  He is tolerating this.  He notes occasional stuffy nose and an occasional headache that is not severe.      The following portions of the patient's history were reviewed and updated as appropriate: allergies, current medications, past family history, past medical history, past social history, past surgical history, and problem list.     REVIEW OF SYSTEMS   Review of Systems   Constitutional: Negative for chills and fever.   Respiratory: Negative for shortness of breath.    Cardiovascular: Negative for chest pain.   Gastrointestinal: Negative for nausea and vomiting.   Genitourinary: Negative for dysuria, flank pain, hematuria and urgency.   Musculoskeletal: Negative for back pain.      Per HPI.     Patient Active Problem List   Diagnosis     Chronic kidney disease, stage III (moderate)     BPH (benign prostatic hyperplasia)     ACP (advance care planning)     Diabetes mellitus, type 2 (H)      Past Medical History:   Diagnosis Date     Adenomatous colon polyp      Arrhythmia     Hx SVT/ablation      Wright esophagus      Cataract      Chronic kidney disease, stage III (moderate)      Diabetes mellitus (H)     Type 2     Diarrhea      Diverticulitis      Dupuytren contracture      GERD (gastroesophageal reflux disease)      H/O prostatitis      Hyperlipidemia      Hypertension      Lesion of mouth      Major depression      Right inguinal hernia      Stones, bladder, diverticulum      Urethral stricture         Objective      Physical Exam   alert and no distress  PSYCH: Alert and oriented times 3; coherent speech, normal   rate and volume, able to articulate logical thoughts, able   to abstract reason, no tangential thoughts, no hallucinations   or delusions  His affect is normal and pleasant  RESP: No cough, no audible wheezing, able to talk in full sentences  Remainder of exam unable to be completed due to telephone visits    LABORATORY     Recent Labs   Lab Test 07/29/20  1030 09/14/18  1804   COLOR Yellow Yellow   APPEARANCE Clear Slightly Cloudy   URINEGLC Negative Negative   URINEBILI Negative Negative   URINEKETONE Negative Negative   SG 1.025 1.015   UBLD Trace* Trace*   URINEPH 5.5 5.5   PROTEIN 30* 100*   UROBILINOGEN 0.2  --    NITRITE Negative Negative   LEUKEST Negative Large*   RBCU  --  5*   WBCU  --  100*     IMAGING     I personally reviewed the images.     XR KUB [[id number]]    Result Date: 7/26/2021  XR KUB 7/26/2021 10:53 AM HISTORY: history of stones; Kidney stones COMPARISON: 7/29/2020     IMPRESSION: No definite urinary tract calculi. Bowel gas pattern is nonobstructed. DONTA GARCIA MD   SYSTEM ID:  [id number]     Assessment & Plan    1. Nephrolithiasis    2. Erectile dysfunction due to arterial insufficiency      I the pleasure today meeting with Mr. Howard to discuss his nephrolithiasis.  I am pleased to inform him there are no definitive stones noted on his KUB.    He is also urinating well.    -Recommendation follow-up in 1 year with urinalysis, KUB, and office visit.    Patient is  doing well on 20 mg of Levitra.  We discussed cheaper options for erectile dysfunction medication including switching to generic Viagra or Cialis.  Patient would like to continue Levitra.  We discussed good Rx.  His medication was refilled.    -Follow-up in 1 year for Levitra or other erectile dysfunction medication refill.  Phone call duration 13 minutes    Signed by:       Jacey Haley PA-C 8/17/2021 5:01 PM

## 2021-08-17 NOTE — LETTER
8/17/2021       RE: Michael Howard  [address] [place] [address]     Dear Colleague,    Thank you for referring your patient, Michael Howard, to the [facility] [place] at [facility]. Please see a copy of my visit note below.    Michael is a 80 year old who is being evaluated via a billable telephone visit.      What phone number would you like to be contacted at? [phone]  How would you like to obtain your AVS? [place]    CHIEF COMPLAINT/REASON FOR VISIT   Nephrolithiasis follow up  Med refill    HISTORY OF PRESENT ILLNESS   Mr. Howard is a very pleasant 80-year-old gentleman, who presents today for follow-up regarding nephrolithiasis.  He also has a history of BPH.  He previously followed with Dr. Ross.  Last seen on 07/29/2020.  Also has a history of TURP.  Last prostate exam showed no concerning findings.    Since last time we saw him, patient denies any recurrent episodes of nephrolithiasis.  He has not had any urinary tract infections, hematuria, dysuria, urgency, or frequency of urination.  He feels that he is emptying his bladder well.    Patient does have CKD stage III.    He is wondering also about a refill of his Levitra.  He is tolerating this.  He notes occasional stuffy nose and an occasional headache that is not severe.      The following portions of the patient's history were reviewed and updated as appropriate: allergies, current medications, past family history, past medical history, past social history, past surgical history, and problem list.     REVIEW OF SYSTEMS   Review of Systems   Constitutional: Negative for chills and fever.   Respiratory: Negative for shortness of breath.    Cardiovascular: Negative for chest pain.   Gastrointestinal: Negative for nausea and vomiting.   Genitourinary: Negative for dysuria, flank pain, hematuria and urgency.   Musculoskeletal: Negative for back pain.      Per HPI.      Patient Active Problem List   Diagnosis     Chronic kidney disease, stage III (moderate)     BPH (benign prostatic hyperplasia)     ACP (advance care planning)     Diabetes mellitus, type 2 (H)      Past Medical History:   Diagnosis Date     Adenomatous colon polyp      Arrhythmia     Hx SVT/ablation     Wright esophagus      Cataract      Chronic kidney disease, stage III (moderate)      Diabetes mellitus (H)     Type 2     Diarrhea      Diverticulitis      Dupuytren contracture      GERD (gastroesophageal reflux disease)      H/O prostatitis      Hyperlipidemia      Hypertension      Lesion of mouth      Major depression      Right inguinal hernia      Stones, bladder, diverticulum      Urethral stricture         Objective      Physical Exam   alert and no distress  PSYCH: Alert and oriented times 3; coherent speech, normal   rate and volume, able to articulate logical thoughts, able   to abstract reason, no tangential thoughts, no hallucinations   or delusions  His affect is normal and pleasant  RESP: No cough, no audible wheezing, able to talk in full sentences  Remainder of exam unable to be completed due to telephone visits    LABORATORY     Recent Labs   Lab Test 07/29/20  1030 09/14/18  1804   COLOR Yellow Yellow   APPEARANCE Clear Slightly Cloudy   URINEGLC Negative Negative   URINEBILI Negative Negative   URINEKETONE Negative Negative   SG 1.025 1.015   UBLD Trace* Trace*   URINEPH 5.5 5.5   PROTEIN 30* 100*   UROBILINOGEN 0.2  --    NITRITE Negative Negative   LEUKEST Negative Large*   RBCU  --  5*   WBCU  --  100*     IMAGING     I personally reviewed the images.     XR KUB [[id number]]    Result Date: 7/26/2021  XR KUB 7/26/2021 10:53 AM HISTORY: history of stones; Kidney stones COMPARISON: 7/29/2020     IMPRESSION: No definite urinary tract calculi. Bowel gas pattern is nonobstructed. DONTA GARCIA MD   SYSTEM ID:  [id number]     Assessment & Plan    1. Nephrolithiasis    2. Erectile dysfunction due  to arterial insufficiency      I the pleasure today meeting with Mr. Howard to discuss his nephrolithiasis.  I am pleased to inform him there are no definitive stones noted on his KUB.    He is also urinating well.    -Recommendation follow-up in 1 year with urinalysis, KUB, and office visit.    Patient is doing well on 20 mg of Levitra.  We discussed cheaper options for erectile dysfunction medication including switching to generic Viagra or Cialis.  Patient would like to continue Levitra.  We discussed good Rx.  His medication was refilled.    -Follow-up in 1 year for Levitra or other erectile dysfunction medication refill.  Phone call duration 13 minutes    Signed by:       Jacey Haley PA-C 8/17/2021 5:01 PM

## 2021-10-24 ENCOUNTER — HEALTH MAINTENANCE LETTER (OUTPATIENT)
Age: 81
End: 2021-10-24

## 2021-11-02 ENCOUNTER — OFFICE VISIT (OUTPATIENT)
Dept: PHARMACY | Facility: PHYSICIAN GROUP | Age: 81
End: 2021-11-02

## 2021-11-02 VITALS
DIASTOLIC BLOOD PRESSURE: 70 MMHG | WEIGHT: 155.35 LBS | OXYGEN SATURATION: 98 % | BODY MASS INDEX: 23.45 KG/M2 | HEART RATE: 68 BPM | SYSTOLIC BLOOD PRESSURE: 126 MMHG

## 2021-11-02 DIAGNOSIS — Z78.9 TAKES DIETARY SUPPLEMENTS: ICD-10-CM

## 2021-11-02 DIAGNOSIS — E11.9 CONTROLLED TYPE 2 DIABETES MELLITUS WITHOUT COMPLICATION, WITHOUT LONG-TERM CURRENT USE OF INSULIN (H): ICD-10-CM

## 2021-11-02 DIAGNOSIS — N18.31 STAGE 3A CHRONIC KIDNEY DISEASE (H): ICD-10-CM

## 2021-11-02 DIAGNOSIS — E78.5 HYPERLIPIDEMIA LDL GOAL <100: ICD-10-CM

## 2021-11-02 DIAGNOSIS — F41.9 ANXIETY: ICD-10-CM

## 2021-11-02 DIAGNOSIS — Z87.19 HISTORY OF BARRETT'S ESOPHAGUS: ICD-10-CM

## 2021-11-02 DIAGNOSIS — N52.9 ERECTILE DYSFUNCTION, UNSPECIFIED ERECTILE DYSFUNCTION TYPE: ICD-10-CM

## 2021-11-02 DIAGNOSIS — I10 BENIGN ESSENTIAL HYPERTENSION: Primary | ICD-10-CM

## 2021-11-02 PROCEDURE — 99605 MTMS BY PHARM NP 15 MIN: CPT | Performed by: PHARMACIST

## 2021-11-02 PROCEDURE — 99607 MTMS BY PHARM ADDL 15 MIN: CPT | Performed by: PHARMACIST

## 2021-11-02 RX ORDER — SILDENAFIL 25 MG/1
25-50 TABLET, FILM COATED ORAL DAILY PRN
Status: ON HOLD | COMMUNITY
End: 2024-08-16

## 2021-11-02 RX ORDER — FERROUS GLUCONATE 324(37.5)
1 TABLET ORAL 2 TIMES DAILY
COMMUNITY

## 2021-11-02 NOTE — PROGRESS NOTES
Medication Therapy Management (MTM) Encounter    ASSESSMENT:                            Medication Adherence/Access: No issues identified    Hypertension/cKD: Stable. Patient is meeting blood pressure goal of < 140/90mmHg. Patient would benefit from stopping HCTZ as directed by nephrology and self-monitoring BP.    Type 2 Diabetes: Stable. Patient is meeting A1c goal of < 8%. Self monitoring of blood glucose is at goal of fasting  mg/dL. Aspirin for primary prevention is no longer indicated given age 70+. Alyson consulted with Dr. Sutton who agrees with stopping aspirin.    Hyperlipidemia: Stable.    GERD: Stable.    Anxiety: Stable.    Supplements: Stable.    ED: Patient may benefit from a different PDE-5 inhibitor. Alyson consulted with Dr. Sutton who agrees with trying sildenafil.    PLAN:                            1. Stop hydrochlorothiazide as directed by nephrology. Check your blood pressures at home and let us know if running >140/90 mmHg.  2. Start sildenafil 1-2 tablets (25-50mg) daily as needed 1 hour prior to sexual activity. Hopefully this works better than vardenafil.  3. Stop baby aspirin.   4. Flu shot today.     Follow-up: Return in 27 days (on 11/29/2021) for Primary Care Provider Visit.    SUBJECTIVE/OBJECTIVE:                          Michael Howard is a 80 year old male coming in for a follow-up visit. He was referred to me from Dr. Sutton.  Today's visit is a follow-up MTM visit from 3/18/20 with Nimisha Armendariz PharmD. This serves as an initial visit for 2021.    Reason for visit: Comprehensive medication review. Patient is still on HCTZ and wondering what to do because his doctors keep going back/forth on it.     Allergies/ADRs: Reviewed in chart  Past Medical History: Reviewed in chart  Tobacco: He reports that he has never smoked. He has never used smokeless tobacco.  Alcohol: glass of wine every once in a while    Medication Adherence/Access:   Patient uses pill box(es), sets  up himself.  Patient takes medications 2 time(s) per day.   Per patient, misses medication 0 times per week.   Medication barriers: none.   The patient fills medications at [place]: NO, fills medications at [facility].    Hypertension/CKD: Current medications include lisinopril 40mg daily and hydrochlorothiazide 12.5mg daily. Nephrology advised to stop HCTZ due to low /47 mmHg on 10/21/21.  Patient reports no current medication side effects, denies any orthostatic symptoms. Follows with nephrologist Dr. Zavala and urologist Jacey Haley PA-C. History of kidney stones.  PCP notes indicate: HCTZ decreased from 25mg qd on 8/18/20 to 12.5mg qd and discontinued on 5/25/21 and restarted on 7/22/21 for /82 mmHg and development of LLE edema; discontinued amlodipine 5mg qd at nephrology on 5/4/21.  Patient does not self-monitor BP, although does have a machine at home. I checked his orthostatic BP in clinic today - 126/70 mmHg sitting, 122/64 mmHg standing. History of SVT s/p ablation.  Last CMP on 7/22/21: sCr 1.42 mg/dL, eGFR 47 mL/min, and electrolytes WNL.    Type 2 Diabetes:  Currently taking metformin 500mg twice daily, reduced dose due to renal function. Patient is not experiencing side effects.   Blood sugar monitoring: one time daily, AM fasting. Ranges (patient reported): 116-128 mg/dL.  Symptoms of low blood sugar? jittery, agitated. Frequency of hypoglycemia? None recently  Symptoms of high blood sugar? None  Diet/Exercise: Did not discuss today.  Aspirin: Taking 81mg daily and denies side effects.  Statin: Yes: rosuvastatin.   ACEi/ARB: Yes: lisinopril. Last microalbumin was >30 mg/g on 7/22/21.   Aspirin: Taking 81mg daily and denies side effects.  Last A1c was 6.0% on 7/22/21.    Hyperlipidemia: Current therapy includes rosuvastatin 40mg daily. Patient reports no significant myalgias or other side effects.   Last lipid panel on 7/22/21: , trigs 104, HDL 47, LDL 83.     GERD: Current  medications include: omeprazole 40mg daily. No longer on ranitidine. Patient reports no current symptoms. Patient feels that current regimen is effective. History of Wright's esophagitis.     Anxiety: Current medications include: citalopram 20mg daily. Patient feels current therapy is effective and reports no side effects. Patient mentions his fiance.    Supplements: Currently taking Preservision Areds2 1 tablet twice daily (for macular degeneration), vitamin D 2000 daily, and ferrous gluconate 324mg twice daily (per nephrology). History of kidney stones so not on calcium.   Last hemoglobin 12.5 g/dL and iron studies WNL on 5/4/21.    ED: Currently taking vardenafil 20mg as needed. He doesn't feel it's working well anymore. Denies any side effect issues. Hasn't tried any others like Viagra or Cialis.    Today's Vitals: /70   Pulse 68   Wt 155 lb 5.6 oz (70.5 kg)   SpO2 98%   BMI 23.45 kg/m       ----------------    I spent 45 minutes with this patient today. All changes were made via collaborative practice agreement with Dr. Sutton. A copy of the visit note was provided to the patient's primary care provider.    The patient declined a summary of these recommendations.     Digna Escobar, PharmD, [facility]  Medication Therapy Management Pharmacist  Pager: [phone]     Medication Therapy Recommendations  Diabetes mellitus, type 2 (H)    Current Medication: ASPIRIN PO (Discontinued)   Rationale: No medical indication at this time - Unnecessary medication therapy - Indication   Recommendation: Discontinue Medication - Stop aspirin   Status: Accepted per Provider         Erectile dysfunction, unspecified erectile dysfunction type    Current Medication: vardenafil (LEVITRA) 20 MG tablet (Discontinued)   Rationale: More effective medication available - Ineffective medication - Effectiveness   Recommendation: Change Medication - sildenafil 25 MG tablet - Take 1-2 tablets daily as needed 1 hour prior to sexual  activity.   Status: Accepted per Provider

## 2021-11-02 NOTE — PATIENT INSTRUCTIONS
Recommendations from today's MTM visit:                                                    MTM (medication therapy management) is a service provided by a clinical pharmacist designed to help you get the most of out of your medicines.   Today we reviewed what your medicines are for, how to know if they are working, that your medicines are safe and how to make your medicine regimen as easy as possible.      1. Stop hydrochlorothiazide as directed by nephrology. Check your blood pressures at home and let us know if running >140/90 mmHg.    2. Start sildenafil 1-2 tablets (25-50mg) daily as needed 1 hour prior to sexual activity. Hopefully this works better than vardenafil.    3. Stop baby aspirin.     4. Flu shot today.    Follow-up: Return in 27 days (on 11/29/2021) for Primary Care Provider Visit.    It was great to speak with you today.  I value your experience and would be very thankful for your time with providing feedback on our clinic survey. You may receive a survey via email or text message in the next few days.     To schedule another MTM appointment, please call the clinic directly or you may call the MTM scheduling line at [phone] or toll-free at [phone].     My Clinical Pharmacist's contact information:                                                      Please feel free to contact me with any questions or concerns you have.      Digna Escobar, PharmD, [facility]  Medication Therapy Management Pharmacist  Pager: [phone]

## 2022-02-14 ENCOUNTER — TRANSFERRED RECORDS (OUTPATIENT)
Dept: HEALTH INFORMATION MANAGEMENT | Facility: CLINIC | Age: 82
End: 2022-02-14
Payer: COMMERCIAL

## 2022-02-14 LAB — HBA1C MFR BLD: 6.9 % (ref 4.8–5.6)

## 2022-04-10 ENCOUNTER — HEALTH MAINTENANCE LETTER (OUTPATIENT)
Age: 82
End: 2022-04-10

## 2022-05-16 ENCOUNTER — TRANSFERRED RECORDS (OUTPATIENT)
Dept: HEALTH INFORMATION MANAGEMENT | Facility: CLINIC | Age: 82
End: 2022-05-16

## 2022-05-16 LAB — HBA1C MFR BLD: 6.8 % (ref 4.8–5.6)

## 2022-07-15 NOTE — LETTER
6/13/2018     RE: Michael Howard  [address] [place] [address]     Dear Colleague,    Thank you for referring your patient, Michael Howard, to the [facility] at [facility]. Please see a copy of my visit note below.    Michael Howard is a 77-year-old male with a history of calcium oxalate monohydrate stones and BPH. He is voiding well, has no dysuria or hematuria. He has a normal urinalysis and no new stones on KUB. He does have small stones in the lower poles of both kidneys.  Other past medical history: Renal insufficiency with normal renal ultrasound in February of this year, benign colon polyps, arrhythmia, Wright esophagus, cataracts, diabetes, diverticulitis, Dupuytren contracture, GERD, hyperlipidemia, hypertension, depression, nonsmoker, TURP  Medications: Tylenol, amlodipine, low-dose aspirin, citalopram, fenofibrate, Fluzone, hydrochlorothiazide, lisinopril, metformin, omeprazole, ranitidine, Crestor, Levitra, vitamin D  Allergies: Cipro  Exam: Normal appearance, normal vital signs. Alert and oriented, normocephalic, normal respirations, neuro grossly intact. Normal sphincter tone, no rectal mass or impaction, benign feeling prostate, normal seminal vesicles  Assessment: Renal insufficiency, calcium oxalate monohydrate urolithiasis stable, BPH  Plan: See me for JOSIANE, urinalysis and KUB yearly    Again, thank you for allowing me to participate in the care of your patient.      Sincerely,    Giovanny Ross MD     - - -

## 2022-08-30 ENCOUNTER — VIRTUAL VISIT (OUTPATIENT)
Dept: PHARMACY | Facility: PHYSICIAN GROUP | Age: 82
End: 2022-08-30
Payer: COMMERCIAL

## 2022-08-30 DIAGNOSIS — Z78.9 TAKES DIETARY SUPPLEMENTS: ICD-10-CM

## 2022-08-30 DIAGNOSIS — I10 BENIGN ESSENTIAL HYPERTENSION: Primary | ICD-10-CM

## 2022-08-30 DIAGNOSIS — J30.9 ALLERGIC RHINITIS, UNSPECIFIED SEASONALITY, UNSPECIFIED TRIGGER: ICD-10-CM

## 2022-08-30 DIAGNOSIS — R21 RASH: ICD-10-CM

## 2022-08-30 DIAGNOSIS — E78.5 HYPERLIPIDEMIA LDL GOAL <100: ICD-10-CM

## 2022-08-30 DIAGNOSIS — F33.42 RECURRENT MAJOR DEPRESSIVE DISORDER, IN FULL REMISSION (H): ICD-10-CM

## 2022-08-30 DIAGNOSIS — E11.9 CONTROLLED TYPE 2 DIABETES MELLITUS WITHOUT COMPLICATION, WITHOUT LONG-TERM CURRENT USE OF INSULIN (H): ICD-10-CM

## 2022-08-30 DIAGNOSIS — N18.31 STAGE 3A CHRONIC KIDNEY DISEASE (H): ICD-10-CM

## 2022-08-30 DIAGNOSIS — Z87.19 HISTORY OF BARRETT'S ESOPHAGUS: ICD-10-CM

## 2022-08-30 DIAGNOSIS — N52.9 ERECTILE DYSFUNCTION, UNSPECIFIED ERECTILE DYSFUNCTION TYPE: ICD-10-CM

## 2022-08-30 PROCEDURE — 99607 MTMS BY PHARM ADDL 15 MIN: CPT | Performed by: PHARMACIST

## 2022-08-30 PROCEDURE — 99605 MTMS BY PHARM NP 15 MIN: CPT | Performed by: PHARMACIST

## 2022-08-30 RX ORDER — IPRATROPIUM BROMIDE 21 UG/1
2 SPRAY, METERED NASAL 2 TIMES DAILY
COMMUNITY

## 2022-08-30 RX ORDER — HYDROCHLOROTHIAZIDE 12.5 MG/1
12.5 TABLET ORAL DAILY
COMMUNITY

## 2022-08-30 RX ORDER — DESONIDE 0.5 MG/G
CREAM TOPICAL
COMMUNITY

## 2022-08-30 NOTE — LETTER
_  Medication List        Prepared on: Aug 30, 2022     Bring your Medication List when you go to the doctor, hospital, or   emergency room. And, share it with your family or caregivers.     Note any changes to how you take your medications.  Cross out medications when you no longer use them.    Medication How I take it Why I use it Prescriber   blood glucose monitoring (ONE TOUCH ULTRASOFT) lancets Check blood sugars once daily as directed Diabetes Alda José PA-C   CITALOPRAM HYDROBROMIDE PO Take 1 tablet (20 mg) by mouth daily Mood Alda José PA-C   desonide (DESOWEN) 0.05 % external cream Apply topically 2 times daily as needed Rash Alda José PA-C   Ferrous Gluconate 324 (37.5 Fe) MG TABS Take 1 tablet by mouth 2 times daily General health Alda José PA-C   hydrochlorothiazide (HYDRODIURIL) 12.5 MG tablet Take 1 tablet (12.5 mg) by mouth daily Blood pressure Alda José PA-C   ipratropium (ATROVENT) 0.03 % nasal spray Spray 2 sprays into both nostrils every 12 hours Allergies Michael Liu MD   LISINOPRIL PO Take 1 tablet (40 mg) by mouth daily Blood pressure Alda José PA-C   METFORMIN HCL PO Take 1 tablet (500 mg) by mouth 2 times daily (with meals) Diabetes Alda José PA-C   Multiple Vitamins-Minerals (PRESERVISION AREDS 2 PO) Take 1 tablet by mouth 2 times daily General health Alda José PA-C   omeprazole (PRILOSEC) 40 MG DR capsule Take 1 capsule (40 mg) by mouth every morning Wright's Esophagus Alda José PA-C   ONETOUCH ULTRA test strip Check blood sugars once daily as directed Diabetes Alda José PA-C   Rosuvastatin Calcium (CRESTOR PO) Take 1 tablet (40 mg) by mouth daily Cholesterol Alda José PA-C   sildenafil (VIAGRA) 25 MG tablet Take 1-2 tablets (25-50 mg) by mouth daily as needed (1 hour prior to sexual activity) Erectile Dysfunction Alda José PA-C   Vitamin D (Cholecalciferol) 50 MCG (2000 UT) CAPS Take 1  capsule by mouth daily General health Alda José PA-C         Add new medications, over-the-counter drugs, herbals, vitamins, or  minerals in the blank rows below.    Medication How I take it Why I use it Prescriber                          Allergies:      ciprofibrate; ciprofloxacin        Side effects I have had:               Other Information:              My notes and questions:

## 2022-08-30 NOTE — PROGRESS NOTES
Medication Therapy Management (MTM) Encounter    ASSESSMENT:                            Medication Adherence/Access: No issues identified    Hypertension/CKD: Stable. Patient is meeting blood pressure goal of < 130/80mmHg. Follow-up plan in place with nephrology.     Type 2 Diabetes: Stable. Patient is meeting A1c goal of < 7%. Self monitoring of blood glucose is at goal of fasting  mg/dL.    Hyperlipidemia: Stable.    GERD: Stable.    Depression: Stable.    Supplements: Stable.    ED: Stable.    Rash: Stable.    Allergic Rhinitis: Stable.    PLAN:                            1. Continue same medications.   2. Upcoming PCP visit scheduled on 9/2.    Follow-up: Return in about 1 year (around 8/30/2023) for Medication Therapy Management.    SUBJECTIVE/OBJECTIVE:                          Michael Howard is a 81 year old male called for a follow-up visit.  Today's visit is a follow-up MTM visit from 11/2/21. This serves as an initial visit for 2022.    Reason for visit: Comprehensive medication review, no specific concerns today.    Allergies/ADRs: Reviewed in chart  Past Medical History: Reviewed in chart  Tobacco: He reports that he has never smoked. He has never used smokeless tobacco.  Alcohol: occasional wine    Medication Adherence/Access:   Patient uses pill box(es), sets up himself.  Patient takes medications 2 time(s) per day.   Per patient, misses medication 0 times per week.   Medication barriers: none.   The patient fills medications at [place]: NO, fills medications at [facility].    Hypertension/CKD: Current medications include lisinopril 40mg daily and hydrochlorothiazide 12.5mg daily. Patient does not self-monitor BP, although does have a machine at home. Patient is not experiencing side effects. Follows with nephrologist Dr. Zavala and urologist Jacey Haley PA-C. History of kidney stones and SVT s/p ablation.  Last clinic /66 mmHg on 5/16/22.  Last CMP on 5/16/22: sCr 1.43 mg/dL, eGFR 50  "mL/min, and electrolytes WNL.    Type 2 Diabetes:  Currently taking metformin 500mg twice daily, reduced dose due to renal function. Patient is not experiencing side effects.   Blood sugar monitoring: once daily, AM fasting. Ranges (patient reported): went up in 140's on vacation, now coming back down into 110-120's mg/dL.  Symptoms of low blood sugar? Shaky, crappy, but no lows lately.  Symptoms of high blood sugar? None  Diet/Exercise: Did not discuss today.  Aspirin: Taking 81mg daily and denies side effects.  Statin: Yes: rosuvastatin.   ACEi/ARB: Yes: lisinopril. Last microalbumin was >30 mg/g on 5/16/22.   Aspirin: Not taking due to age 70+.  Last A1c was 6.8% on 5/16/22.    Hyperlipidemia: Current therapy includes rosuvastatin 40mg daily. Patient reports no significant myalgias or other side effects.   Last lipid panel on 5/16/22: , trigs 217, HDL 37, LDL 79.     GERD: Current medications include: omeprazole 40mg daily. Patient reports no current symptoms. Patient feels that current regimen is effective. History of Wright's esophagitis.     Depression: Current medications include: citalopram 20mg daily. Patient feels current therapy is effective and reports no side effects.     Supplements: Currently taking Preservision Areds2 1 tablet twice daily (for macular degeneration), vitamin D 2000 daily, and ferrous gluconate 324mg twice daily. Iron and vit D managed by nephrology - \"labs on 5/16 look good\".    ED: Currently taking sildenafil 25-50mg as needed. Patient feels current therapy is effective and reports no side effects.    Rash: Currently taking desonide 0.05% as needed. Occasionally uses for spot in between his eyes. No concerns today.    Allergic Rhinitis: Current medications include ipratropium 0.3% 2 sprays both nostrils twice daily. Primary symptoms are runny nose, post-nasal drip, and itchy/watery eyes. Patient feels current therapy is effective and reports no side effects. Follows with ENT " Dr. Liu.    Today's Vitals: There were no vitals taken for this visit.     ----------------    I spent 20 minutes with this patient today. All changes were made via collaborative practice agreement with Alda José PA-C. A copy of the visit note was provided to the patient's provider(s).    The patient was sent via [facility] Access a summary of these recommendations.     Digna Escobar, PharmD, BCACP  Medication Therapy Management Pharmacist  Pager: [phone]    Telemedicine Visit Details  Type of service:  Telephone visit  Start Time: 1545  End Time: 1505  Originating Location (patient location): [place]  Distant Location (provider location):  [facility] MTM     Medication Therapy Recommendations  No medication therapy recommendations to display

## 2022-08-30 NOTE — LETTER
August 31, 2022  Michael Howard  [address] [place] [address]    Dear Mr. Howard, [facility]     Thank you for talking with me on Aug 30, 2022 about your health and medications. As a follow-up to our conversation, I have included two documents:      1. Your Recommended To-Do List has steps you should take to get the best results from your medications.  2. Your Medication List will help you keep track of your medications and how to take them.    If you want to talk about these documents, please call Digna Escobar RPH at phone: [phone], Monday-Friday 8-4:30pm.    I look forward to working with you and your doctors to make sure your medications work well for you.    Sincerely,  Digna Escobar RPH  [facility] Pharmacist, [facility]

## 2022-08-31 NOTE — PATIENT INSTRUCTIONS
"Recommendations from today's MTM visit:                                                    MTM (medication therapy management) is a service provided by a clinical pharmacist designed to help you get the most of out of your medicines.   Today we reviewed what your medicines are for, how to know if they are working, that your medicines are safe and how to make your medicine regimen as easy as possible.      1. Continue same medications.   2. Upcoming PCP visit scheduled on 9/2.    Follow-up: Return in about 1 year (around 8/30/2023) for Medication Therapy Management.    It was great speaking with you today.  I value your experience and would be very thankful for your time in providing feedback in our clinic survey. In the next few days, you may receive an email or text message from [employer] with a link to a survey related to your  clinical pharmacist.\"     To schedule another MTM appointment, please call the clinic directly or you may call the MTM scheduling line at [phone] or toll-free at [phone].     My Clinical Pharmacist's contact information:                                                      Please feel free to contact me with any questions or concerns you have.      Digna Escobar, PharmD, BCACP  Medication Therapy Management Pharmacist  Pager: [phone]    "

## 2022-09-16 ENCOUNTER — TELEPHONE (OUTPATIENT)
Dept: UROLOGY | Facility: CLINIC | Age: 82
End: 2022-09-16

## 2022-09-16 DIAGNOSIS — N20.0 NEPHROLITHIASIS: Primary | ICD-10-CM

## 2022-09-16 NOTE — TELEPHONE ENCOUNTER
[facility] Call Center    Phone Message    May a detailed message be left on voicemail: yes     Reason for Call: Pt called regarding KUB order he needs before his appt with Dania on 22. Writer sees KUB order that has . Pt needs new order placed. Please review and follow-up with patient.     Action Taken: [facility]    Travel Screening: Not Applicable

## 2022-09-23 ENCOUNTER — OFFICE VISIT (OUTPATIENT)
Dept: UROLOGY | Facility: CLINIC | Age: 82
End: 2022-09-23
Payer: COMMERCIAL

## 2022-09-23 ENCOUNTER — HOSPITAL ENCOUNTER (OUTPATIENT)
Dept: GENERAL RADIOLOGY | Facility: CLINIC | Age: 82
Discharge: HOME | End: 2022-09-23
Attending: PHYSICIAN ASSISTANT | Admitting: PHYSICIAN ASSISTANT
Payer: COMMERCIAL

## 2022-09-23 VITALS
HEIGHT: 68 IN | DIASTOLIC BLOOD PRESSURE: 74 MMHG | WEIGHT: 154 LBS | OXYGEN SATURATION: 99 % | SYSTOLIC BLOOD PRESSURE: 129 MMHG | HEART RATE: 55 BPM | BODY MASS INDEX: 23.34 KG/M2

## 2022-09-23 DIAGNOSIS — N40.0 BENIGN PROSTATIC HYPERPLASIA WITHOUT LOWER URINARY TRACT SYMPTOMS: ICD-10-CM

## 2022-09-23 DIAGNOSIS — N20.0 NEPHROLITHIASIS: ICD-10-CM

## 2022-09-23 DIAGNOSIS — N20.0 NEPHROLITHIASIS: Primary | ICD-10-CM

## 2022-09-23 DIAGNOSIS — N52.01 ERECTILE DYSFUNCTION DUE TO ARTERIAL INSUFFICIENCY: ICD-10-CM

## 2022-09-23 LAB
ALBUMIN UR-MCNC: 30 MG/DL
APPEARANCE UR: CLEAR
BILIRUB UR QL STRIP: NEGATIVE
COLOR UR AUTO: YELLOW
GLUCOSE UR STRIP-MCNC: NEGATIVE MG/DL
HGB UR QL STRIP: NEGATIVE
KETONES UR STRIP-MCNC: NEGATIVE MG/DL
LEUKOCYTE ESTERASE UR QL STRIP: NEGATIVE
NITRATE UR QL: NEGATIVE
PH UR STRIP: 6 [PH] (ref 5–7)
SP GR UR STRIP: 1.01 (ref 1–1.03)
UROBILINOGEN UR STRIP-ACNC: 0.2 E.U./DL

## 2022-09-23 PROCEDURE — 99214 OFFICE O/P EST MOD 30 MIN: CPT | Performed by: PHYSICIAN ASSISTANT

## 2022-09-23 PROCEDURE — 74018 RADEX ABDOMEN 1 VIEW: CPT

## 2022-09-23 PROCEDURE — 81003 URINALYSIS AUTO W/O SCOPE: CPT | Mod: QW | Performed by: PHYSICIAN ASSISTANT

## 2022-09-23 ASSESSMENT — PAIN SCALES - GENERAL: PAINLEVEL: NO PAIN (0)

## 2022-09-23 NOTE — PROGRESS NOTES
"CHIEF COMPLAINT/REASON FOR VISIT   Nephrolithiasis follow up  Med refill    HISTORY OF PRESENT ILLNESS   Mr. Howard is a very pleasant 81-year-old gentleman, who presents today for follow-up regarding nephrolithiasis.  He also has a history of BPH.  He previously followed with Dr. Ross.  Also has a history of TURP.    Since last time we saw him, patient denies any recurrent episodes of nephrolithiasis.  He has not had any urinary tract infections, hematuria, dysuria, urgency, or frequency of urination.  He feels that he is emptying his bladder well.    Patient does have CKD stage III.    He has changed from Levitra to sildenafil with great results.  He is tolerating this.       Patient Active Problem List   Diagnosis     Chronic kidney disease, stage III (moderate)     BPH (benign prostatic hyperplasia)     ACP (advance care planning)     Diabetes mellitus, type 2 (H)      Past Medical History:   Diagnosis Date     Adenomatous colon polyp      Arrhythmia     Hx SVT/ablation     Wright esophagus      Cataract      Chronic kidney disease, stage III (moderate) (H)      Diabetes mellitus (H)     Type 2     Diarrhea      Diverticulitis      Dupuytren contracture      GERD (gastroesophageal reflux disease)      H/O prostatitis      Hyperlipidemia      Hypertension      Lesion of mouth      Major depression      Right inguinal hernia      Stones, bladder, diverticulum      Urethral stricture          Allergies   Allergen Reactions     Ciprofibrate GI Disturbance     with abd pain     Ciprofloxacin      Gi bleed         Physical Exam   /74   Pulse 55   Ht 1.727 m (5' 8\")   Wt 69.9 kg (154 lb)   SpO2 99%   BMI 23.42 kg/m    PSYCH: NAD  EYES: EOMI  MOUTH: MMM  NEURO: AAO x3  JOSIANE: normal tone, no masses, (medium) prostate without nodules or tenderness.      IMAGING     I personally reviewed the images. KUB TODAY without stones, but formal report pend.    Result Date: 7/26/2021  XR KUB 7/26/2021 10:53 AM HISTORY: " history of stones; Kidney stones COMPARISON: 7/29/2020     IMPRESSION: No definite urinary tract calculi. Bowel gas pattern is nonobstructed. DONTA GARCIA MD   SYSTEM ID:  [id number]     Assessment & Plan    1. Nephrolithiasis    2. BPH s/p TURP  3. ED  -Recommendation follow-up in 1 year with KUB  -Patient is doing well on sildenafil through PCP..      Claudia Najera PA-C  [facility] Urology  [phone]

## 2022-09-23 NOTE — LETTER
"9/23/2022       RE: Michael Howard  [address] [address]     Dear Colleague,    Thank you for referring your patient, Michael Howard, to the [facility] at [facility]. Please see a copy of my visit note below.    CHIEF COMPLAINT/REASON FOR VISIT   Nephrolithiasis follow up  Med refill    HISTORY OF PRESENT ILLNESS   Mr. Howard is a very pleasant 81-year-old gentleman, who presents today for follow-up regarding nephrolithiasis.  He also has a history of BPH.  He previously followed with Dr. Ross.  Also has a history of TURP.    Since last time we saw him, patient denies any recurrent episodes of nephrolithiasis.  He has not had any urinary tract infections, hematuria, dysuria, urgency, or frequency of urination.  He feels that he is emptying his bladder well.    Patient does have CKD stage III.    He has changed from Levitra to sildenafil with great results.  He is tolerating this.       Patient Active Problem List   Diagnosis     Chronic kidney disease, stage III (moderate)     BPH (benign prostatic hyperplasia)     ACP (advance care planning)     Diabetes mellitus, type 2 (H)      Past Medical History:   Diagnosis Date     Adenomatous colon polyp      Arrhythmia     Hx SVT/ablation     Wright esophagus      Cataract      Chronic kidney disease, stage III (moderate) (H)      Diabetes mellitus (H)     Type 2     Diarrhea      Diverticulitis      Dupuytren contracture      GERD (gastroesophageal reflux disease)      H/O prostatitis      Hyperlipidemia      Hypertension      Lesion of mouth      Major depression      Right inguinal hernia      Stones, bladder, diverticulum      Urethral stricture          Allergies   Allergen Reactions     Ciprofibrate GI Disturbance     with abd pain     Ciprofloxacin      Gi bleed         Physical Exam   /74   Pulse 55   Ht 1.727 m (5' 8\")   Wt 69.9 kg (154 lb)   SpO2 99%   BMI " 23.42 kg/m    PSYCH: NAD  EYES: EOMI  MOUTH: MMM  NEURO: AAO x3  JOSIANE: normal tone, no masses, (medium) prostate without nodules or tenderness.      IMAGING     I personally reviewed the images. KUB TODAY without stones, but formal report pend.    Result Date: 7/26/2021  XR KUB 7/26/2021 10:53 AM HISTORY: history of stones; Kidney stones COMPARISON: 7/29/2020     IMPRESSION: No definite urinary tract calculi. Bowel gas pattern is nonobstructed. DONTA GARCIA MD   SYSTEM ID:  [id number]     Assessment & Plan    1. Nephrolithiasis    2. BPH s/p TURP  3. ED  -Recommendation follow-up in 1 year with KUB  -Patient is doing well on sildenafil through PCP..      Claudia Najera PA-C  [facility] Urology  [phone]

## 2022-10-16 ENCOUNTER — HEALTH MAINTENANCE LETTER (OUTPATIENT)
Age: 82
End: 2022-10-16

## 2022-12-03 ENCOUNTER — HEALTH MAINTENANCE LETTER (OUTPATIENT)
Age: 82
End: 2022-12-03

## 2023-06-01 ENCOUNTER — HEALTH MAINTENANCE LETTER (OUTPATIENT)
Age: 83
End: 2023-06-01

## 2023-06-17 ENCOUNTER — HEALTH MAINTENANCE LETTER (OUTPATIENT)
Age: 83
End: 2023-06-17

## 2023-08-02 ENCOUNTER — HOSPITAL ENCOUNTER (OUTPATIENT)
Facility: CLINIC | Age: 83
End: 2023-08-02
Attending: INTERNAL MEDICINE | Admitting: INTERNAL MEDICINE
Payer: COMMERCIAL

## 2023-09-11 ENCOUNTER — ANCILLARY PROCEDURE (OUTPATIENT)
Dept: GENERAL RADIOLOGY | Facility: CLINIC | Age: 83
End: 2023-09-11
Attending: PHYSICIAN ASSISTANT
Payer: COMMERCIAL

## 2023-09-11 DIAGNOSIS — N20.0 NEPHROLITHIASIS: ICD-10-CM

## 2023-09-11 PROCEDURE — 74018 RADEX ABDOMEN 1 VIEW: CPT

## 2023-09-18 ENCOUNTER — VIRTUAL VISIT (OUTPATIENT)
Dept: UROLOGY | Facility: CLINIC | Age: 83
End: 2023-09-18
Payer: COMMERCIAL

## 2023-09-18 DIAGNOSIS — N20.0 NEPHROLITHIASIS: Primary | ICD-10-CM

## 2023-09-18 DIAGNOSIS — N40.0 BENIGN PROSTATIC HYPERPLASIA WITHOUT LOWER URINARY TRACT SYMPTOMS: ICD-10-CM

## 2023-09-18 PROCEDURE — 99214 OFFICE O/P EST MOD 30 MIN: CPT | Mod: VID | Performed by: PHYSICIAN ASSISTANT

## 2023-09-18 NOTE — LETTER
9/18/2023       RE: Michael Howard  [address] [place] [address]     Dear Colleague,    Thank you for referring your patient, Michael Howard, to the [facility] at [facility]. Please see a copy of my visit note below.    Virtual Visit Details    Type of service:  Video Visit   Video Start Time: 11:09 AM  Video End Time:11:18 AM    Originating Location (pt. Location): Home    Distant Location (provider location):  On-site  Platform used for Video Visit: Ale    CHIEF COMPLAINT/REASON FOR VISIT   Nephrolithiasis follow up  Med refill    HISTORY OF PRESENT ILLNESS   Mr. Howard is a very pleasant 82-year-old gentleman, who presents today for follow-up regarding nephrolithiasis.  He also has a history of BPH.  He previously followed with Dr. Ross.  Also has a history of TURP.    Patient denies any recurrent episodes of nephrolithiasis.  He has not had any urinary tract infections, hematuria, dysuria, urgency, or frequency of urination.  He feels that he is emptying his bladder well.    Patient does have CKD stage III.    No longer using sildenafil as his fiance is on Hospice.        Patient Active Problem List   Diagnosis    Chronic kidney disease, stage III (moderate)    BPH (benign prostatic hyperplasia)    ACP (advance care planning)    Diabetes mellitus, type 2 (H)      Past Medical History:   Diagnosis Date    Adenomatous colon polyp     Arrhythmia     Hx SVT/ablation    Wright esophagus     Cataract     Chronic kidney disease, stage III (moderate) (H)     Diabetes mellitus (H)     Type 2    Diarrhea     Diverticulitis     Dupuytren contracture     GERD (gastroesophageal reflux disease)     H/O prostatitis     Hyperlipidemia     Hypertension     Lesion of mouth     Major depression     Right inguinal hernia     Stones, bladder, diverticulum     Urethral stricture          Allergies   Allergen Reactions    Ciprofibrate GI  Disturbance     with abd pain    Ciprofloxacin      Gi bleed         Physical Exam   There were no vitals taken for this visit.  PSYCH: NAD  EYES: EOMI  MOUTH: MMM  NEURO: AAO x3  JOSIANE: normal tone, no masses, (medium) prostate without nodules or tenderness (last visit).      IMAGING     I personally reviewed the images. KUB without stones, but formal report pend.  Narrative & Impression   EXAM: XR KUB  LOCATION: [facility]  DATE: 9/11/2023     INDICATION:  Nephrolithiasis  COMPARISON: None.                                                                      IMPRESSION: Nonobstructive bowel gas pattern. Moderate to large stool burden is seen throughout the colon. Round calcifications overlying the pelvis likely reflect phleboliths. No discrete calcification seen overlying the kidneys although evaluation is   limited due to overlying stool and gas. Degenerative changes seen in the spine and hips.     Order-Level Documents:  MD DOMINIQUE   SYSTEM ID:  [id number]     A/P:    1. BPH s/p TURP  2. ED  3. Nephrolithiasis    -follow-up in 1 year with LOREE Najera PA-C  [facility]  [phone]

## 2023-09-18 NOTE — NURSING NOTE
Is the patient currently in the state of [place]? YES    Visit mode:VIDEO    If the visit is dropped, the patient can be reconnected by: TELEPHONE VISIT: Phone number:   Telephone Information:   Mobile [phone]       Will anyone else be joining the visit? NO  (If patient encounters technical issues they should call [phone]    How would you like to obtain your AVS? MyChart    Are changes needed to the allergy or medication list? Pt stated no med changes    Reason for visit: Video Visit (Nephrolithiasis )    Rosamaria GARCIA

## 2023-09-18 NOTE — PROGRESS NOTES
Virtual Visit Details    Type of service:  Video Visit   Video Start Time: 11:09 AM  Video End Time:11:18 AM    Originating Location (pt. Location): Home    Distant Location (provider location):  On-site  Platform used for Video Visit: Ale    CHIEF COMPLAINT/REASON FOR VISIT   Nephrolithiasis follow up  Med refill    HISTORY OF PRESENT ILLNESS   Mr. Howard is a very pleasant 82-year-old gentleman, who presents today for follow-up regarding nephrolithiasis.  He also has a history of BPH.  He previously followed with Dr. Ross.  Also has a history of TURP.    Patient denies any recurrent episodes of nephrolithiasis.  He has not had any urinary tract infections, hematuria, dysuria, urgency, or frequency of urination.  He feels that he is emptying his bladder well.    Patient does have CKD stage III.    No longer using sildenafil as his fiance is on Hospice.        Patient Active Problem List   Diagnosis    Chronic kidney disease, stage III (moderate)    BPH (benign prostatic hyperplasia)    ACP (advance care planning)    Diabetes mellitus, type 2 (H)      Past Medical History:   Diagnosis Date    Adenomatous colon polyp     Arrhythmia     Hx SVT/ablation    Wright esophagus     Cataract     Chronic kidney disease, stage III (moderate) (H)     Diabetes mellitus (H)     Type 2    Diarrhea     Diverticulitis     Dupuytren contracture     GERD (gastroesophageal reflux disease)     H/O prostatitis     Hyperlipidemia     Hypertension     Lesion of mouth     Major depression     Right inguinal hernia     Stones, bladder, diverticulum     Urethral stricture          Allergies   Allergen Reactions    Ciprofibrate GI Disturbance     with abd pain    Ciprofloxacin      Gi bleed         Physical Exam   There were no vitals taken for this visit.  PSYCH: NAD  EYES: EOMI  MOUTH: MMM  NEURO: AAO x3  JOSIANE: normal tone, no masses, (medium) prostate without nodules or tenderness (last visit).      IMAGING     I personally reviewed  the images. KUB without stones, but formal report pend.  Narrative & Impression   EXAM: XR KUB  LOCATION: [facility]  DATE: 9/11/2023     INDICATION:  Nephrolithiasis  COMPARISON: None.                                                                      IMPRESSION: Nonobstructive bowel gas pattern. Moderate to large stool burden is seen throughout the colon. Round calcifications overlying the pelvis likely reflect phleboliths. No discrete calcification seen overlying the kidneys although evaluation is   limited due to overlying stool and gas. Degenerative changes seen in the spine and hips.     Order-Level Documents:  MD DOMINIQUE   SYSTEM ID:  [id number]     A/P:    1. BPH s/p TURP  2. ED  3. Nephrolithiasis    -follow-up in 1 year with LOREE Najera PA-C  [facility]  [phone]

## 2024-01-13 ENCOUNTER — HEALTH MAINTENANCE LETTER (OUTPATIENT)
Age: 84
End: 2024-01-13

## 2024-01-15 ENCOUNTER — TRANSFERRED RECORDS (OUTPATIENT)
Dept: HEALTH INFORMATION MANAGEMENT | Facility: CLINIC | Age: 84
End: 2024-01-15
Payer: COMMERCIAL

## 2024-01-15 ENCOUNTER — MEDICAL CORRESPONDENCE (OUTPATIENT)
Dept: HEALTH INFORMATION MANAGEMENT | Facility: CLINIC | Age: 84
End: 2024-01-15
Payer: COMMERCIAL

## 2024-01-15 LAB — PHQ9 SCORE: 12

## 2024-01-16 DIAGNOSIS — E78.5 HYPERLIPEMIA: Primary | ICD-10-CM

## 2024-01-16 DIAGNOSIS — I73.9 PERIPHERAL VASCULAR DISEASE (H): ICD-10-CM

## 2024-01-16 DIAGNOSIS — I10 BENIGN ESSENTIAL HYPERTENSION: ICD-10-CM

## 2024-02-12 ENCOUNTER — OFFICE VISIT (OUTPATIENT)
Dept: CARDIOLOGY | Facility: CLINIC | Age: 84
End: 2024-02-12
Payer: COMMERCIAL

## 2024-02-12 VITALS
BODY MASS INDEX: 22.49 KG/M2 | DIASTOLIC BLOOD PRESSURE: 66 MMHG | WEIGHT: 148.4 LBS | SYSTOLIC BLOOD PRESSURE: 123 MMHG | HEART RATE: 62 BPM | HEIGHT: 68 IN

## 2024-02-12 DIAGNOSIS — I73.9 PERIPHERAL VASCULAR DISEASE (H): ICD-10-CM

## 2024-02-12 DIAGNOSIS — M48.062 SPINAL STENOSIS OF LUMBAR REGION WITH NEUROGENIC CLAUDICATION: Primary | ICD-10-CM

## 2024-02-12 DIAGNOSIS — I10 BENIGN ESSENTIAL HYPERTENSION: ICD-10-CM

## 2024-02-12 PROCEDURE — 93000 ELECTROCARDIOGRAM COMPLETE: CPT | Performed by: INTERNAL MEDICINE

## 2024-02-12 PROCEDURE — 99203 OFFICE O/P NEW LOW 30 MIN: CPT | Performed by: INTERNAL MEDICINE

## 2024-02-12 NOTE — PROGRESS NOTES
"Cardiology Consultation      Michael Howard MRN# [id number]   YOB: 1940 Age: 83 year old   Date of Visit 02/12/2024     Reason for consult: Exertional leg pain           Assessment and Plan:     Exertional leg pain, symmetric and positive straight leg testing reproducing his symptoms, suspect neurogenic claudication  Conservative management unless symptoms worsen.  Could consider exercise MILLY but at this point would favor neurogenic etiology given the symmetry.    30 minutes spent today in review of past medical record, discussion with patient and postvisit charting    This note was transcribed using electronic voice recognition software, typographical errors may be present.                Chief Complaint:   Hyperlipidemia           History of Present Illness:   This patient is a very pleasant 83 year old male that was seen a few years ago in cardiology clinic for sinus bradycardia.  Echocardiogram was normal.  He has no known history of obstructive coronary artery disease.    He was sent by his primary clinician for exertional leg pain.  I discussed with the patient, he gets 1 block claudication in bilateral legs that happens in the backs of the thighs and the calves.    I took the liberty of doing straight leg testing in the office today and he states that straight leg raise reproduces his symptoms on each leg when raised.             Physical Exam:     Vitals: /66   Pulse 62   Ht 1.727 m (5' 8\")   Wt 67.3 kg (148 lb 6.4 oz)   BMI 22.56 kg/m    Constitutional:  cooperative, alert and oriented, well developed, well nourished, in no acute distress        Skin:  warm and dry to the touch, no apparent skin lesions or masses noted        Head:  normocephalic, no masses or lesions        Eyes:  pupils equal and round, conjunctivae and lids unremarkable, sclera white, no xanthalasma, EOMS intact, no nystagmus        ENT:  no pallor or cyanosis        Neck:  JVP normal        Chest:  normal " symmetry        Cardiac: regular rhythm;normal S1 and S2                  Abdomen:  BS normoactive        Extremities and Back:  no deformities, clubbing, cyanosis, erythema observed        Neurological:  no gross motor deficits;affect appropriate                      Past Medical History:   I have reviewed this patient's past medical history  Past Medical History:   Diagnosis Date    Adenomatous colon polyp     Arrhythmia     Hx SVT/ablation    Wright esophagus     Cataract     Chronic kidney disease, stage III (moderate) (H)     Diabetes mellitus (H)     Type 2    Diarrhea     Diverticulitis     Dupuytren contracture     GERD (gastroesophageal reflux disease)     H/O prostatitis     Hyperlipidemia     Hypertension     Lesion of mouth     Major depression     Right inguinal hernia     Stones, bladder, diverticulum     Urethral stricture              Past Surgical History:   I have reviewed this patient's past surgical history  Past Surgical History:   Procedure Laterality Date    APPENDECTOMY      CARDIAC SURGERY      coronary ablation for SVT    COLONOSCOPY      CYSTOSCOPY  05/15/2019    Dr Ross     CYSTOSCOPY, TRANSURETHRAL RESECTION (TUR) PROSTATE, COMBINED N/A 2/3/2016    Procedure: COMBINED CYSTOSCOPY, TRANSURETHRAL RESECTION (TUR) PROSTATE;  Surgeon: Giovanny Ross MD;  Location:  OR    ENT SURGERY      nasal polyps, tonsilectomy    EXTRACORPOREAL SHOCK WAVE LITHOTRIPSY (ESWL)  10/9/2013    Procedure: EXTRACORPOREAL SHOCK WAVE LITHOTRIPSY (ESWL);  LEFT EXTRACORPOREAL SHOCK WAVE LITHOTRIPSY (ESWL) ;  Surgeon: Giovanny Ross MD;  Location:  OR    EYE SURGERY      retinal detachment repair    HERNIA REPAIR      L ing hernia repair    LAPAROSCOPIC HERNIORRHAPHY INGUINAL Right 11/10/2016    Procedure: LAPAROSCOPIC HERNIORRHAPHY INGUINAL;  Surgeon: Sebastian Hunt MD;  Location:  OR    PHACOEMULSIFICATION CLEAR CORNEA WITH STANDARD INTRAOCULAR LENS IMPLANT  4/3/2014    Procedure:  PHACOEMULSIFICATION CLEAR CORNEA WITH STANDARD INTRAOCULAR LENS IMPLANT;  LEFT PHACOEMULSIFICATION CLEAR CORNEA WITH STANDARD INTRAOCULAR LENS IMPLANT ;  Surgeon: Keanu Pollock MD;  Location:  EC    PHACOEMULSIFICATION CLEAR CORNEA WITH STANDARD INTRAOCULAR LENS IMPLANT Right 9/8/2016    Procedure: PHACOEMULSIFICATION CLEAR CORNEA WITH STANDARD INTRAOCULAR LENS IMPLANT;  Surgeon: Keanu Pollock MD;  Location:  EC    VASECTOMY                 Social History:   I have reviewed this patient's social history  Social History     Tobacco Use    Smoking status: Never    Smokeless tobacco: Never   Substance Use Topics    Alcohol use: Yes     Comment: Rare glass of wine             Family History:   I have reviewed this patient's family history  History reviewed. No pertinent family history.          Allergies:     Allergies   Allergen Reactions    Ciprofibrate GI Disturbance     with abd pain    Ciprofloxacin      Gi bleed             Medications:   I have reviewed this patient's current medications  Current Outpatient Medications   Medication Sig Dispense Refill    citalopram (CELEXA) 20 MG tablet Take 20 mg by mouth daily      desonide (DESOWEN) 0.05 % external cream Apply topically 2 times daily as needed      Ferrous Gluconate 324 (37.5 Fe) MG TABS Take 1 tablet by mouth 2 times daily      hydrochlorothiazide (HYDRODIURIL) 12.5 MG tablet Take 12.5 mg by mouth daily      ipratropium (ATROVENT) 0.03 % nasal spray Spray 2 sprays into both nostrils every 12 hours      lisinopril (ZESTRIL) 40 MG tablet Take 40 mg by mouth daily      metFORMIN (GLUCOPHAGE) 500 MG tablet Take 500 mg by mouth 2 times daily (with meals)      Multiple Vitamins-Minerals (PRESERVISION AREDS 2 PO) Take 1 tablet by mouth 2 times daily      omeprazole (PRILOSEC) 40 MG DR capsule Take 40 mg by mouth every morning      rosuvastatin (CRESTOR) 40 MG tablet Take 40 mg by mouth daily      sildenafil (VIAGRA) 25 MG tablet Take 25-50 mg by  "mouth daily as needed (1 hour prior to sexual activity)      Vitamin D (Cholecalciferol) 50 MCG (2000 UT) CAPS Take 1 capsule by mouth daily      blood glucose monitoring (ONE TOUCH ULTRASOFT) lancets Check blood sugars once daily as directed  2    ONETOUCH ULTRA test strip daily Check blood sugars once daily as directed  3               Review of Systems:       Review of Systems:  Skin:        Eyes:       ENT:       Respiratory:  Negative    Cardiovascular:    Positive for;dizziness;lightheadedness  Gastroenterology:      Genitourinary:       Musculoskeletal:       Neurologic:       Psychiatric:       Heme/Lymph/Imm:  Positive for allergies  Endocrine:  Positive for diabetes                     Data:   All available laboratory data reviewed  No results found for: \"CHOL\"  No results found for: \"HDL\"  No results found for: \"LDL\"  Lab Results   Component Value Date    TRIG 80 01/15/2024     No results found for: \"CHOLHDLRATIO\"  No results found for: \"TSH\"  Last Basic Metabolic Panel:  Lab Results   Component Value Date     04/17/2021      Lab Results   Component Value Date    POTASSIUM 3.6 04/17/2021     Lab Results   Component Value Date    CHLORIDE 100 01/15/2024    CHLORIDE 98 04/17/2021     Lab Results   Component Value Date    JESUS 9.4 04/17/2021     Lab Results   Component Value Date    CO2 28 04/17/2021     Lab Results   Component Value Date    BUN 33 04/17/2021     Lab Results   Component Value Date    CR 1.52 04/17/2021     Lab Results   Component Value Date     04/17/2021     Lab Results   Component Value Date    WBC 11.9 04/17/2021     Lab Results   Component Value Date    RBC 3.74 04/17/2021     Lab Results   Component Value Date    HGB 11.3 04/17/2021     Lab Results   Component Value Date    HCT 33.8 04/17/2021     Lab Results   Component Value Date    MCV 90 04/17/2021     Lab Results   Component Value Date    MCH 30.2 04/17/2021     Lab Results   Component Value Date    MCHC 33.4 04/17/2021 "     Lab Results   Component Value Date    RDW 12.5 04/17/2021     Lab Results   Component Value Date     04/17/2021

## 2024-02-12 NOTE — LETTER
"2/12/2024    Alda José PA-C  [address]  [facility] [address]    RE: Michael Howard       Dear Colleague,     I had the pleasure of seeing iMchael Howard in the [facility].  Cardiology Consultation      Michael Howard MRN# [id number]   YOB: 1940 Age: 83 year old   Date of Visit 02/12/2024     Reason for consult: Exertional leg pain           Assessment and Plan:     Exertional leg pain, symmetric and positive straight leg testing reproducing his symptoms, suspect neurogenic claudication  Conservative management unless symptoms worsen.  Could consider exercise MILLY but at this point would favor neurogenic etiology given the symmetry.    30 minutes spent today in review of past medical record, discussion with patient and postvisit charting    This note was transcribed using electronic voice recognition software, typographical errors may be present.                Chief Complaint:   Hyperlipidemia           History of Present Illness:   This patient is a very pleasant 83 year old male that was seen a few years ago in cardiology clinic for sinus bradycardia.  Echocardiogram was normal.  He has no known history of obstructive coronary artery disease.    He was sent by his primary clinician for exertional leg pain.  I discussed with the patient, he gets 1 block claudication in bilateral legs that happens in the backs of the thighs and the calves.    I took the liberty of doing straight leg testing in the office today and he states that straight leg raise reproduces his symptoms on each leg when raised.             Physical Exam:     Vitals: /66   Pulse 62   Ht 1.727 m (5' 8\")   Wt 67.3 kg (148 lb 6.4 oz)   BMI 22.56 kg/m    Constitutional:  cooperative, alert and oriented, well developed, well nourished, in no acute distress        Skin:  warm and dry to the touch, no apparent skin lesions or masses noted        Head:  normocephalic, no masses or lesions  "       Eyes:  pupils equal and round, conjunctivae and lids unremarkable, sclera white, no xanthalasma, EOMS intact, no nystagmus        ENT:  no pallor or cyanosis        Neck:  JVP normal        Chest:  normal symmetry        Cardiac: regular rhythm;normal S1 and S2                  Abdomen:  BS normoactive        Extremities and Back:  no deformities, clubbing, cyanosis, erythema observed        Neurological:  no gross motor deficits;affect appropriate                      Past Medical History:   I have reviewed this patient's past medical history  Past Medical History:   Diagnosis Date    Adenomatous colon polyp     Arrhythmia     Hx SVT/ablation    Wright esophagus     Cataract     Chronic kidney disease, stage III (moderate) (H)     Diabetes mellitus (H)     Type 2    Diarrhea     Diverticulitis     Dupuytren contracture     GERD (gastroesophageal reflux disease)     H/O prostatitis     Hyperlipidemia     Hypertension     Lesion of mouth     Major depression     Right inguinal hernia     Stones, bladder, diverticulum     Urethral stricture              Past Surgical History:   I have reviewed this patient's past surgical history  Past Surgical History:   Procedure Laterality Date    APPENDECTOMY      CARDIAC SURGERY      coronary ablation for SVT    COLONOSCOPY      CYSTOSCOPY  05/15/2019    Dr Ross     CYSTOSCOPY, TRANSURETHRAL RESECTION (TUR) PROSTATE, COMBINED N/A 2/3/2016    Procedure: COMBINED CYSTOSCOPY, TRANSURETHRAL RESECTION (TUR) PROSTATE;  Surgeon: Giovanny Ross MD;  Location:  OR    ENT SURGERY      nasal polyps, tonsilectomy    EXTRACORPOREAL SHOCK WAVE LITHOTRIPSY (ESWL)  10/9/2013    Procedure: EXTRACORPOREAL SHOCK WAVE LITHOTRIPSY (ESWL);  LEFT EXTRACORPOREAL SHOCK WAVE LITHOTRIPSY (ESWL) ;  Surgeon: Giovanny Ross MD;  Location:  OR    EYE SURGERY      retinal detachment repair    HERNIA REPAIR      L ing hernia repair    LAPAROSCOPIC HERNIORRHAPHY INGUINAL Right 11/10/2016     Procedure: LAPAROSCOPIC HERNIORRHAPHY INGUINAL;  Surgeon: Sebastian Hunt MD;  Location:  OR    PHACOEMULSIFICATION CLEAR CORNEA WITH STANDARD INTRAOCULAR LENS IMPLANT  4/3/2014    Procedure: PHACOEMULSIFICATION CLEAR CORNEA WITH STANDARD INTRAOCULAR LENS IMPLANT;  LEFT PHACOEMULSIFICATION CLEAR CORNEA WITH STANDARD INTRAOCULAR LENS IMPLANT ;  Surgeon: Keanu Pollock MD;  Location:  [facility]    PHACOEMULSIFICATION CLEAR CORNEA WITH STANDARD INTRAOCULAR LENS IMPLANT Right 9/8/2016    Procedure: PHACOEMULSIFICATION CLEAR CORNEA WITH STANDARD INTRAOCULAR LENS IMPLANT;  Surgeon: Keanu Pollock MD;  Location:  EC    VASECTOMY                 Social History:   I have reviewed this patient's social history  Social History     Tobacco Use    Smoking status: Never    Smokeless tobacco: Never   Substance Use Topics    Alcohol use: Yes     Comment: Rare glass of wine             Family History:   I have reviewed this patient's family history  History reviewed. No pertinent family history.          Allergies:     Allergies   Allergen Reactions    Ciprofibrate GI Disturbance     with abd pain    Ciprofloxacin      Gi bleed             Medications:   I have reviewed this patient's current medications  Current Outpatient Medications   Medication Sig Dispense Refill    citalopram (CELEXA) 20 MG tablet Take 20 mg by mouth daily      desonide (DESOWEN) 0.05 % external cream Apply topically 2 times daily as needed      Ferrous Gluconate 324 (37.5 Fe) MG TABS Take 1 tablet by mouth 2 times daily      hydrochlorothiazide (HYDRODIURIL) 12.5 MG tablet Take 12.5 mg by mouth daily      ipratropium (ATROVENT) 0.03 % nasal spray Spray 2 sprays into both nostrils every 12 hours      lisinopril (ZESTRIL) 40 MG tablet Take 40 mg by mouth daily      metFORMIN (GLUCOPHAGE) 500 MG tablet Take 500 mg by mouth 2 times daily (with meals)      Multiple Vitamins-Minerals (PRESERVISION AREDS 2 PO) Take 1 tablet by mouth 2 times daily       "omeprazole (PRILOSEC) 40 MG DR capsule Take 40 mg by mouth every morning      rosuvastatin (CRESTOR) 40 MG tablet Take 40 mg by mouth daily      sildenafil (VIAGRA) 25 MG tablet Take 25-50 mg by mouth daily as needed (1 hour prior to sexual activity)      Vitamin D (Cholecalciferol) 50 MCG (2000 UT) CAPS Take 1 capsule by mouth daily      blood glucose monitoring (ONE TOUCH ULTRASOFT) lancets Check blood sugars once daily as directed  2    ONETOUCH ULTRA test strip daily Check blood sugars once daily as directed  3               Review of Systems:       Review of Systems:  Skin:        Eyes:       ENT:       Respiratory:  Negative    Cardiovascular:    Positive for;dizziness;lightheadedness  Gastroenterology:      Genitourinary:       Musculoskeletal:       Neurologic:       Psychiatric:       Heme/Lymph/Imm:  Positive for allergies  Endocrine:  Positive for diabetes                     Data:   All available laboratory data reviewed  No results found for: \"CHOL\"  No results found for: \"HDL\"  No results found for: \"LDL\"  Lab Results   Component Value Date    TRIG 80 01/15/2024     No results found for: \"CHOLHDLRATIO\"  No results found for: \"TSH\"  Last Basic Metabolic Panel:  Lab Results   Component Value Date     04/17/2021      Lab Results   Component Value Date    POTASSIUM 3.6 04/17/2021     Lab Results   Component Value Date    CHLORIDE 100 01/15/2024    CHLORIDE 98 04/17/2021     Lab Results   Component Value Date    JESUS 9.4 04/17/2021     Lab Results   Component Value Date    CO2 28 04/17/2021     Lab Results   Component Value Date    BUN 33 04/17/2021     Lab Results   Component Value Date    CR 1.52 04/17/2021     Lab Results   Component Value Date     04/17/2021     Lab Results   Component Value Date    WBC 11.9 04/17/2021     Lab Results   Component Value Date    RBC 3.74 04/17/2021     Lab Results   Component Value Date    HGB 11.3 04/17/2021     Lab Results   Component Value Date    HCT 33.8 " 04/17/2021     Lab Results   Component Value Date    MCV 90 04/17/2021     Lab Results   Component Value Date    MCH 30.2 04/17/2021     Lab Results   Component Value Date    MCHC 33.4 04/17/2021     Lab Results   Component Value Date    RDW 12.5 04/17/2021     Lab Results   Component Value Date     04/17/2021   Thank you for allowing me to participate in the care of your patient.      Sincerely,     Eric Alas MD     [facility]  cc:   Alda José PA-C  [address]  [place] [address]

## 2024-06-10 ENCOUNTER — TRANSFERRED RECORDS (OUTPATIENT)
Dept: HEALTH INFORMATION MANAGEMENT | Facility: CLINIC | Age: 84
End: 2024-06-10
Payer: COMMERCIAL

## 2024-06-10 ENCOUNTER — MEDICAL CORRESPONDENCE (OUTPATIENT)
Dept: HEALTH INFORMATION MANAGEMENT | Facility: CLINIC | Age: 84
End: 2024-06-10
Payer: COMMERCIAL

## 2024-06-13 ENCOUNTER — MEDICAL CORRESPONDENCE (OUTPATIENT)
Dept: HEALTH INFORMATION MANAGEMENT | Facility: CLINIC | Age: 84
End: 2024-06-13
Payer: COMMERCIAL

## 2024-08-10 ENCOUNTER — HEALTH MAINTENANCE LETTER (OUTPATIENT)
Age: 84
End: 2024-08-10

## 2024-08-16 ENCOUNTER — APPOINTMENT (OUTPATIENT)
Dept: CT IMAGING | Facility: CLINIC | Age: 84
DRG: 872 | End: 2024-08-16
Attending: STUDENT IN AN ORGANIZED HEALTH CARE EDUCATION/TRAINING PROGRAM
Payer: COMMERCIAL

## 2024-08-16 ENCOUNTER — APPOINTMENT (OUTPATIENT)
Dept: GENERAL RADIOLOGY | Facility: CLINIC | Age: 84
DRG: 872 | End: 2024-08-16
Attending: STUDENT IN AN ORGANIZED HEALTH CARE EDUCATION/TRAINING PROGRAM
Payer: COMMERCIAL

## 2024-08-16 ENCOUNTER — HOSPITAL ENCOUNTER (INPATIENT)
Facility: CLINIC | Age: 84
LOS: 3 days | Discharge: HOME HEALTH | DRG: 872 | End: 2024-08-19
Attending: STUDENT IN AN ORGANIZED HEALTH CARE EDUCATION/TRAINING PROGRAM | Admitting: HOSPITALIST
Payer: COMMERCIAL

## 2024-08-16 ENCOUNTER — APPOINTMENT (OUTPATIENT)
Dept: ULTRASOUND IMAGING | Facility: CLINIC | Age: 84
DRG: 872 | End: 2024-08-16
Attending: STUDENT IN AN ORGANIZED HEALTH CARE EDUCATION/TRAINING PROGRAM
Payer: COMMERCIAL

## 2024-08-16 DIAGNOSIS — L03.116 CELLULITIS OF LEFT LOWER EXTREMITY: ICD-10-CM

## 2024-08-16 DIAGNOSIS — R53.1 GENERALIZED WEAKNESS: Primary | ICD-10-CM

## 2024-08-16 DIAGNOSIS — A41.9 SEVERE SEPSIS (H): ICD-10-CM

## 2024-08-16 DIAGNOSIS — R65.20 SEVERE SEPSIS (H): ICD-10-CM

## 2024-08-16 PROBLEM — M62.81 GENERALIZED MUSCLE WEAKNESS: Status: ACTIVE | Noted: 2024-08-16

## 2024-08-16 LAB
ALBUMIN SERPL BCG-MCNC: 4.4 G/DL (ref 3.5–5.2)
ALBUMIN UR-MCNC: 50 MG/DL
ALP SERPL-CCNC: 70 U/L (ref 40–150)
ALT SERPL W P-5'-P-CCNC: 11 U/L (ref 0–70)
ANION GAP SERPL CALCULATED.3IONS-SCNC: 17 MMOL/L (ref 7–15)
APPEARANCE UR: CLEAR
AST SERPL W P-5'-P-CCNC: 19 U/L (ref 0–45)
B-OH-BUTYR SERPL-SCNC: 0.58 MMOL/L
BASOPHILS # BLD AUTO: 0.1 10E3/UL (ref 0–0.2)
BASOPHILS NFR BLD AUTO: 0 %
BILIRUB SERPL-MCNC: 0.6 MG/DL
BILIRUB UR QL STRIP: NEGATIVE
BUN SERPL-MCNC: 27.9 MG/DL (ref 8–23)
CALCIUM SERPL-MCNC: 10.1 MG/DL (ref 8.8–10.4)
CHLORIDE SERPL-SCNC: 98 MMOL/L (ref 98–107)
CK SERPL-CCNC: 329 U/L (ref 39–308)
COLOR UR AUTO: ABNORMAL
CREAT SERPL-MCNC: 1.66 MG/DL (ref 0.67–1.17)
EGFRCR SERPLBLD CKD-EPI 2021: 41 ML/MIN/1.73M2
EOSINOPHIL # BLD AUTO: 0 10E3/UL (ref 0–0.7)
EOSINOPHIL NFR BLD AUTO: 0 %
ERYTHROCYTE [DISTWIDTH] IN BLOOD BY AUTOMATED COUNT: 12.6 % (ref 10–15)
FLUAV RNA SPEC QL NAA+PROBE: NEGATIVE
FLUBV RNA RESP QL NAA+PROBE: NEGATIVE
GLUCOSE BLDC GLUCOMTR-MCNC: 134 MG/DL (ref 70–99)
GLUCOSE BLDC GLUCOMTR-MCNC: 142 MG/DL (ref 70–99)
GLUCOSE SERPL-MCNC: 237 MG/DL (ref 70–99)
GLUCOSE UR STRIP-MCNC: >=1000 MG/DL
HCO3 SERPL-SCNC: 25 MMOL/L (ref 22–29)
HCT VFR BLD AUTO: 42.2 % (ref 40–53)
HGB BLD-MCNC: 14.6 G/DL (ref 13.3–17.7)
HGB UR QL STRIP: ABNORMAL
IMM GRANULOCYTES # BLD: 0.1 10E3/UL
IMM GRANULOCYTES NFR BLD: 0 %
KETONES UR STRIP-MCNC: NEGATIVE MG/DL
LACTATE SERPL-SCNC: 1.9 MMOL/L (ref 0.7–2)
LACTATE SERPL-SCNC: 4.2 MMOL/L (ref 0.7–2)
LEUKOCYTE ESTERASE UR QL STRIP: NEGATIVE
LYMPHOCYTES # BLD AUTO: 0.5 10E3/UL (ref 0.8–5.3)
LYMPHOCYTES NFR BLD AUTO: 3 %
MCH RBC QN AUTO: 31.8 PG (ref 26.5–33)
MCHC RBC AUTO-ENTMCNC: 34.6 G/DL (ref 31.5–36.5)
MCV RBC AUTO: 92 FL (ref 78–100)
MONOCYTES # BLD AUTO: 1.3 10E3/UL (ref 0–1.3)
MONOCYTES NFR BLD AUTO: 8 %
MUCOUS THREADS #/AREA URNS LPF: PRESENT /LPF
NEUTROPHILS # BLD AUTO: 13.9 10E3/UL (ref 1.6–8.3)
NEUTROPHILS NFR BLD AUTO: 88 %
NITRATE UR QL: NEGATIVE
NRBC # BLD AUTO: 0 10E3/UL
NRBC BLD AUTO-RTO: 0 /100
PH UR STRIP: 7.5 [PH] (ref 5–7)
PLATELET # BLD AUTO: 227 10E3/UL (ref 150–450)
POTASSIUM SERPL-SCNC: 4.1 MMOL/L (ref 3.4–5.3)
PROCALCITONIN SERPL IA-MCNC: 0.47 NG/ML
PROT SERPL-MCNC: 7.1 G/DL (ref 6.4–8.3)
RBC # BLD AUTO: 4.59 10E6/UL (ref 4.4–5.9)
RBC URINE: 73 /HPF
RSV RNA SPEC NAA+PROBE: NEGATIVE
SARS-COV-2 RNA RESP QL NAA+PROBE: NEGATIVE
SODIUM SERPL-SCNC: 140 MMOL/L (ref 135–145)
SP GR UR STRIP: 1.02 (ref 1–1.03)
SQUAMOUS EPITHELIAL: <1 /HPF
UROBILINOGEN UR STRIP-MCNC: NORMAL MG/DL
WBC # BLD AUTO: 15.8 10E3/UL (ref 4–11)
WBC URINE: 1 /HPF

## 2024-08-16 PROCEDURE — 85025 COMPLETE CBC W/AUTO DIFF WBC: CPT | Performed by: STUDENT IN AN ORGANIZED HEALTH CARE EDUCATION/TRAINING PROGRAM

## 2024-08-16 PROCEDURE — 36415 COLL VENOUS BLD VENIPUNCTURE: CPT | Performed by: STUDENT IN AN ORGANIZED HEALTH CARE EDUCATION/TRAINING PROGRAM

## 2024-08-16 PROCEDURE — 250N000013 HC RX MED GY IP 250 OP 250 PS 637: Performed by: HOSPITALIST

## 2024-08-16 PROCEDURE — 250N000011 HC RX IP 250 OP 636: Performed by: STUDENT IN AN ORGANIZED HEALTH CARE EDUCATION/TRAINING PROGRAM

## 2024-08-16 PROCEDURE — 120N000001 HC R&B MED SURG/OB

## 2024-08-16 PROCEDURE — 81001 URINALYSIS AUTO W/SCOPE: CPT | Performed by: STUDENT IN AN ORGANIZED HEALTH CARE EDUCATION/TRAINING PROGRAM

## 2024-08-16 PROCEDURE — 82040 ASSAY OF SERUM ALBUMIN: CPT | Performed by: STUDENT IN AN ORGANIZED HEALTH CARE EDUCATION/TRAINING PROGRAM

## 2024-08-16 PROCEDURE — 258N000003 HC RX IP 258 OP 636: Performed by: HOSPITALIST

## 2024-08-16 PROCEDURE — 250N000013 HC RX MED GY IP 250 OP 250 PS 637: Performed by: STUDENT IN AN ORGANIZED HEALTH CARE EDUCATION/TRAINING PROGRAM

## 2024-08-16 PROCEDURE — 83605 ASSAY OF LACTIC ACID: CPT | Performed by: STUDENT IN AN ORGANIZED HEALTH CARE EDUCATION/TRAINING PROGRAM

## 2024-08-16 PROCEDURE — 87040 BLOOD CULTURE FOR BACTERIA: CPT | Performed by: STUDENT IN AN ORGANIZED HEALTH CARE EDUCATION/TRAINING PROGRAM

## 2024-08-16 PROCEDURE — 87637 SARSCOV2&INF A&B&RSV AMP PRB: CPT | Performed by: STUDENT IN AN ORGANIZED HEALTH CARE EDUCATION/TRAINING PROGRAM

## 2024-08-16 PROCEDURE — 83605 ASSAY OF LACTIC ACID: CPT | Performed by: HOSPITALIST

## 2024-08-16 PROCEDURE — 96375 TX/PRO/DX INJ NEW DRUG ADDON: CPT

## 2024-08-16 PROCEDURE — 71046 X-RAY EXAM CHEST 2 VIEWS: CPT

## 2024-08-16 PROCEDURE — 99285 EMERGENCY DEPT VISIT HI MDM: CPT | Mod: 25

## 2024-08-16 PROCEDURE — 82550 ASSAY OF CK (CPK): CPT | Performed by: STUDENT IN AN ORGANIZED HEALTH CARE EDUCATION/TRAINING PROGRAM

## 2024-08-16 PROCEDURE — 84145 PROCALCITONIN (PCT): CPT | Performed by: HOSPITALIST

## 2024-08-16 PROCEDURE — 99223 1ST HOSP IP/OBS HIGH 75: CPT | Performed by: HOSPITALIST

## 2024-08-16 PROCEDURE — 93970 EXTREMITY STUDY: CPT

## 2024-08-16 PROCEDURE — 96361 HYDRATE IV INFUSION ADD-ON: CPT

## 2024-08-16 PROCEDURE — 74176 CT ABD & PELVIS W/O CONTRAST: CPT

## 2024-08-16 PROCEDURE — 70450 CT HEAD/BRAIN W/O DYE: CPT

## 2024-08-16 PROCEDURE — 73562 X-RAY EXAM OF KNEE 3: CPT | Mod: 50

## 2024-08-16 PROCEDURE — 250N000011 HC RX IP 250 OP 636: Performed by: HOSPITALIST

## 2024-08-16 PROCEDURE — 258N000003 HC RX IP 258 OP 636: Performed by: STUDENT IN AN ORGANIZED HEALTH CARE EDUCATION/TRAINING PROGRAM

## 2024-08-16 PROCEDURE — 82010 KETONE BODYS QUAN: CPT | Performed by: STUDENT IN AN ORGANIZED HEALTH CARE EDUCATION/TRAINING PROGRAM

## 2024-08-16 PROCEDURE — 36415 COLL VENOUS BLD VENIPUNCTURE: CPT | Performed by: HOSPITALIST

## 2024-08-16 PROCEDURE — 96365 THER/PROPH/DIAG IV INF INIT: CPT | Mod: 59

## 2024-08-16 RX ORDER — AMOXICILLIN 250 MG
2 CAPSULE ORAL 2 TIMES DAILY
Status: DISCONTINUED | OUTPATIENT
Start: 2024-08-16 | End: 2024-08-19 | Stop reason: HOSPADM

## 2024-08-16 RX ORDER — LIDOCAINE 40 MG/G
CREAM TOPICAL
Status: DISCONTINUED | OUTPATIENT
Start: 2024-08-16 | End: 2024-08-19 | Stop reason: HOSPADM

## 2024-08-16 RX ORDER — FERROUS GLUCONATE 324(37.5)
1 TABLET ORAL 2 TIMES DAILY
Status: DISCONTINUED | OUTPATIENT
Start: 2024-08-16 | End: 2024-08-16

## 2024-08-16 RX ORDER — PIPERACILLIN SODIUM, TAZOBACTAM SODIUM 4; .5 G/20ML; G/20ML
4.5 INJECTION, POWDER, LYOPHILIZED, FOR SOLUTION INTRAVENOUS ONCE
Status: COMPLETED | OUTPATIENT
Start: 2024-08-16 | End: 2024-08-16

## 2024-08-16 RX ORDER — HYDROCHLOROTHIAZIDE 12.5 MG/1
12.5 TABLET ORAL DAILY
Status: DISCONTINUED | OUTPATIENT
Start: 2024-08-16 | End: 2024-08-19 | Stop reason: HOSPADM

## 2024-08-16 RX ORDER — CALCIUM CARBONATE 500 MG/1
1000 TABLET, CHEWABLE ORAL 4 TIMES DAILY PRN
Status: DISCONTINUED | OUTPATIENT
Start: 2024-08-16 | End: 2024-08-19 | Stop reason: HOSPADM

## 2024-08-16 RX ORDER — CEFAZOLIN SODIUM 1 G/50ML
750 SOLUTION INTRAVENOUS EVERY 24 HOURS
Status: DISCONTINUED | OUTPATIENT
Start: 2024-08-17 | End: 2024-08-19

## 2024-08-16 RX ORDER — AMOXICILLIN 250 MG
1 CAPSULE ORAL 2 TIMES DAILY
Status: DISCONTINUED | OUTPATIENT
Start: 2024-08-16 | End: 2024-08-19 | Stop reason: HOSPADM

## 2024-08-16 RX ORDER — DEXTROSE MONOHYDRATE 25 G/50ML
25-50 INJECTION, SOLUTION INTRAVENOUS
Status: DISCONTINUED | OUTPATIENT
Start: 2024-08-16 | End: 2024-08-19 | Stop reason: HOSPADM

## 2024-08-16 RX ORDER — PANTOPRAZOLE SODIUM 40 MG/1
40 TABLET, DELAYED RELEASE ORAL 2 TIMES DAILY
Status: DISCONTINUED | OUTPATIENT
Start: 2024-08-16 | End: 2024-08-19 | Stop reason: HOSPADM

## 2024-08-16 RX ORDER — ROSUVASTATIN CALCIUM 20 MG/1
40 TABLET, COATED ORAL AT BEDTIME
Status: DISCONTINUED | OUTPATIENT
Start: 2024-08-16 | End: 2024-08-19 | Stop reason: HOSPADM

## 2024-08-16 RX ORDER — NICOTINE POLACRILEX 4 MG
15-30 LOZENGE BUCCAL
Status: DISCONTINUED | OUTPATIENT
Start: 2024-08-16 | End: 2024-08-19 | Stop reason: HOSPADM

## 2024-08-16 RX ORDER — HEPARIN SODIUM 5000 [USP'U]/.5ML
5000 INJECTION, SOLUTION INTRAVENOUS; SUBCUTANEOUS EVERY 8 HOURS
Status: DISCONTINUED | OUTPATIENT
Start: 2024-08-16 | End: 2024-08-19 | Stop reason: HOSPADM

## 2024-08-16 RX ORDER — HYDRALAZINE HYDROCHLORIDE 20 MG/ML
10 INJECTION INTRAMUSCULAR; INTRAVENOUS EVERY 4 HOURS PRN
Status: DISCONTINUED | OUTPATIENT
Start: 2024-08-16 | End: 2024-08-19 | Stop reason: HOSPADM

## 2024-08-16 RX ORDER — SODIUM CHLORIDE 9 MG/ML
INJECTION, SOLUTION INTRAVENOUS CONTINUOUS
Status: ACTIVE | OUTPATIENT
Start: 2024-08-16 | End: 2024-08-17

## 2024-08-16 RX ORDER — ACETAMINOPHEN 500 MG
1000 TABLET ORAL ONCE
Status: COMPLETED | OUTPATIENT
Start: 2024-08-16 | End: 2024-08-16

## 2024-08-16 RX ORDER — AMOXICILLIN 250 MG
1 CAPSULE ORAL 2 TIMES DAILY PRN
Status: DISCONTINUED | OUTPATIENT
Start: 2024-08-16 | End: 2024-08-19 | Stop reason: HOSPADM

## 2024-08-16 RX ORDER — VANCOMYCIN 1.75 GRAM/500 ML IN 0.9 % SODIUM CHLORIDE INTRAVENOUS
1750 ONCE
Status: COMPLETED | OUTPATIENT
Start: 2024-08-16 | End: 2024-08-16

## 2024-08-16 RX ORDER — PIPERACILLIN SODIUM, TAZOBACTAM SODIUM 3; .375 G/15ML; G/15ML
3.38 INJECTION, POWDER, LYOPHILIZED, FOR SOLUTION INTRAVENOUS EVERY 6 HOURS
Status: DISCONTINUED | OUTPATIENT
Start: 2024-08-16 | End: 2024-08-16

## 2024-08-16 RX ORDER — LISINOPRIL 40 MG/1
40 TABLET ORAL DAILY
Status: DISCONTINUED | OUTPATIENT
Start: 2024-08-17 | End: 2024-08-19 | Stop reason: HOSPADM

## 2024-08-16 RX ORDER — AMOXICILLIN 250 MG
2 CAPSULE ORAL 2 TIMES DAILY PRN
Status: DISCONTINUED | OUTPATIENT
Start: 2024-08-16 | End: 2024-08-19 | Stop reason: HOSPADM

## 2024-08-16 RX ORDER — PIPERACILLIN SODIUM, TAZOBACTAM SODIUM 3; .375 G/15ML; G/15ML
3.38 INJECTION, POWDER, LYOPHILIZED, FOR SOLUTION INTRAVENOUS EVERY 6 HOURS
Status: DISCONTINUED | OUTPATIENT
Start: 2024-08-16 | End: 2024-08-19 | Stop reason: HOSPADM

## 2024-08-16 RX ORDER — CITALOPRAM HYDROBROMIDE 10 MG/1
30 TABLET ORAL DAILY
Status: DISCONTINUED | OUTPATIENT
Start: 2024-08-16 | End: 2024-08-19 | Stop reason: HOSPADM

## 2024-08-16 RX ADMIN — PANTOPRAZOLE SODIUM 40 MG: 40 TABLET, DELAYED RELEASE ORAL at 21:51

## 2024-08-16 RX ADMIN — ROSUVASTATIN CALCIUM 40 MG: 20 TABLET, FILM COATED ORAL at 21:50

## 2024-08-16 RX ADMIN — HEPARIN SODIUM 5000 UNITS: 5000 INJECTION, SOLUTION INTRAVENOUS; SUBCUTANEOUS at 21:50

## 2024-08-16 RX ADMIN — VANCOMYCIN HYDROCHLORIDE 1750 MG: 10 INJECTION, POWDER, LYOPHILIZED, FOR SOLUTION INTRAVENOUS at 12:25

## 2024-08-16 RX ADMIN — SODIUM CHLORIDE 1000 ML: 9 INJECTION, SOLUTION INTRAVENOUS at 10:43

## 2024-08-16 RX ADMIN — SODIUM CHLORIDE 1000 ML: 9 INJECTION, SOLUTION INTRAVENOUS at 09:34

## 2024-08-16 RX ADMIN — HEPARIN SODIUM 5000 UNITS: 5000 INJECTION, SOLUTION INTRAVENOUS; SUBCUTANEOUS at 14:26

## 2024-08-16 RX ADMIN — SODIUM CHLORIDE: 9 INJECTION, SOLUTION INTRAVENOUS at 14:25

## 2024-08-16 RX ADMIN — PIPERACILLIN AND TAZOBACTAM 3.38 G: 3; .375 INJECTION, POWDER, FOR SOLUTION INTRAVENOUS at 17:04

## 2024-08-16 RX ADMIN — ACETAMINOPHEN 1000 MG: 500 TABLET, FILM COATED ORAL at 09:43

## 2024-08-16 RX ADMIN — PIPERACILLIN AND TAZOBACTAM 4.5 G: 4; .5 INJECTION, POWDER, FOR SOLUTION INTRAVENOUS at 11:55

## 2024-08-16 RX ADMIN — SENNOSIDES AND DOCUSATE SODIUM 1 TABLET: 50; 8.6 TABLET ORAL at 21:55

## 2024-08-16 RX ADMIN — CITALOPRAM HYDROBROMIDE 30 MG: 10 TABLET ORAL at 17:03

## 2024-08-16 ASSESSMENT — ACTIVITIES OF DAILY LIVING (ADL)
ADLS_ACUITY_SCORE: 41
ADLS_ACUITY_SCORE: 50
ADLS_ACUITY_SCORE: 50
ADLS_ACUITY_SCORE: 35
ADLS_ACUITY_SCORE: 41
ADLS_ACUITY_SCORE: 41
ADLS_ACUITY_SCORE: 49
ADLS_ACUITY_SCORE: 41
ADLS_ACUITY_SCORE: 41

## 2024-08-16 ASSESSMENT — COLUMBIA-SUICIDE SEVERITY RATING SCALE - C-SSRS
6. HAVE YOU EVER DONE ANYTHING, STARTED TO DO ANYTHING, OR PREPARED TO DO ANYTHING TO END YOUR LIFE?: NO
2. HAVE YOU ACTUALLY HAD ANY THOUGHTS OF KILLING YOURSELF IN THE PAST MONTH?: NO
1. IN THE PAST MONTH, HAVE YOU WISHED YOU WERE DEAD OR WISHED YOU COULD GO TO SLEEP AND NOT WAKE UP?: NO

## 2024-08-16 NOTE — ED NOTES
Patient arrived with soaked clothing, urine was wiped off and patient was changed into a gown and given a blanket.

## 2024-08-16 NOTE — PHARMACY-VANCOMYCIN DOSING SERVICE
"Pharmacy Vancomycin Initial Note  Date of Service 2024  Patient's  1940  83 year old, male    Indication: Sepsis    Current estimated CrCl = Estimated Creatinine Clearance: 32.5 mL/min (A) (based on SCr of 1.66 mg/dL (H)).    Creatinine for last 3 days  2024:  9:16 AM Creatinine 1.66 mg/dL    Recent Vancomycin Level(s) for last 3 days  No results found for requested labs within last 3 days.      Vancomycin IV Administrations (past 72 hours)                     vancomycin (VANCOCIN) 1,750 mg in 0.9% NaCl 500 mL intermittent infusion (mg) 1,750 mg New Bag 24 1225                    Nephrotoxins and other renal medications (From now, onward)      Start     Dose/Rate Route Frequency Ordered Stop    24 1800  piperacillin-tazobactam (ZOSYN) 3.375 g vial to attach to  mL bag        Note to Pharmacy: For [facility], [facility] and [facility]: For Zosyn-naive patients, use the \"Zosyn initial dose + extended infusion\" order panel.    3.375 g  over 30 Minutes Intravenous EVERY 6 HOURS 24 1357      24 1140  vancomycin (VANCOCIN) 1,750 mg in 0.9% NaCl 500 mL intermittent infusion         1,750 mg  250 mL/hr over 2 Hours Intravenous ONCE 24 1135              Contrast Orders - past 72 hours (72h ago, onward)      None            [employer] Prediction of Planned Initial Vancomycin Regimen  Loading dose: N/A  Regimen: 750 mg IV every 24 hours.  Start time: 00:25 on 2024  Exposure target: AUC24 (range)400-600 mg/L.hr   AUC24,ss: 475 mg/L.hr  Probability of AUC24 > 400: 69 %  Ctrough,ss: 15.6 mg/L  Probability of Ctrough,ss > 20: 27 %  Probability of nephrotoxicity (Lodise CHELE ): 11 %        Plan:  Vanco 1750 mg x 1 given in ED. Start vancomycin 750 mg IV q24h.   Vancomycin monitoring method: AUC  Vancomycin therapeutic monitoring goal: 400-600 mg*h/L  Pharmacy will check vancomycin levels as appropriate in 3-5 Days.    Serum creatinine levels will be ordered daily for the first week of " therapy and at least twice weekly for subsequent weeks.      Ibis Johansen RPH

## 2024-08-16 NOTE — ED PROVIDER NOTES
Emergency Department Note      History of Present Illness     Chief Complaint   Generalized Weakness      HPI   Michael Howard is a 83 year old male with history of type 2 diabetes mellitus, CKD 3b, nephrolithiasis, hypertension, BPH, and urethral stricture who presents to the ED via EMS for evaluation of generalized weakness. Michael reports he was feeling unwell yesterday and awoke today unable to get himself out of bed, which he can do at his baseline. He slid out of bed and landed on his bilateral knees, where he now endorses pain. Denies loss of consciousness or head injury. He notes urinary frequency over the last few days but no dysuria. Also his mouth is dry but he has been eating and drinking well.  upon ED arrival. He mentions having history of similar episodes. Denies history of UTI. Denies bowel symptoms, headache, chest pain, shortness of breath, nausea, vomiting, abdominal pain, or back pain. Allergic to Cipro.    Independent Historian   None    Review of External Notes   none    Past Medical History     Medical History and Problem List   Adenomatous colon polyp  Arrhythmia  Wright esophagus  Cataract  CKD stage 3b  Type 2 diabetes mellitus  Diverticulitis  Dupuytren contracture  GERD  Prostatitis  Hyperlipidemia  Hypertension  Depression  Anxiety  Kidney stone  BPH without LUTS  Urethral stricture    Medications   Citalopram  Hydrochlorothiazide  Lisinopril  Metformin  Omeprazole  Rosuvastatin    Surgical History   Appendectomy  Vasectomy  T&A  Cataract removal with IOL implant, bilateral  Hernia repair  Prostate surgery  Coronary ablation for SVT  ESWL  Retinal detachment repair  Left inguinal hernia repair    Physical Exam     Patient Vitals for the past 24 hrs:   BP Temp Temp src Pulse Resp SpO2 Height Weight   08/16/24 1533 134/55 -- -- 60 -- 98 % -- --   08/16/24 1527 90/64 98.1  F (36.7  C) Oral 61 16 100 % -- --   08/16/24 1355 125/66 98.1  F (36.7  C) Oral 68 16 100 % -- --  "  08/16/24 1205 120/49 -- -- 71 13 -- -- --   08/16/24 1150 -- -- -- 74 12 -- -- --   08/16/24 1135 129/49 -- -- 70 20 -- -- --   08/16/24 1121 -- -- -- 70 21 -- -- --   08/16/24 1106 -- -- -- 82 10 -- -- --   08/16/24 1051 -- -- -- 84 24 97 % -- --   08/16/24 0949 -- -- -- 87 20 96 % -- --   08/16/24 0948 -- -- -- 86 23 96 % -- --   08/16/24 0947 -- -- -- 87 25 96 % -- --   08/16/24 0945 -- -- -- 91 26 96 % -- --   08/16/24 0944 -- -- -- 87 19 94 % -- --   08/16/24 0943 -- -- -- 86 10 94 % -- --   08/16/24 0941 -- -- -- 87 21 95 % -- --   08/16/24 0924 131/50 -- -- 93 25 98 % -- --   08/16/24 0915 -- -- -- 92 29 97 % -- --   08/16/24 0913 -- -- -- 93 13 97 % -- --   08/16/24 0907 (!) 150/70 -- -- -- -- -- -- --   08/16/24 0906 -- 99.2  F (37.3  C) Oral -- 18 -- 1.727 m (5' 8\") 65.8 kg (145 lb)   08/16/24 0905 -- 99.2  F (37.3  C) Oral 101 18 98 % -- --     Physical Exam  General: Ill appearing man lying in bed  HEENT: Atraumatic   EOM normal   External ears normal   Trachea midline  Neck: Supple, normal ROM  CV: Regular rate, regular rhythm   No lower extremity edema  2+ radial and DP pulses  PULM: Breath sounds normal bilaterally  No wheezes or rales  ABD: Soft, non-tender, non-distended  Normal bowel sounds   No rebound or guarding   MSK: No gross deformities  Tenderness over bilateral knees  Mild redness left lower extremity.  Compartments are soft.  Able to flex and extend left ankle without significant pain.  NEURO: Alert, no focal deficits. Oriented x3. CN 2-12 intact. Equal strength bilateral upper and lower extremities  Skin: Left lower extremity with mild erythema and warmth tracking up inner thigh  Shallow based scrapes bilateral knees without drainage or erythema       Diagnostics     Lab Results   Labs Ordered and Resulted from Time of ED Arrival to Time of ED Departure   COMPREHENSIVE METABOLIC PANEL - Abnormal       Result Value    Sodium 140      Potassium 4.1      Carbon Dioxide (CO2) 25      Anion " Gap 17 (*)     Urea Nitrogen 27.9 (*)     Creatinine 1.66 (*)     GFR Estimate 41 (*)     Calcium 10.1      Chloride 98      Glucose 237 (*)     Alkaline Phosphatase 70      AST 19      ALT 11      Protein Total 7.1      Albumin 4.4      Bilirubin Total 0.6     CBC WITH PLATELETS AND DIFFERENTIAL - Abnormal    WBC Count 15.8 (*)     RBC Count 4.59      Hemoglobin 14.6      Hematocrit 42.2      MCV 92      MCH 31.8      MCHC 34.6      RDW 12.6      Platelet Count 227      % Neutrophils 88      % Lymphocytes 3      % Monocytes 8      % Eosinophils 0      % Basophils 0      % Immature Granulocytes 0      NRBCs per 100 WBC 0      Absolute Neutrophils 13.9 (*)     Absolute Lymphocytes 0.5 (*)     Absolute Monocytes 1.3      Absolute Eosinophils 0.0      Absolute Basophils 0.1      Absolute Immature Granulocytes 0.1      Absolute NRBCs 0.0     LACTIC ACID WHOLE BLOOD - Abnormal    Lactic Acid 4.2 (*)    ROUTINE UA WITH MICROSCOPIC REFLEX TO CULTURE - Abnormal    Color Urine Light Yellow      Appearance Urine Clear      Glucose Urine >=1000 (*)     Bilirubin Urine Negative      Ketones Urine Negative      Specific Gravity Urine 1.019      Blood Urine Large (*)     pH Urine 7.5 (*)     Protein Albumin Urine 50 (*)     Urobilinogen Urine Normal      Nitrite Urine Negative      Leukocyte Esterase Urine Negative      Mucus Urine Present (*)     RBC Urine 73 (*)     WBC Urine 1      Squamous Epithelials Urine <1     CK TOTAL - Abnormal     (*)    KETONE BETA-HYDROXYBUTYRATE QUANTITATIVE, RAPID - Abnormal    Ketone (Beta-Hydroxybutyrate) Quantitative 0.58 (*)    INFLUENZA A/B, RSV, & SARS-COV2 PCR - Normal    Influenza A PCR Negative      Influenza B PCR Negative      RSV PCR Negative      SARS CoV2 PCR Negative     BLOOD CULTURE       Imaging   US Lower Extremity Venous Duplex Bilateral   Final Result   IMPRESSION: Negative for deep venous thrombosis throughout both lower   extremities.      CHELSEA LOZADA MD             SYSTEM ID:  [id number]      Chest XR,  PA & LAT   Final Result   IMPRESSION: There are no acute infiltrates. The cardiac silhouette is   not enlarged. Pulmonary vasculature is unremarkable.      FEMI DUVALL MD            SYSTEM ID:  [id number]      XR Knee Bilateral 3 Views   Final Result   IMPRESSION: Mild narrowing of the patellofemoral compartment on the   right. Remaining joint spaces are preserved and normally aligned.   Chondrocalcinosis of both knees. No acute fracture or joint effusion.          ARIAS GONZALES DO            SYSTEM ID:  [id number]      Abd/pelvis CT no contrast - Stone Protocol   Final Result   IMPRESSION:    1.  Nonobstructing stone on the left measuring 6 mm.   2.  No ureteral stones or hydronephrosis bilaterally.      FEMI DUVALL MD            SYSTEM ID:  [id number]      Head CT w/o contrast   Final Result   IMPRESSION: Negative for acute intracranial hemorrhage, hydrocephalus   or transcortical infarct. No skull fracture.      GEOFFREY KUMAR DO            SYSTEM ID:  [id number]          Independent Interpretation   CXR: No infiltrate.    ED Course      Medications Administered   Medications   hydrALAZINE (APRESOLINE) injection 10 mg (has no administration in time range)   lidocaine 1 % 0.1-1 mL (has no administration in time range)   lidocaine (LMX4) cream (has no administration in time range)   sodium chloride (PF) 0.9% PF flush 3 mL (3 mLs Intracatheter Not Given 8/16/24 1417)   sodium chloride (PF) 0.9% PF flush 3 mL (has no administration in time range)   senna-docusate (SENOKOT-S/PERICOLACE) 8.6-50 MG per tablet 1 tablet (has no administration in time range)     Or   senna-docusate (SENOKOT-S/PERICOLACE) 8.6-50 MG per tablet 2 tablet (has no administration in time range)   calcium carbonate (TUMS) chewable tablet 1,000 mg (has no administration in time range)   glucose gel 15-30 g (has no administration in time range)     Or   dextrose 50 % injection 25-50 mL (has no administration in  time range)     Or   glucagon injection 1 mg (has no administration in time range)   heparin ANTICOAGULANT injection 5,000 Units (5,000 Units Subcutaneous $Given 8/16/24 1426)   sodium chloride 0.9 % infusion ( Intravenous $New Bag 8/16/24 1425)   senna-docusate (SENOKOT-S/PERICOLACE) 8.6-50 MG per tablet 1 tablet (has no administration in time range)     Or   senna-docusate (SENOKOT-S/PERICOLACE) 8.6-50 MG per tablet 2 tablet (has no administration in time range)   insulin aspart (NovoLOG) injection (RAPID ACTING) ( Subcutaneous Not Given 8/16/24 1603)   insulin aspart (NovoLOG) injection (RAPID ACTING) (has no administration in time range)   piperacillin-tazobactam (ZOSYN) 3.375 g vial to attach to  mL bag (3.375 g Intravenous $New Bag 8/16/24 1704)   vancomycin (VANCOCIN) 750 mg in sodium chloride 0.9 % 250 mL intermittent infusion (has no administration in time range)   citalopram (celeXA) tablet 30 mg (30 mg Oral $Given 8/16/24 1703)   hydroCHLOROthiazide tablet 12.5 mg ( Oral Automatically Held 8/19/24 0900)   lisinopril (ZESTRIL) tablet 40 mg (has no administration in time range)   pantoprazole (PROTONIX) EC tablet 40 mg (has no administration in time range)   rosuvastatin (CRESTOR) tablet 40 mg (has no administration in time range)   sodium chloride 0.9% BOLUS 1,000 mL (0 mLs Intravenous Stopped 8/16/24 1154)   acetaminophen (TYLENOL) tablet 1,000 mg (1,000 mg Oral $Given 8/16/24 0943)   sodium chloride 0.9% BOLUS 1,000 mL (0 mLs Intravenous Stopped 8/16/24 1154)   piperacillin-tazobactam (ZOSYN) 4.5 g vial to attach to  mL bag (0 g Intravenous Stopped 8/16/24 1225)   vancomycin (VANCOCIN) 1,750 mg in 0.9% NaCl 500 mL intermittent infusion (1,750 mg Intravenous $New Bag 8/16/24 1225)       Procedures   Procedures     Discussion of Management   See below    ED Course   ED Course as of 08/16/24 1751   Fri Aug 16, 2024   0944 I obtained history and examined the patient as noted above.    1220 I  "spoke with Dr. Esposito of the hospitalist team regarding the patient, who accepted the patient for admission.    1450 HR improved. MAP stable       Additional Documentation  None    Medical Decision Making / Diagnosis     CMS Diagnoses:     Sepsis ED evaluation   The patient has signs of severe sepsis as evidenced by:  1. Presence of Sepsis, AND  2. Lactic Acidosis with value greater than or equal to 4       Time severe sepsis diagnosis confirmed = 0935  08/16/24   as this was the time when Lactate was resulted and the level was greater than or equal to 4    3 Hour Septic Shock Bundle Completion:  1. Initial Lactic Acid Result:   Recent Labs   Lab Test 08/16/24  1402 08/16/24  0935 02/18/18  2140   LACT 1.9 4.2* 1.4     2. Blood Cultures before Antibiotics: Yes  Note: Due to a national blood culture bottle shortage, reduced blood cultures may have been drawn on this patient.  3. Broad Spectrum Antibiotics Administered:  yes       Anti-infectives (From admission through now)      Start     Dose/Rate Route Frequency Ordered Stop    08/16/24 1140  vancomycin (VANCOCIN) 1,750 mg in 0.9% NaCl 500 mL intermittent infusion         1,750 mg  250 mL/hr over 2 Hours Intravenous ONCE 08/16/24 1135 08/16/24 1425    08/16/24 1130  piperacillin-tazobactam (ZOSYN) 4.5 g vial to attach to  mL bag        Note to Pharmacy: For [facility], [facility] and [facility]: For Zosyn-naive patients, use the \"Zosyn initial dose + extended infusion\" order panel.    4.5 g  over 30 Minutes Intravenous ONCE 08/16/24 1129 08/16/24 1225            4. IF 30 mL/kg bolus criteria met based on:  -Lactate > 4  OR  -Initial Hypotension:  Definition:  2 low BP readings (SBP <90, MAP <65, or decrease > 40 from baseline due to infection) within 3 hrs of each other during the time period of 6 hrs before and 3 hrs  after time zero  THEN: Fluid volume administered in ED:  Full 30 mL/kg bolus given (see amount below).    BMI Readings from Last 1 Encounters:   08/16/24 22.05 " kg/m      30 mL/kg fluids based on weight: 1,970 mL  30 mL/kg fluids based on IBW (must be >= 60 inches tall): 2,050 mL  I attest to having performed a repeat sepsis exam and assessment of perfusion at 1450 and the results demonstrate improved perfusion.           MIPS       None    [facility]   Michael Howard is a 83 year old male who presents with generalized weakness not tumble out of bed.  He is ill-appearing but neurologic exam is reassuring.  CT head and neck are without acute findings.  He has some shallow wounds on his legs that look like drug burn from him crawling to the phone at home.  Knee x-rays are unremarkable.  He has a marked leukocytosis with left shift and a lactic acidosis greater than 4 meeting criteria for severe sepsis.  I am not sure at this time what is causing his severe sepsis.  He has what looks like a mild cellulitis of the left lower extremity.  Will get 1 set of blood cultures based on current blood culture bottle shortages.  Will start on broad-spectrum antibiotics.  Received 30 cc/kg bolus fluids equaling total of 2 L.  Had low-grade oral fevers 99.2, tachycardia 101 on arrival, vitally improving after resuscitation.  Admitted to hospitalist above.    Disposition   The patient was admitted to the hospital.     Diagnosis     ICD-10-CM    1. Generalized weakness  R53.1 Primary Care - Care Coordination Referral      2. Severe sepsis (H)  A41.9     R65.20       3. Cellulitis of left lower extremity  L03.116 Primary Care - Care Coordination Referral           Scribe Disclosure:  I, Philomena Vallejo, am serving as a scribe at 9:51 AM on 8/16/2024 to document services personally performed by Kaelyn Cain DO based on my observations and the provider's statements to me.        Kaelyn Cain DO  08/16/24 9848

## 2024-08-16 NOTE — ED NOTES
[facility]  ED Nurse Handoff Report    ED Chief complaint: Generalized Weakness      ED Diagnosis:   Final diagnoses:   None       Code Status: Full Code    Allergies:   Allergies   Allergen Reactions    Ciprofibrate GI Disturbance     with abd pain    Ciprofloxacin      Gi bleed       Patient Story:   Pt presents with generalized weakness upon waking up this am. Pt found that he was incontinent of urine, which is unusual for him.     Focused Assessment:    Neuro: Alert, oriented x 4  Respiratory:Clear lung sounds, on room air   Cardiology:  NS   Gastrointestinal: soft, non tender, non distended   Genitourinary/Renal:    Musculoskeletal: moves all extremities   Skin: Intact skin   Lines: 18 right AC    Labs Ordered and Resulted from Time of ED Arrival to Time of ED Departure   COMPREHENSIVE METABOLIC PANEL - Abnormal       Result Value    Sodium 140      Potassium 4.1      Carbon Dioxide (CO2) 25      Anion Gap 17 (*)     Urea Nitrogen 27.9 (*)     Creatinine 1.66 (*)     GFR Estimate 41 (*)     Calcium 10.1      Chloride 98      Glucose 237 (*)     Alkaline Phosphatase 70      AST 19      ALT 11      Protein Total 7.1      Albumin 4.4      Bilirubin Total 0.6     CBC WITH PLATELETS AND DIFFERENTIAL - Abnormal    WBC Count 15.8 (*)     RBC Count 4.59      Hemoglobin 14.6      Hematocrit 42.2      MCV 92      MCH 31.8      MCHC 34.6      RDW 12.6      Platelet Count 227      % Neutrophils 88      % Lymphocytes 3      % Monocytes 8      % Eosinophils 0      % Basophils 0      % Immature Granulocytes 0      NRBCs per 100 WBC 0      Absolute Neutrophils 13.9 (*)     Absolute Lymphocytes 0.5 (*)     Absolute Monocytes 1.3      Absolute Eosinophils 0.0      Absolute Basophils 0.1      Absolute Immature Granulocytes 0.1      Absolute NRBCs 0.0     LACTIC ACID WHOLE BLOOD - Abnormal    Lactic Acid 4.2 (*)    ROUTINE UA WITH MICROSCOPIC REFLEX TO CULTURE - Abnormal    Color Urine Light Yellow      Appearance  Urine Clear      Glucose Urine >=1000 (*)     Bilirubin Urine Negative      Ketones Urine Negative      Specific Gravity Urine 1.019      Blood Urine Large (*)     pH Urine 7.5 (*)     Protein Albumin Urine 50 (*)     Urobilinogen Urine Normal      Nitrite Urine Negative      Leukocyte Esterase Urine Negative      Mucus Urine Present (*)     RBC Urine 73 (*)     WBC Urine 1      Squamous Epithelials Urine <1     CK TOTAL - Abnormal     (*)    INFLUENZA A/B, RSV, & SARS-COV2 PCR - Normal    Influenza A PCR Negative      Influenza B PCR Negative      RSV PCR Negative      SARS CoV2 PCR Negative     BLOOD CULTURE        XR Knee Bilateral 3 Views   Final Result   IMPRESSION: Mild narrowing of the patellofemoral compartment on the   right. Remaining joint spaces are preserved and normally aligned.   Chondrocalcinosis of both knees. No acute fracture or joint effusion.          ARIAS GONZALES DO            SYSTEM ID:  [id number]      Head CT w/o contrast   Final Result   IMPRESSION: Negative for acute intracranial hemorrhage, hydrocephalus   or transcortical infarct. No skull fracture.      GEOFFREY KUMAR DO            SYSTEM ID:  [id number]      Abd/pelvis CT no contrast - Stone Protocol    (Results Pending)   Chest XR,  PA & LAT    (Results Pending)         Treatments and/or interventions provided:      Medications   sodium chloride 0.9% BOLUS 1,000 mL (1,000 mLs Intravenous $New Bag 8/16/24 0934)   acetaminophen (TYLENOL) tablet 1,000 mg (1,000 mg Oral $Given 8/16/24 0943)   sodium chloride 0.9% BOLUS 1,000 mL (1,000 mLs Intravenous $New Bag 8/16/24 1043)        Patient's response to treatments and/or interventions:   Resting comfortably    To be done/followed up on inpatient unit:    See any in-patient orders    Does this patient have any cognitive concerns?:  none    Activity level - Baseline/Home:    Independent    Activity Level - Current:     Independent    Patient's Preferred language: English      Needed?: No    Isolation: None  Infection: Not Applicable  Patient tested for COVID 19 prior to admission: YES    Bariatric?: No    Vital Signs:   Vitals:    08/16/24 0949 08/16/24 1051 08/16/24 1106 08/16/24 1121   BP:       Pulse: 87 84 82 70   Resp: 20 24 10 21   Temp:       TempSrc:       SpO2: 96% 97%     Weight:       Height:           Cardiac Rhythm:Cardiac Rhythm: Normal sinus rhythm    Was the PSS-3 completed:   Yes    Family Comments: none present in the ER    For the majority of the shift this patient's behavior was Green.   Behavioral interventions performed were     ED NURSE PHONE NUMBER: *[phone]

## 2024-08-16 NOTE — ED NOTES
Bed: ED20  Expected date:   Expected time:   Means of arrival:   Comments:  419 83M weakness, unable to stand ETA 0850

## 2024-08-16 NOTE — ED TRIAGE NOTES
Pt presents via stretcher transported by EMS for complaints of generalized weakness this am upon waking up.  Pt was incontinent of urine this am, which usual for him. Pt was too weak to get up from his bed, slid of his bed to the carpeted floor. Pt denies hitting his head.      Triage Assessment (Adult)       Row Name 08/16/24 0900          Triage Assessment    Airway WDL WDL        Respiratory WDL    Respiratory WDL WDL        Skin Circulation/Temperature WDL    Skin Circulation/Temperature WDL WDL        Cardiac WDL    Cardiac WDL WDL        Peripheral/Neurovascular WDL    Peripheral Neurovascular WDL WDL        Cognitive/Neuro/Behavioral WDL    Cognitive/Neuro/Behavioral WDL WDL

## 2024-08-16 NOTE — PLAN OF CARE
Goal Outcome Evaluation:      Plan of Care Reviewed With: patient    Overall Patient Progress: improvingOverall Patient Progress: improving     A&O x 4. VSS, RA. CMS intact. Denies pain. Voiding in urinal. Skin intact, ex abrasion on BL knee. IV infusing 75 ml/hr. Not out of bed yet.

## 2024-08-16 NOTE — PHARMACY-ADMISSION MEDICATION HISTORY
"Pharmacist Admission Medication History    Admission medication history is complete. The information provided in this note is only as accurate as the sources available at the time of the update.    Information Source(s): Patient and CareEverywhere/[employer]Scripts via in-person    Pertinent Information:   -metformin: last filled 4/23/24 for #90 per [employer], pt reports still has/taking  -desonide: no [employer] records, patient confirms it is a 0.05% cream    Changes made to PTA medication list:  Added: acetaminophen, nonformulary \"Tylenol PM\", Jardiance  Deleted: sildenafil  Changed: citalopram (20 mg daily --> 30 mg daily), desonide (BID PRN --> ~every other day), ipratropium nasal (q12h --> BID), Preservision (tab --> cap), rosuvastatin (daily --> at bedtime)    Allergies reviewed with patient and updates made in EHR: no, already reviewed    Medication History Completed By: Luz Fountain RPH 8/16/2024 2:17 PM    Prior to Admission medications    Medication Sig Last Dose Taking? Auth Provider Long Term End Date   ACETAMINOPHEN PO Take 1 tablet by mouth as needed for other (headache)  at PRN Yes Unknown, Entered By History     citalopram (CELEXA) 20 MG tablet Take 30 mg by mouth daily 8/15/2024 Yes Alda José PA-C Yes    desonide (DESOWEN) 0.05 % external cream ~every other day  Yes Alda José PA-C     empagliflozin (JARDIANCE) 10 MG TABS tablet Take 10 mg by mouth daily 8/15/2024 Yes Unknown, Entered By History     Ferrous Gluconate 324 (37.5 Fe) MG TABS Take 1 tablet by mouth 2 times daily 8/15/2024 Yes Alda José PA-C     hydrochlorothiazide (HYDRODIURIL) 12.5 MG tablet Take 12.5 mg by mouth daily 8/15/2024 Yes Alda José PA-C No    ipratropium (ATROVENT) 0.03 % nasal spray Spray 2 sprays into both nostrils 2 times daily 8/15/2024 Yes Michael Liu MD     lisinopril (ZESTRIL) 40 MG tablet Take 40 mg by mouth daily 8/15/2024 Yes Alda José PA-C Yes    metFORMIN " "(GLUCOPHAGE) 500 MG tablet Take 500 mg by mouth 2 times daily (with meals) 8/15/2024 Yes Alda José PA-C Yes    Multiple Vitamins-Minerals (PRESERVISION AREDS 2 PO) Take 1 capsule by mouth 2 times daily 8/15/2024 Yes Alda José PA-C     NONFORMULARY \"Tylenol PM\" - 1 tab QHS  Yes Unknown, Entered By History     omeprazole (PRILOSEC) 40 MG DR capsule Take 40 mg by mouth every morning 8/15/2024 Yes Alda José PA-C     rosuvastatin (CRESTOR) 40 MG tablet Take 40 mg by mouth at bedtime 8/15/2024 Yes Alda José PA-C Yes    Vitamin D (Cholecalciferol) 50 MCG (2000 UT) CAPS Take 1 capsule by mouth daily 8/15/2024 Yes Alda José PA-C     blood glucose monitoring (ONE TOUCH ULTRASOFT) lancets Check blood sugars once daily as directed   Alda José PA-C     ONETOUCH ULTRA test strip daily Check blood sugars once daily as directed   Alda José PA-C           "

## 2024-08-16 NOTE — H&P
[facility]  History and Physical   Hospitalist  Bolivar Esposito MD       Michael Howard MRN# [id number]   YOB: 1940 Age: 83 year old      Date of Admission:  8/16/2024         Assessment and Plan:   Michael Howard is a 83 year old male with PMH significant for diabetes, hypertension, dyslipidemia, GERD, CKD stage III, BPH, depression who presented to ED with generalized weakness and fall, noted with severe sepsis with unclear etiology, admitted inpatient 8/16/24.    Patient lives alone at home and has been feeling very weak since night prior to admission, denied any fever, no chest pain or shortness of breath, does report of some poor urinary stream but no increased frequency. He slid out of bed and was unable to get up and was brought to ED by EMS.    Generalized weakness  severe sepsis with unclear etiology  likely left lower extremity cellulitis  lactic acidosis  -on initial evaluation afebrile with stable vitals; leukocytosis with WBC 15.8, elevated , UA unremarkable  -lactic acid markedly elevated at 4.2  -COVID/influenza/RSV negative  -chest x-ray with no acute infiltrates or effusion  -CT abdomen pelvis noted 6 mm left non-obstructive urine stone, no hydronephrosis  -CT head unremarkable    -Unclear etiology for severe sepsis  -some concern for likely left lower extremity cellulitis; ultrasound lower extremity is pending  -was given vancomycin and Zosyn in ER; will continue with empiric antibiotics given concern for severe sepsis  -follow blood cultures; will add on Pro calcitonin; repeat lactic acid 2 hours following fluid bolus  -was given 2 L fluid bolus in ED, will continue NS at 75 ml/hrX 24 hrs  -fall precautions  -will get PT/OT evaluation and  for disposition planning    diabetes mellitus  -PTA on metformin, Jardiance  -will hold off on PTA oral hypoglycemics  -high resistance sliding scale insulin  -hypoglycemia  protocol    Hypertension  Dyslipidemia  -PTA on HCTZ and lisinopril  -will hold off on HCTZ and continue PTA lisinopril with hold parameters  -hydralazine IV PRN for SBP> 180  -resume PTA Crestor when verified by pharmacy    CKD stage III  -no recent labs to compare with; past labs note baseline creatinine around 1.5 to 2  -on admission creatinine 1.66, appears around baseline  -will monitor BMP    GERD  -resume PTA Prilosec when verified by pharmacy    Depression  -resume PTA Celexa when verified    Clinically Significant Risk Factors Present on Admission                    # Hypertension: Home medication list includes antihypertensive(s)                     DVT prophylaxis: subcutaneous heparin  Code status: full code    Care plan discussed with ED provider and patient.           Primary Care Physician:   Alda José [phone]         Chief Complaint:     Generalized weakness    History is obtained from the patient         History of Present Illness:     Michael Howard is a 83 year old male with PMH significant for diabetes, hypertension, dyslipidemia, GERD, CKD stage III, BPH, depression who presented to ED with generalized weakness and fall, noted with severe sepsis with unclear etiology, admitted inpatient 8/16/24.    Patient lives alone at home and has been feeling very weak since night prior to admission, denied any fever, no chest pain or shortness of breath, does report of some poor urinary stream but no increased frequency. He slid out of bed and was unable to get up and was brought to ED by EMS.    In the ED he was seen by Dr John. On initial evaluation afebrile with stable vitals; leukocytosis with WBC 15.8, elevated , UA unremarkable  -lactic acid markedly elevated at 4.2  -COVID/influenza/RSV negative  -chest x-ray with no acute infiltrates or effusion  -CT abdomen pelvis noted 6 mm left non-obstructive urine stone, no hydronephrosis  -CT head unremarkable  -was given vancomycin and  Zosyn in ER along with 2 L fluid bolus and hospitalist was requested admission for further evaluation.    The patient denies any fever, chills, rigors, chest pain or shortness of breath.  Denies pain abdomen.  No bowel or bladder disturbances.           Past Medical History:     Diabetes  Hypertension  Dyslipidemia  GERD  CKD stage III  BPH  Depression           Past Surgical History:     Past Surgical History:   Procedure Laterality Date    APPENDECTOMY      CARDIAC SURGERY      coronary ablation for SVT    COLONOSCOPY      CYSTOSCOPY  05/15/2019    Dr Ross     CYSTOSCOPY, TRANSURETHRAL RESECTION (TUR) PROSTATE, COMBINED N/A 2/3/2016    Procedure: COMBINED CYSTOSCOPY, TRANSURETHRAL RESECTION (TUR) PROSTATE;  Surgeon: Giovanny Ross MD;  Location:  OR    ENT SURGERY      nasal polyps, tonsilectomy    EXTRACORPOREAL SHOCK WAVE LITHOTRIPSY (ESWL)  10/9/2013    Procedure: EXTRACORPOREAL SHOCK WAVE LITHOTRIPSY (ESWL);  LEFT EXTRACORPOREAL SHOCK WAVE LITHOTRIPSY (ESWL) ;  Surgeon: Giovanny Ross MD;  Location:  OR    EYE SURGERY      retinal detachment repair    HERNIA REPAIR      L ing hernia repair    LAPAROSCOPIC HERNIORRHAPHY INGUINAL Right 11/10/2016    Procedure: LAPAROSCOPIC HERNIORRHAPHY INGUINAL;  Surgeon: Sebastian Hunt MD;  Location:  OR    PHACOEMULSIFICATION CLEAR CORNEA WITH STANDARD INTRAOCULAR LENS IMPLANT  4/3/2014    Procedure: PHACOEMULSIFICATION CLEAR CORNEA WITH STANDARD INTRAOCULAR LENS IMPLANT;  LEFT PHACOEMULSIFICATION CLEAR CORNEA WITH STANDARD INTRAOCULAR LENS IMPLANT ;  Surgeon: Keanu Pollock MD;  Location:  [facility]    PHACOEMULSIFICATION CLEAR CORNEA WITH STANDARD INTRAOCULAR LENS IMPLANT Right 9/8/2016    Procedure: PHACOEMULSIFICATION CLEAR CORNEA WITH STANDARD INTRAOCULAR LENS IMPLANT;  Surgeon: Keanu Pollock MD;  Location:  [facility]    VASECTOMY                Home Medications:     Prior to Admission Medications   Prescriptions Last Dose Informant Patient Reported?  Taking?   Ferrous Gluconate 324 (37.5 Fe) MG TABS   Yes No   Sig: Take 1 tablet by mouth 2 times daily   Multiple Vitamins-Minerals (PRESERVISION AREDS 2 PO)   Yes No   Sig: Take 1 tablet by mouth 2 times daily   ONETOUCH ULTRA test strip   Yes No   Sig: daily Check blood sugars once daily as directed   Vitamin D (Cholecalciferol) 50 MCG (2000 UT) CAPS   Yes No   Sig: Take 1 capsule by mouth daily   blood glucose monitoring (ONE TOUCH ULTRASOFT) lancets   Yes No   Sig: Check blood sugars once daily as directed   citalopram (CELEXA) 20 MG tablet   Yes No   Sig: Take 20 mg by mouth daily   desonide (DESOWEN) 0.05 % external cream   Yes No   Sig: Apply topically 2 times daily as needed   hydrochlorothiazide (HYDRODIURIL) 12.5 MG tablet   Yes No   Sig: Take 12.5 mg by mouth daily   ipratropium (ATROVENT) 0.03 % nasal spray   Yes No   Sig: Spray 2 sprays into both nostrils every 12 hours   lisinopril (ZESTRIL) 40 MG tablet   Yes No   Sig: Take 40 mg by mouth daily   metFORMIN (GLUCOPHAGE) 500 MG tablet   Yes No   Sig: Take 500 mg by mouth 2 times daily (with meals)   omeprazole (PRILOSEC) 40 MG DR capsule   Yes No   Sig: Take 40 mg by mouth every morning   rosuvastatin (CRESTOR) 40 MG tablet   Yes No   Sig: Take 40 mg by mouth daily   sildenafil (VIAGRA) 25 MG tablet   Yes No   Sig: Take 25-50 mg by mouth daily as needed (1 hour prior to sexual activity)      Facility-Administered Medications: None            Allergies:     Allergies   Allergen Reactions    Ciprofibrate GI Disturbance     with abd pain    Ciprofloxacin      Gi bleed            Social History:   Michael Howard  reports that he has never smoked. He has never used smokeless tobacco. He reports current alcohol use. He reports that he does not use drugs.              Family History:   Michael Howard family history is not on file.    Family history was reviewed by myself and not pertinent to current presentation.           Review of Systems:   A10 point  "Review of Systems was done and were negative other than noted in the HPI.             Physical Exam:   Blood pressure 120/49, pulse 71, temperature 99.2  F (37.3  C), temperature source Oral, resp. rate 13, height 1.727 m (5' 8\"), weight 65.8 kg (145 lb), SpO2 97%.  145 lbs 0 oz        Constitutional: Alert, awake and oriented X 3; lying comfortably in bed in no apparent distress but looks fatigued   HEENT: Pupils equal and reactive to light and accomodation, EOMI intact; neck supple no raised JVD or rigidity    Oral cavity: dry oral mucosa   Cardiovascular: Normal s1 s2, regular rate and rhythm, no murmur   Lungs: B/l clear to auscultation, no wheezes or crepitations   Abdomen: Soft, nt, nd, no guarding, rigidity or rebound; BS +   LE : No edema; noted left lower extremity erythema and some local warmth, no tenderness   Musculoskeletal: Power 5/5 in all extremities   Neuro: No focal neurological deficits noted, CN II to XII grossly intact   Psychiatry: normal mood and affect  Skin: likely left lower extremity cellulitis as above             Data:   All new lab and imaging data was reviewed in Epic.   Significant labs and imagings include:  CMP notable for BUN 27.9, creatinine 1.66  CK slightly elevated at 329  blood glucose 237, ketone slightly elevated 0.58, lactic acid 4.2, anion gap 17, bicarb 25  CBC with WBC 15.8, hemoglobin 14.6  UA mostly unremarkable with one WBC, negative leukocyte esterase  COVID/influenza/RSV negative  chest x-ray with no acute infiltrates or effusion  CT head unremarkable with no acute intracranial pathology  CT abdomen/pelvis noted non-obstructive 6 mm stone on left with no hydronephrosis and otherwise unremarkable                 Bolivar Esposito MD  Hospitalist   "

## 2024-08-17 ENCOUNTER — APPOINTMENT (OUTPATIENT)
Dept: PHYSICAL THERAPY | Facility: CLINIC | Age: 84
DRG: 872 | End: 2024-08-17
Attending: HOSPITALIST
Payer: COMMERCIAL

## 2024-08-17 LAB
ALBUMIN SERPL BCG-MCNC: 3.2 G/DL (ref 3.5–5.2)
ALP SERPL-CCNC: 51 U/L (ref 40–150)
ALT SERPL W P-5'-P-CCNC: 46 U/L (ref 0–70)
ANION GAP SERPL CALCULATED.3IONS-SCNC: 10 MMOL/L (ref 7–15)
AST SERPL W P-5'-P-CCNC: 159 U/L (ref 0–45)
BASOPHILS # BLD AUTO: 0 10E3/UL (ref 0–0.2)
BASOPHILS NFR BLD AUTO: 1 %
BILIRUB SERPL-MCNC: 0.5 MG/DL
BUN SERPL-MCNC: 20.8 MG/DL (ref 8–23)
CALCIUM SERPL-MCNC: 9 MG/DL (ref 8.8–10.4)
CHLORIDE SERPL-SCNC: 108 MMOL/L (ref 98–107)
CREAT SERPL-MCNC: 1.52 MG/DL (ref 0.67–1.17)
EGFRCR SERPLBLD CKD-EPI 2021: 46 ML/MIN/1.73M2
EOSINOPHIL # BLD AUTO: 0 10E3/UL (ref 0–0.7)
EOSINOPHIL NFR BLD AUTO: 0 %
ERYTHROCYTE [DISTWIDTH] IN BLOOD BY AUTOMATED COUNT: 12.9 % (ref 10–15)
GLUCOSE BLDC GLUCOMTR-MCNC: 100 MG/DL (ref 70–99)
GLUCOSE BLDC GLUCOMTR-MCNC: 114 MG/DL (ref 70–99)
GLUCOSE BLDC GLUCOMTR-MCNC: 190 MG/DL (ref 70–99)
GLUCOSE BLDC GLUCOMTR-MCNC: 195 MG/DL (ref 70–99)
GLUCOSE BLDC GLUCOMTR-MCNC: 228 MG/DL (ref 70–99)
GLUCOSE SERPL-MCNC: 96 MG/DL (ref 70–99)
HCO3 SERPL-SCNC: 24 MMOL/L (ref 22–29)
HCT VFR BLD AUTO: 34 % (ref 40–53)
HGB BLD-MCNC: 11.7 G/DL (ref 13.3–17.7)
IMM GRANULOCYTES # BLD: 0 10E3/UL
IMM GRANULOCYTES NFR BLD: 0 %
LYMPHOCYTES # BLD AUTO: 0.9 10E3/UL (ref 0.8–5.3)
LYMPHOCYTES NFR BLD AUTO: 11 %
MCH RBC QN AUTO: 31.3 PG (ref 26.5–33)
MCHC RBC AUTO-ENTMCNC: 34.4 G/DL (ref 31.5–36.5)
MCV RBC AUTO: 91 FL (ref 78–100)
MONOCYTES # BLD AUTO: 0.9 10E3/UL (ref 0–1.3)
MONOCYTES NFR BLD AUTO: 11 %
NEUTROPHILS # BLD AUTO: 6.6 10E3/UL (ref 1.6–8.3)
NEUTROPHILS NFR BLD AUTO: 77 %
NRBC # BLD AUTO: 0 10E3/UL
NRBC BLD AUTO-RTO: 0 /100
PLATELET # BLD AUTO: 173 10E3/UL (ref 150–450)
POTASSIUM SERPL-SCNC: 3.7 MMOL/L (ref 3.4–5.3)
PROT SERPL-MCNC: 5.8 G/DL (ref 6.4–8.3)
RBC # BLD AUTO: 3.74 10E6/UL (ref 4.4–5.9)
SODIUM SERPL-SCNC: 142 MMOL/L (ref 135–145)
WBC # BLD AUTO: 8.6 10E3/UL (ref 4–11)

## 2024-08-17 PROCEDURE — 120N000001 HC R&B MED SURG/OB

## 2024-08-17 PROCEDURE — 97116 GAIT TRAINING THERAPY: CPT | Mod: GP

## 2024-08-17 PROCEDURE — 258N000003 HC RX IP 258 OP 636: Performed by: HOSPITALIST

## 2024-08-17 PROCEDURE — 99233 SBSQ HOSP IP/OBS HIGH 50: CPT | Performed by: INTERNAL MEDICINE

## 2024-08-17 PROCEDURE — 250N000013 HC RX MED GY IP 250 OP 250 PS 637: Performed by: HOSPITALIST

## 2024-08-17 PROCEDURE — 250N000011 HC RX IP 250 OP 636: Performed by: HOSPITALIST

## 2024-08-17 PROCEDURE — 82565 ASSAY OF CREATININE: CPT | Performed by: HOSPITALIST

## 2024-08-17 PROCEDURE — 97161 PT EVAL LOW COMPLEX 20 MIN: CPT | Mod: GP

## 2024-08-17 PROCEDURE — 250N000012 HC RX MED GY IP 250 OP 636 PS 637: Performed by: HOSPITALIST

## 2024-08-17 PROCEDURE — 85025 COMPLETE CBC W/AUTO DIFF WBC: CPT | Performed by: HOSPITALIST

## 2024-08-17 PROCEDURE — 36415 COLL VENOUS BLD VENIPUNCTURE: CPT | Performed by: HOSPITALIST

## 2024-08-17 PROCEDURE — 97530 THERAPEUTIC ACTIVITIES: CPT | Mod: GP

## 2024-08-17 RX ADMIN — INSULIN ASPART 4 UNITS: 100 INJECTION, SOLUTION INTRAVENOUS; SUBCUTANEOUS at 17:18

## 2024-08-17 RX ADMIN — PIPERACILLIN AND TAZOBACTAM 3.38 G: 3; .375 INJECTION, POWDER, FOR SOLUTION INTRAVENOUS at 23:54

## 2024-08-17 RX ADMIN — VANCOMYCIN HYDROCHLORIDE 750 MG: 1 INJECTION, POWDER, LYOPHILIZED, FOR SOLUTION INTRAVENOUS at 12:12

## 2024-08-17 RX ADMIN — ROSUVASTATIN CALCIUM 40 MG: 20 TABLET, FILM COATED ORAL at 22:23

## 2024-08-17 RX ADMIN — LISINOPRIL 40 MG: 40 TABLET ORAL at 08:20

## 2024-08-17 RX ADMIN — HEPARIN SODIUM 5000 UNITS: 5000 INJECTION, SOLUTION INTRAVENOUS; SUBCUTANEOUS at 22:24

## 2024-08-17 RX ADMIN — PIPERACILLIN AND TAZOBACTAM 3.38 G: 3; .375 INJECTION, POWDER, FOR SOLUTION INTRAVENOUS at 05:15

## 2024-08-17 RX ADMIN — PIPERACILLIN AND TAZOBACTAM 3.38 G: 3; .375 INJECTION, POWDER, FOR SOLUTION INTRAVENOUS at 17:18

## 2024-08-17 RX ADMIN — SENNOSIDES AND DOCUSATE SODIUM 1 TABLET: 50; 8.6 TABLET ORAL at 22:23

## 2024-08-17 RX ADMIN — PIPERACILLIN AND TAZOBACTAM 3.38 G: 3; .375 INJECTION, POWDER, FOR SOLUTION INTRAVENOUS at 00:13

## 2024-08-17 RX ADMIN — PANTOPRAZOLE SODIUM 40 MG: 40 TABLET, DELAYED RELEASE ORAL at 08:20

## 2024-08-17 RX ADMIN — HEPARIN SODIUM 5000 UNITS: 5000 INJECTION, SOLUTION INTRAVENOUS; SUBCUTANEOUS at 14:39

## 2024-08-17 RX ADMIN — CITALOPRAM HYDROBROMIDE 30 MG: 10 TABLET ORAL at 08:20

## 2024-08-17 RX ADMIN — PANTOPRAZOLE SODIUM 40 MG: 40 TABLET, DELAYED RELEASE ORAL at 22:23

## 2024-08-17 RX ADMIN — SODIUM CHLORIDE: 9 INJECTION, SOLUTION INTRAVENOUS at 05:13

## 2024-08-17 RX ADMIN — INSULIN ASPART 3 UNITS: 100 INJECTION, SOLUTION INTRAVENOUS; SUBCUTANEOUS at 12:12

## 2024-08-17 RX ADMIN — HEPARIN SODIUM 5000 UNITS: 5000 INJECTION, SOLUTION INTRAVENOUS; SUBCUTANEOUS at 05:15

## 2024-08-17 RX ADMIN — PIPERACILLIN AND TAZOBACTAM 3.38 G: 3; .375 INJECTION, POWDER, FOR SOLUTION INTRAVENOUS at 11:19

## 2024-08-17 ASSESSMENT — ACTIVITIES OF DAILY LIVING (ADL)
ADLS_ACUITY_SCORE: 52
ADLS_ACUITY_SCORE: 50
ADLS_ACUITY_SCORE: 52
ADLS_ACUITY_SCORE: 50
ADLS_ACUITY_SCORE: 52
ADLS_ACUITY_SCORE: 50
ADLS_ACUITY_SCORE: 52
ADLS_ACUITY_SCORE: 52
ADLS_ACUITY_SCORE: 50
ADLS_ACUITY_SCORE: 52
ADLS_ACUITY_SCORE: 52
ADLS_ACUITY_SCORE: 50
ADLS_ACUITY_SCORE: 50

## 2024-08-17 NOTE — PROGRESS NOTES
"   08/17/24 1000   Appointment Info   Signing Clinician's Name / Credentials (PT) Carrie Carranza, PT, DPT   Living Environment   People in Home alone   Current Living Arrangements house  ([facility])   Home Accessibility no concerns;stairs within home  (Stairs upstairs, pt reports only living on main level)   Transportation Anticipated family or friend will provide   Living Environment Comments Pt lives in a house alone, reports having famly near by who check in on him. Stairs within the home to upstairs, pt reports living only on main level.   Self-Care   Usual Activity Tolerance good   Current Activity Tolerance moderate   Regular Exercise Yes   Activity/Exercise Type walking;biking  (Stationary biking)   Equipment Currently Used at Home none   Fall history within last six months no  (Pt slipped out of bed prior to admission, otherwise no falls)   Activity/Exercise/Self-Care Comment Pt is typically IND without an AD at baseline, reports having 4WW and fWW at home but does not use them at this time.   General Information   Onset of Illness/Injury or Date of Surgery 08/16/24   Referring Physician Bolivar Esposito MD   Patient/Family Therapy Goals Statement (PT) \"To go back home\"   Pertinent History of Current Problem (include personal factors and/or comorbidities that impact the POC) Pt is a 83 year old male with PMH significant for diabetes, hypertension, dyslipidemia, GERD, CKD stage III, BPH, depression who presented to ED with generalized weakness and fall, noted with severe sepsis with unclear etiology, admitted inpatient 8/16/24. Patient lives alone at home and has been feeling very weak since night prior to admission, denied any fever, no chest pain or shortness of breath, does report of some poor urinary stream but no increased frequency. He slid out of bed and was unable to get up and was brought to ED by EMS.   Existing Precautions/Restrictions fall   Weight-Bearing Status - LUE full weight-bearing "   Weight-Bearing Status - RUE full weight-bearing   Weight-Bearing Status - LLE full weight-bearing   Weight-Bearing Status - RLE full weight-bearing   Cognition   Affect/Mental Status (Cognition) WFL   Orientation Status (Cognition) oriented x 4   Follows Commands (Cognition) WFL   Pain Assessment   Patient Currently in Pain Yes, see Vital Sign flowsheet   Integumentary/Edema   Integumentary/Edema no deficits were identifed   Posture    Posture Forward head position;Protracted shoulders   Range of Motion (ROM)   Range of Motion ROM deficits secondary to pain;ROM deficits secondary to weakness   Strength (Manual Muscle Testing)   Strength (Manual Muscle Testing) Able to perform R SLR;Able to perform L SLR   Bed Mobility   Comment, (Bed Mobility) Supine>sitting EOB completed w/ SBA   Transfers   Comment, (Transfers) Sit>stand completed w/ CGA and FWW   Gait/Stairs (Locomotion)   Comment, (Gait/Stairs) Pt ambulated w/ SBA and FWW   Balance   Balance other (describe)   Balance Comments Pt required FWW for all dynamic balance at this time   Sensory Examination   Sensory Perception patient reports no sensory changes   Coordination   Coordination no deficits were identified   Muscle Tone   Muscle Tone no deficits were identified   Clinical Impression   Criteria for Skilled Therapeutic Intervention Yes, treatment indicated   PT Diagnosis (PT) Impaired functional mobility   Influenced by the following impairments Weakness, impaired activity tolerance, impaired balance   Functional limitations due to impairments Impaired gait and inability to complete ADL's   Clinical Presentation (PT Evaluation Complexity) stable   Clinical Presentation Rationale Clinical judgment   Clinical Decision Making (Complexity) low complexity   Planned Therapy Interventions (PT) balance training;bed mobility training;gait training;home exercise program;motor coordination training;neuromuscular re-education;patient/family education;postural  re-education;ROM (range of motion);strengthening;stretching;transfer training;risk factor education;progressive activity/exercise;home program guidelines   Risk & Benefits of therapy have been explained evaluation/treatment results reviewed;care plan/treatment goals reviewed;risks/benefits reviewed;current/potential barriers reviewed;participants included;participants voiced agreement with care plan;patient   PT Total Evaluation Time   PT Eval, Low Complexity Minutes (70193) 10   Physical Therapy Goals   PT Frequency Daily   PT Predicted Duration/Target Date for Goal Attainment 08/22/24   PT Goals Bed Mobility;Transfers;Gait   PT: Bed Mobility Independent;Supine to/from sit;Rolling;Bridging;Within precautions   PT: Transfers Modified independent;Sit to/from stand;Bed to/from chair;Assistive device   PT: Gait Modified independent;Rolling walker;Greater than 200 feet;Within precautions   Interventions   Interventions Quick Adds Gait Training;Therapeutic Activity   Therapeutic Activity   Therapeutic Activities: dynamic activities to improve functional performance Minutes (42247) 8   Treatment Detail/Skilled Intervention Evaluation completed and treatment indicated. Supine>sitting EOB completed w/ SBA, cues given for technique throughout. Sit>stand completed w/ CGA and FWW in front, initially pt had difficulty standing, cues given to push up frm bed rather than fWW and then pt able to complete w/ CGA. Post-ambulation, pt educated on discharge planning, walking program with nursing in hospital. Pt cued to reach back for chair armrests prior to descent. Pt left sitting up in recliner with all needs in reach and chair alarm on.   Gait Training   Gait Training Minutes (93062) 15   Symptoms Noted During/After Treatment (Gait Training) fatigue   Treatment Detail/Skilled Intervention Pt ambulated ~100 ft w/ FWW and CGA, progressed to SBA, cues given for walker proximity and upright posture. Walker height adjusted to proper  height. Pt then trialed ambulation without an AD for ~30 ft, cues given for increased step length as pt demonstrated antalgic gait and short steps, pt reported decreased pain in quads with FWW usage.   Distance in Feet ~100 ft + 30 ft   PT Discharge Planning   PT Plan Progress ambulation distance w/ FWW vs no AD, Repeated sit>stands, LE exercises   PT Discharge Recommendation (DC Rec) home with home care physical therapy;home   PT Rationale for DC Rec Pt is typically IND without an AD at basesline, lives alone in a [facility]. Pt reports having supportive family near by to check in on him. Pt is moving near baseline mobility, currently requiring SBA w/ FWW for all functioanl mobility. Anticipate with further IP P that pt will be safe to return home with further home care PT in order to further address deficits at home.   PT Brief overview of current status SBA w/ FWW   Total Session Time   Timed Code Treatment Minutes 23   Total Session Time (sum of timed and untimed services) 33

## 2024-08-17 NOTE — PROGRESS NOTES
[facility]    Medicine Progress Note - Hospitalist Service        Date of Admission:  8/16/2024  8:55 AM    Assessment & Plan:   Michael Howard is a 83 year old male with PMH significant for diabetes, hypertension, dyslipidemia, GERD, CKD stage III, BPH, depression who presented to ED with generalized weakness and fall, and was found to have sepsis with unclear etiology, admitted inpatient 8/16/24.     Generalized weakness  Severe sepsis   Likely left lower extremity cellulitis  Lactic acidosis  -Presented with generalized weakness, no fever.  No significant localizing signs or symptoms other than possible left lower extremity cellulitis  -on initial evaluation afebrile with stable vitals; leukocytosis with WBC 15.8, elevated , UA unremarkable  -lactic acid markedly elevated at 4.2, which improved to normal with IV hydration and antibiotics  -COVID/influenza/RSV negative  -chest x-ray with no acute infiltrates or effusion  -CT abdomen pelvis noted 6 mm left non-obstructive urine stone, no hydronephrosis  -CT head unremarkable  -Exact etiology for presentation unclear, could be due to left lower extremity cellulitis  -Await blood cultures, negative thus far  -Calcitonin is very minimally elevated at 0.47  -Continue empiric Zosyn.  -some concern for likely left lower extremity cellulitis; ultrasound lower extremity is pending  -was given vancomycin and Zosyn in ER; will continue with empiric antibiotics given concern for severe sepsis  -follow blood cultures; will add on Pro calcitonin; repeat lactic acid 2 hours following fluid bolus  -was given 2 L fluid bolus in ED, will continue NS at 75 ml/hrX 24 hrs  -fall precautions  -will get PT/OT evaluation and  for disposition planning     Diabetes mellitus-2  -PTA on metformin, Jardiance  -will hold off on PTA oral hypoglycemics  -Initial lactic acid could be partially related to PTA metformin  -high resistance sliding scale  insulin  -hypoglycemia protocol     Hypertension  Dyslipidemia  -PTA on HCTZ and lisinopril  -Hold HCTZ.  Continue PTA lisinopril with hold parameters  -hydralazine IV PRN for SBP> 180     CKD stage III  -no recent labs to compare with; past labs note baseline creatinine around 1.5 to 2  -on admission creatinine 1.66, appears around baseline  -Creatinine stable at 1.52 this morning     GERD  -Prior to admission Prilosec     Depression  -resume PTA Celexa         Diet: Combination Diet Regular Diet Adult     DVT Prophylaxis: Heparin SQ   Zuniga Catheter: Not present  Code Status: Full Code     Disposition Plan      Expected Discharge Date: 08/18/2024              Entered: James Calderon MD 08/17/2024, 9:15 AM        Medically Ready for Discharge: Anticipated in 2-4 Days       Clinically Significant Risk Factors Present on Admission           # Hypercalcemia: corrected calcium is >10.1, will monitor as appropriate    # Hypoalbuminemia: Lowest albumin = 3.2 g/dL at 8/17/2024  7:34 AM, will monitor as appropriate     # Hypertension: Home medication list includes antihypertensive(s)                   The patient's care was discussed with the Bedside Nurse and Patient.    Medical Decision Making       **CLEAR ALL SELECTIONS**      Labs/Imaging Reviewed:  See Information above and Data section below  Time SPENT BY ME on the date of service doing chart review, history, exam, documentation & further activities per the note:  50 MINUTES    Chart documentation was completed, in part, with [employer] voice-recognition software. Even though reviewed, some grammatical, spelling, and word errors may remain.    James Calderon MD  Hospitalist Service  [facility]  Text Page 7AM-6PM  Securely message with the Vocera Web Console (learn more here)  Text page via [employer] Paging/Directory    ______________________________________________________________________    Interval History   Afebrile.  Feels slightly  "better compared to yesterday but still weak compared to baseline.  Denies new cough, abdominal pain or sore throat.  No joint pain.    Data reviewed today: I reviewed all medications, new labs and imaging results over the last 24 hours. I personally reviewed no images or EKG's today.    Physical Exam   Vital signs:  Temp: 97.8  F (36.6  C) Temp src: Oral BP: 124/51 Pulse: 57   Resp: 16 SpO2: 97 % O2 Device: None (Room air)   Height: 172.7 cm (5' 8\") Weight: 65.8 kg (145 lb)  Estimated body mass index is 22.05 kg/m  as calculated from the following:    Height as of this encounter: 1.727 m (5' 8\").    Weight as of this encounter: 65.8 kg (145 lb).      Wt Readings from Last 2 Encounters:   08/16/24 65.8 kg (145 lb)   02/12/24 67.3 kg (148 lb 6.4 oz)       Gen: AAOX3, NAD, comfortable  HEENT: Supple neck, moist oral mucosa, no pallor  Resp: CTA B/L, normal WOB  CVS: RRR, no murmur  Abd/GI: Soft, non-tender. BS- normoactive.    Skin: Left leg with a small area of erythema which appears to be feeding  MSK: No pedal edema  Neuro- CN- intact. No focal deficits.       Data   Recent Labs   Lab 08/17/24  0734 08/17/24  0721 08/17/24  0221 08/16/24  1601 08/16/24  0916   WBC 8.6  --   --   --  15.8*   HGB 11.7*  --   --   --  14.6   MCV 91  --   --   --  92     --   --   --  227     --   --   --  140   POTASSIUM 3.7  --   --   --  4.1   CHLORIDE 108*  --   --   --  98   CO2 24  --   --   --  25   BUN 20.8  --   --   --  27.9*   CR 1.52*  --   --   --  1.66*   ANIONGAP 10  --   --   --  17*   JESUS 9.0  --   --   --  10.1   GLC 96 100* 114*   < > 237*   ALBUMIN 3.2*  --   --   --  4.4   PROTTOTAL 5.8*  --   --   --  7.1   BILITOTAL 0.5  --   --   --  0.6   ALKPHOS 51  --   --   --  70   ALT 46  --   --   --  11   *  --   --   --  19    < > = values in this interval not displayed.       Recent Results (from the past 24 hour(s))   Head CT w/o contrast    Narrative    EXAM: CT HEAD W/O CONTRAST  8/16/2024 10:14 " AM     HISTORY: fall       COMPARISON: None    TECHNIQUE: Using multidetector thin collimation helical acquisition  technique, axial, coronal and sagittal CT images from the skull base  to the vertex were obtained without intravenous contrast.   (topogram) image(s) also obtained and reviewed. Dose reduction  techniques were used.    FINDINGS:  No acute intracranial hemorrhage, mass effect, or midline shift.  Subcortical, periventricular areas of white matter hypoattenuation,  moderate parenchymal volume loss and compensatory ventricular  dilatation compatible with sequelae of chronic microvascular ischemic  disease. Preserved gray-white matter differentiation.    Atraumatic calvarium. No substantial paranasal sinus mucosal disease.  Clear mastoid air cells. Nonfocal orbits.       Impression    IMPRESSION: Negative for acute intracranial hemorrhage, hydrocephalus  or transcortical infarct. No skull fracture.    GEOFFREY KUMAR          SYSTEM ID:  [id number]   Abd/pelvis CT no contrast - Stone Protocol    Narrative    CT ABDOMEN PELVIS WITHOUT CONTRAST 8/16/2024 10:15 AM    CLINICAL HISTORY: Abdominal pain.  Back pain. History of kidney  stones, urinary frequency.   TECHNIQUE: CT scan of the abdomen and pelvis was performed without IV  contrast. Multiplanar reformats were obtained. Dose reduction  techniques were used.  CONTRAST: None.    COMPARISON: May 15, 2019    FINDINGS:   LOWER CHEST: Small hiatal hernia. No infiltrates or effusions.    HEPATOBILIARY: Normal contour with no significant mass. No bile duct  dilatation. Calcified gallstones.    PANCREAS: No significant mass, duct dilatation, or inflammatory  change.    SPLEEN: Normal size.    ADRENAL GLANDS: No significant nodules.    KIDNEYS/BLADDER: 6 mm nonobstructing stone inferior left kidney. No  other urolithiasis or hydronephrosis.    BOWEL: No obstruction or inflammatory change.    VASCULATURE: No abdominal aortic aneurysm.    PELVIC ORGANS: No pelvic  masses.    OTHER: No free air or free fluid.    MUSCULOSKELETAL: No frankly destructive bony lesions. Spine  degenerative changes.      Impression    IMPRESSION:   1.  Nonobstructing stone on the left measuring 6 mm.  2.  No ureteral stones or hydronephrosis bilaterally.    FEMI DUVALL MD         SYSTEM ID:  [id number]   XR Knee Bilateral 3 Views    Narrative    EXAM: XR KNEE BILATERAL 3 VIEWS  DATE/TIME: 8/16/2024 10:32 AM    INDICATION: fall, pain  COMPARISON: None available.       Impression    IMPRESSION: Mild narrowing of the patellofemoral compartment on the  right. Remaining joint spaces are preserved and normally aligned.  Chondrocalcinosis of both knees. No acute fracture or joint effusion.       ARIAS GONZALES DO         SYSTEM ID:  [id number]   Chest XR,  PA & LAT    Narrative    CHEST TWO VIEWS 8/16/2024 11:50 AM     HISTORY: Weakness.    COMPARISON: February 18, 2018       Impression    IMPRESSION: There are no acute infiltrates. The cardiac silhouette is  not enlarged. Pulmonary vasculature is unremarkable.    FEMI DUVALL MD         SYSTEM ID:  [id number]   US Lower Extremity Venous Duplex Bilateral    Narrative    VENOUS ULTRASOUND BILATERAL LOWER EXTREMITIES  8/16/2024 12:52 PM     HISTORY: Weakness, leg pain.    COMPARISON: None.    TECHNIQUE: Color Doppler and spectral waveform analysis performed  throughout the deep veins of both lower extremities.    FINDINGS: Both common femoral, proximal great saphenous, femoral, and  popliteal veins demonstrate normal blood flow, compression, and  augmentation. Posterior tibial and peroneal veins are compressible.      Impression    IMPRESSION: Negative for deep venous thrombosis throughout both lower  extremities.    CHELSEA LOZADA MD         SYSTEM ID:  [id number]     Medications   Current Facility-Administered Medications   Medication Dose Route Frequency Provider Last Rate Last Admin    sodium chloride 0.9 % infusion   Intravenous Continuous Bolivar Esposito  MD Bautista 75 mL/hr at 08/17/24 0513 New Bag at 08/17/24 0513     Current Facility-Administered Medications   Medication Dose Route Frequency Provider Last Rate Last Admin    citalopram (celeXA) tablet 30 mg  30 mg Oral Daily Bolivar Esposito MD   30 mg at 08/17/24 0820    heparin ANTICOAGULANT injection 5,000 Units  5,000 Units Subcutaneous Q8H Bolivar Esposito MD   5,000 Units at 08/17/24 0515    [Held by provider] hydroCHLOROthiazide tablet 12.5 mg  12.5 mg Oral Daily Bolivar Esposito MD        insulin aspart (NovoLOG) injection (RAPID ACTING)  1-10 Units Subcutaneous TID AC Bolivar Esposito MD        insulin aspart (NovoLOG) injection (RAPID ACTING)  1-7 Units Subcutaneous At Bedtime Bolivar Esposito MD        lisinopril (ZESTRIL) tablet 40 mg  40 mg Oral Daily Bolivar Esposito MD   40 mg at 08/17/24 0820    pantoprazole (PROTONIX) EC tablet 40 mg  40 mg Oral BID Bolivar Esposito MD   40 mg at 08/17/24 0820    piperacillin-tazobactam (ZOSYN) 3.375 g vial to attach to  mL bag  3.375 g Intravenous Q6H Bolivar Esposito MD   3.375 g at 08/17/24 0515    rosuvastatin (CRESTOR) tablet 40 mg  40 mg Oral At Bedtime Bolivar Esposito MD   40 mg at 08/16/24 2150    senna-docusate (SENOKOT-S/PERICOLACE) 8.6-50 MG per tablet 1 tablet  1 tablet Oral BID Bolivar Esposito MD   1 tablet at 08/16/24 2155    Or    senna-docusate (SENOKOT-S/PERICOLACE) 8.6-50 MG per tablet 2 tablet  2 tablet Oral BID Bolivar Esposito MD        sodium chloride (PF) 0.9% PF flush 3 mL  3 mL Intracatheter Q8H Bolivar Esposito MD        vancomycin (VANCOCIN) 750 mg in sodium chloride 0.9 % 250 mL intermittent infusion  750 mg Intravenous Q24H Bolivar Esposito MD

## 2024-08-17 NOTE — PLAN OF CARE
Goal Outcome Evaluation:      Plan of Care Reviewed With: patient    Overall Patient Progress: improvingOverall Patient Progress: improving     A&O x 4. VSS, RA. CMS intact. Denies pain. Redness on LLE unchanged. Assist of 1 with walker. Pending culture.

## 2024-08-17 NOTE — PLAN OF CARE
Goal Outcome Evaluation:       Diagnosis: Generalized weakness, Sepsis, LLE cellulitis   Orientation/Cognitive: A&OX4  Mobility Level/Assist Equipment: Ax1 GB/W  Antibiotics & Plan: IVF, ZOSYN Q6hrs)  Pain Management: denies  Tele/VS/O2: VSS@RA  Diet: Regular  Bowel/Bladder: incont./external cath  Skin: Abrasion-BLE/ knee  Drains/Devices: PIV infusing NS@75ml/hr  Discharge date: TBD

## 2024-08-18 ENCOUNTER — APPOINTMENT (OUTPATIENT)
Dept: PHYSICAL THERAPY | Facility: CLINIC | Age: 84
DRG: 872 | End: 2024-08-18
Payer: COMMERCIAL

## 2024-08-18 ENCOUNTER — APPOINTMENT (OUTPATIENT)
Dept: OCCUPATIONAL THERAPY | Facility: CLINIC | Age: 84
DRG: 872 | End: 2024-08-18
Attending: HOSPITALIST
Payer: COMMERCIAL

## 2024-08-18 LAB
GLUCOSE BLDC GLUCOMTR-MCNC: 153 MG/DL (ref 70–99)
GLUCOSE BLDC GLUCOMTR-MCNC: 163 MG/DL (ref 70–99)
GLUCOSE BLDC GLUCOMTR-MCNC: 176 MG/DL (ref 70–99)
GLUCOSE BLDC GLUCOMTR-MCNC: 238 MG/DL (ref 70–99)
GLUCOSE BLDC GLUCOMTR-MCNC: 279 MG/DL (ref 70–99)
GLUCOSE BLDC GLUCOMTR-MCNC: 311 MG/DL (ref 70–99)

## 2024-08-18 PROCEDURE — 97112 NEUROMUSCULAR REEDUCATION: CPT | Mod: GP

## 2024-08-18 PROCEDURE — 258N000003 HC RX IP 258 OP 636: Performed by: HOSPITALIST

## 2024-08-18 PROCEDURE — 97535 SELF CARE MNGMENT TRAINING: CPT | Mod: GO

## 2024-08-18 PROCEDURE — 97530 THERAPEUTIC ACTIVITIES: CPT | Mod: GO

## 2024-08-18 PROCEDURE — 97116 GAIT TRAINING THERAPY: CPT | Mod: GP

## 2024-08-18 PROCEDURE — 250N000011 HC RX IP 250 OP 636: Performed by: HOSPITALIST

## 2024-08-18 PROCEDURE — 99232 SBSQ HOSP IP/OBS MODERATE 35: CPT | Performed by: INTERNAL MEDICINE

## 2024-08-18 PROCEDURE — 97165 OT EVAL LOW COMPLEX 30 MIN: CPT | Mod: GO

## 2024-08-18 PROCEDURE — 250N000013 HC RX MED GY IP 250 OP 250 PS 637: Performed by: HOSPITALIST

## 2024-08-18 PROCEDURE — 120N000001 HC R&B MED SURG/OB

## 2024-08-18 RX ADMIN — HEPARIN SODIUM 5000 UNITS: 5000 INJECTION, SOLUTION INTRAVENOUS; SUBCUTANEOUS at 15:26

## 2024-08-18 RX ADMIN — PANTOPRAZOLE SODIUM 40 MG: 40 TABLET, DELAYED RELEASE ORAL at 22:09

## 2024-08-18 RX ADMIN — HEPARIN SODIUM 5000 UNITS: 5000 INJECTION, SOLUTION INTRAVENOUS; SUBCUTANEOUS at 06:40

## 2024-08-18 RX ADMIN — ROSUVASTATIN CALCIUM 40 MG: 20 TABLET, FILM COATED ORAL at 22:09

## 2024-08-18 RX ADMIN — VANCOMYCIN HYDROCHLORIDE 750 MG: 1 INJECTION, POWDER, LYOPHILIZED, FOR SOLUTION INTRAVENOUS at 11:51

## 2024-08-18 RX ADMIN — SENNOSIDES AND DOCUSATE SODIUM 1 TABLET: 50; 8.6 TABLET ORAL at 22:09

## 2024-08-18 RX ADMIN — INSULIN ASPART 4 UNITS: 100 INJECTION, SOLUTION INTRAVENOUS; SUBCUTANEOUS at 12:34

## 2024-08-18 RX ADMIN — HEPARIN SODIUM 5000 UNITS: 5000 INJECTION, SOLUTION INTRAVENOUS; SUBCUTANEOUS at 22:09

## 2024-08-18 RX ADMIN — INSULIN ASPART 1 UNITS: 100 INJECTION, SOLUTION INTRAVENOUS; SUBCUTANEOUS at 08:38

## 2024-08-18 RX ADMIN — CITALOPRAM HYDROBROMIDE 30 MG: 10 TABLET ORAL at 08:37

## 2024-08-18 RX ADMIN — PIPERACILLIN AND TAZOBACTAM 3.38 G: 3; .375 INJECTION, POWDER, FOR SOLUTION INTRAVENOUS at 06:40

## 2024-08-18 RX ADMIN — INSULIN ASPART 2 UNITS: 100 INJECTION, SOLUTION INTRAVENOUS; SUBCUTANEOUS at 17:38

## 2024-08-18 RX ADMIN — PIPERACILLIN AND TAZOBACTAM 3.38 G: 3; .375 INJECTION, POWDER, FOR SOLUTION INTRAVENOUS at 11:24

## 2024-08-18 RX ADMIN — PANTOPRAZOLE SODIUM 40 MG: 40 TABLET, DELAYED RELEASE ORAL at 08:37

## 2024-08-18 RX ADMIN — LISINOPRIL 40 MG: 40 TABLET ORAL at 08:37

## 2024-08-18 RX ADMIN — PIPERACILLIN AND TAZOBACTAM 3.38 G: 3; .375 INJECTION, POWDER, FOR SOLUTION INTRAVENOUS at 17:38

## 2024-08-18 ASSESSMENT — ACTIVITIES OF DAILY LIVING (ADL)
ADLS_ACUITY_SCORE: 52
PREVIOUS_RESPONSIBILITIES: MEAL PREP;LAUNDRY;SHOPPING;MEDICATION MANAGEMENT;FINANCES;DRIVING

## 2024-08-18 NOTE — PROGRESS NOTES
[facility] [place]    Medicine Progress Note - Hospitalist Service        Date of Admission:  8/16/2024  8:55 AM    Assessment & Plan:   Michael Howard is a 83 year old male with PMH significant for diabetes, hypertension, dyslipidemia, GERD, CKD stage III, BPH, depression who presented to ED with generalized weakness and fall, and was found to have sepsis with unclear etiology, admitted inpatient 8/16/24.     Generalized weakness  Sepsis most likely due to left lower extremity cellulitis  Lactic acidosis  -Presented with generalized weakness, no fever.  No significant localizing signs or symptoms other than possible left lower extremity cellulitis  -on initial evaluation afebrile with stable vitals; leukocytosis with WBC 15.8, elevated , UA unremarkable  -lactic acid markedly elevated at 4.2, which improved to normal with IV hydration and antibiotics  -COVID/influenza/RSV negative  -chest x-ray with no acute infiltrates or effusion  -CT abdomen pelvis noted 6 mm left non-obstructive urine stone, no hydronephrosis  -CT head unremarkable  -Exact etiology for presentation unclear, could be due to left lower extremity cellulitis  -Blood culture negative thus far  -Pro-calcitonin is very minimally elevated at 0.47  -Continue empiric Zosyn.  -Etiology for sepsis unclear but most likely left lower extremity cellulitis as no other source apparent at this time  -Evaluated by PT for generalized weakness, plan is home with home care physical therapy.     Diabetes mellitus-2  -PTA on metformin, Jardiance  -will hold off on PTA oral hypoglycemics  -Initial lactic acid could be partially related to PTA metformin  -high resistance sliding scale insulin  -hypoglycemia protocol     Hypertension  Dyslipidemia  -PTA on HCTZ and lisinopril  -Hold HCTZ.  Continue PTA lisinopril with hold parameters  -hydralazine IV PRN for SBP> 180     CKD stage III  -no recent labs to compare with; past labs note baseline creatinine  around 1.5 to 2  -on admission creatinine 1.66, appears around baseline  -Creatinine stable at 1.52 last check     GERD  -Continue prior to admission Prilosec     Depression  -PTA Celexa         Diet: Combination Diet Regular Diet Adult     DVT Prophylaxis: Heparin SQ   Zuniga Catheter: Not present  Code Status: Full Code     Disposition Plan       Expected Discharge Date: 08/19/2024              Entered: James Calderon MD 08/18/2024, 11:54 AM        Medically Ready for Discharge: Dissipated tomorrow if blood culture continues to stay negative and no new concerns for infection       Clinically Significant Risk Factors              # Hypoalbuminemia: Lowest albumin = 3.2 g/dL at 8/17/2024  7:34 AM, will monitor as appropriate                         The patient's care was discussed with the Bedside Nurse and Patient.    Medical Decision Making       **CLEAR ALL SELECTIONS**      Labs/Imaging Reviewed:  See Information above and Data section below  Time SPENT BY ME on the date of service doing chart review, history, exam, documentation & further activities per the note:  35MINUTES    Chart documentation was completed, in part, with [employer] voice-recognition software. Even though reviewed, some grammatical, spelling, and word errors may remain.    James Calderon MD  Hospitalist Service  [facility]  Text Page 7AM-6PM  Securely message with the Vocera Web Console (learn more here)  Text page via [employer] Paging/Directory    ______________________________________________________________________    Interval History   Afebrile.  No new culture data.  Denies new symptoms like cough, abdominal pain, diarrhea.  Overall feels somewhat better and stronger today.    Data reviewed today: I reviewed all medications, new labs and imaging results over the last 24 hours. I personally reviewed no images or EKG's today.    Physical Exam   Vital signs:  Temp: 97.8  F (36.6  C) Temp src: Oral BP: (!) 143/73  "Pulse: 58   Resp: 16 SpO2: 99 % O2 Device: None (Room air)   Height: 172.7 cm (5' 8\") Weight: 65.8 kg (145 lb)  Estimated body mass index is 22.05 kg/m  as calculated from the following:    Height as of this encounter: 1.727 m (5' 8\").    Weight as of this encounter: 65.8 kg (145 lb).      Wt Readings from Last 2 Encounters:   08/16/24 65.8 kg (145 lb)   02/12/24 67.3 kg (148 lb 6.4 oz)       Gen: AAOX3, NAD, comfortable  Resp: CTA B/L, normal WOB  CVS: RRR, no murmur  Abd/GI: Soft, non-tender. BS- normoactive.    Skin: Left leg with a small area of erythema which appears to be much better and fading  MSK: No pedal edema  Neuro- CN- intact. No focal deficits.       Data   Recent Labs   Lab 08/18/24  0805 08/18/24  0228 08/17/24  2105 08/17/24  1135 08/17/24  0734 08/16/24  1601 08/16/24  0916   WBC  --   --   --   --  8.6  --  15.8*   HGB  --   --   --   --  11.7*  --  14.6   MCV  --   --   --   --  91  --  92   PLT  --   --   --   --  173  --  227   NA  --   --   --   --  142  --  140   POTASSIUM  --   --   --   --  3.7  --  4.1   CHLORIDE  --   --   --   --  108*  --  98   CO2  --   --   --   --  24  --  25   BUN  --   --   --   --  20.8  --  27.9*   CR  --   --   --   --  1.52*  --  1.66*   ANIONGAP  --   --   --   --  10  --  17*   JESUS  --   --   --   --  9.0  --  10.1   * 153* 190*   < > 96   < > 237*   ALBUMIN  --   --   --   --  3.2*  --  4.4   PROTTOTAL  --   --   --   --  5.8*  --  7.1   BILITOTAL  --   --   --   --  0.5  --  0.6   ALKPHOS  --   --   --   --  51  --  70   ALT  --   --   --   --  46  --  11   AST  --   --   --   --  159*  --  19    < > = values in this interval not displayed.       No results found for this or any previous visit (from the past 24 hour(s)).    Medications   Current Facility-Administered Medications   Medication Dose Route Frequency Provider Last Rate Last Admin     Current Facility-Administered Medications   Medication Dose Route Frequency Provider Last Rate Last Admin "    citalopram (celeXA) tablet 30 mg  30 mg Oral Daily Bolivar Esposito MD   30 mg at 08/18/24 0837    heparin ANTICOAGULANT injection 5,000 Units  5,000 Units Subcutaneous Q8H Bolivar Esposito MD   5,000 Units at 08/18/24 0640    [Held by provider] hydroCHLOROthiazide tablet 12.5 mg  12.5 mg Oral Daily Bolivar Esposito MD        insulin aspart (NovoLOG) injection (RAPID ACTING)  1-10 Units Subcutaneous TID AC Bolivar Esposito MD   1 Units at 08/18/24 0838    insulin aspart (NovoLOG) injection (RAPID ACTING)  1-7 Units Subcutaneous At Bedtime Bolivar Esposito MD        lisinopril (ZESTRIL) tablet 40 mg  40 mg Oral Daily Bolivar Esposito MD   40 mg at 08/18/24 0837    pantoprazole (PROTONIX) EC tablet 40 mg  40 mg Oral BID Bolivar Esposito MD   40 mg at 08/18/24 0837    piperacillin-tazobactam (ZOSYN) 3.375 g vial to attach to  mL bag  3.375 g Intravenous Q6H Bolivar Esposito MD   3.375 g at 08/18/24 1124    rosuvastatin (CRESTOR) tablet 40 mg  40 mg Oral At Bedtime Bolivar Esposito MD   40 mg at 08/17/24 2223    senna-docusate (SENOKOT-S/PERICOLACE) 8.6-50 MG per tablet 1 tablet  1 tablet Oral BID Bolivar Esposito MD   1 tablet at 08/17/24 2223    Or    senna-docusate (SENOKOT-S/PERICOLACE) 8.6-50 MG per tablet 2 tablet  2 tablet Oral BID Boilvar Esposito MD        sodium chloride (PF) 0.9% PF flush 3 mL  3 mL Intracatheter Q8H Bolivar Esposito MD   3 mL at 08/18/24 0644    vancomycin (VANCOCIN) 750 mg in sodium chloride 0.9 % 250 mL intermittent infusion  750 mg Intravenous Q24H Bolivar Esposito MD   750 mg at 08/18/24 4887

## 2024-08-18 NOTE — PLAN OF CARE
Goal Outcome Evaluation:      Plan of Care Reviewed With: patient    Overall Patient Progress: improvingOverall Patient Progress: improving     A&O x 4. VSS, RA. CMS intact. Denies pain. Redness on LLE improving. Voiding in BR/external cath. Assist of 1 with walker. Possible discharge tomorrow.

## 2024-08-18 NOTE — PROGRESS NOTES
08/18/24 1145   Appointment Info   Signing Clinician's Name / Credentials (OT) Nemo Becerra, OTR/L   Living Environment   People in Home alone   Current Living Arrangements house  (townhouse)   Home Accessibility no concerns;stairs within home  (Stairs upstairs, pt reports only living on main level)   Transportation Anticipated family or friend will provide   Living Environment Comments Pt lives in a house alone, reports having famly near by who check in on him. Stairs within the home to upstairs, pt reports living only on main level. [place]. Has walk in shower with grab bar outside, no shower chair.   Self-Care   Usual Activity Tolerance good   Current Activity Tolerance moderate   Regular Exercise Yes   Activity/Exercise Type   (walking, recently purchased stationary bike and has used a little)   Equipment Currently Used at Home walker, rolling;walker, standard  (shoe horn, reacher)   Fall history within last six months no   Activity/Exercise/Self-Care Comment Pt is typically IND without an AD at baseline, reports having 4WW and fWW at home but does not use them at this time.   Instrumental Activities of Daily Living (IADL)   Previous Responsibilities meal prep;laundry;shopping;medication management;finances;driving   IADL Comments ind with all IADLs, does have    General Information   Onset of Illness/Injury or Date of Surgery 08/16/24   Referring Physician Bolivar Esposito MD   Patient/Family Therapy Goal Statement (OT) return home   Additional Occupational Profile Info/Pertinent History of Current Problem Pt is a 83 year old male with PMH significant for diabetes, hypertension, dyslipidemia, GERD, CKD stage III, BPH, depression who presented to ED with generalized weakness and fall, noted with severe sepsis with unclear etiology, admitted inpatient 8/16/24. Patient lives alone at home and has been feeling very weak since night prior to admission, denied any fever, no chest  "pain or shortness of breath, does report of some poor urinary stream but no increased frequency. He slid out of bed and was unable to get up and was brought to ED by EMS.   Existing Precautions/Restrictions fall   Left Upper Extremity (Weight-bearing Status) full weight-bearing (FWB)   Right Upper Extremity (Weight-bearing Status) full weight-bearing (FWB)   Left Lower Extremity (Weight-bearing Status) full weight-bearing (FWB)   Right Lower Extremity (Weight-bearing Status) full weight-bearing (FWB)   Cognitive Status Examination   Orientation Status orientation to person, place and time   Cognitive Status Comments Completed portions of cog screens during func amb, no significant cog concerns. Appears WFL.   Visual Perception   Visual Impairment/Limitations corrective lenses full-time   Sensory   Sensory Comments \"buzzing\" in R leg, baseline   Pain Assessment   Patient Currently in Pain Yes, see Vital Sign flowsheet  (some soreness in inner thighs from sliding out of bed and reaching for phone)   Range of Motion Comprehensive   Comment, General Range of Motion B UE WNL   Strength Comprehensive (MMT)   Comment, General Manual Muscle Testing (MMT) Assessment Slight deconditioning, strength WFL   Muscle Tone Assessment   Muscle Tone Quick Adds No deficits were identified   Coordination   Upper Extremity Coordination No deficits were identified   Bed Mobility   Comment (Bed Mobility) <Min A x 1   Transfers   Transfer Comments STS CGA/FWW   Balance   Balance Comments No LOB during func activity, rec walker at all times at discharge for inc safety   Activities of Daily Living   BADL Assessment/Intervention bathing;lower body dressing;toileting   Bathing Assessment/Intervention   [place] Level (Bathing) modified independence;supervision   Comment, (Bathing) Per clinical judgment   Lower Body Dressing Assessment/Training   Comment, (Lower Body Dressing) Difficulty reaching figure 4 in sitting. With AE, min A needed " (would benefit from review)   [place] Level (Lower Body Dressing) modified independence;set up;minimum assist (75% patient effort)   Toileting   [place] Level (Toileting) modified independence  (SBA)   Clinical Impression   Criteria for Skilled Therapeutic Interventions Met (OT) Yes, treatment indicated   OT Diagnosis Decreased I/ADL ind   OT Problem List-Impairments impacting ADL problems related to;balance;mobility;strength   Assessment of Occupational Performance 3-5 Performance Deficits   Identified Performance Deficits toileting, showering, dressing, household IADLs   Planned Therapy Interventions (OT) ADL retraining;IADL retraining;cognition;strengthening;home program guidelines;progressive activity/exercise;risk factor education;transfer training  (AE/DME recs)   Clinical Decision Making Complexity (OT) problem focused assessment/low complexity   Risk & Benefits of therapy have been explained evaluation/treatment results reviewed;care plan/treatment goals reviewed;risks/benefits reviewed;current/potential barriers reviewed;participants voiced agreement with care plan;participants included;patient   Clinical Impression Comments Pt would benefit from skilled IP OT to educate and progress safety and ind for I/ADLs and to rec safe discharge.   OT Total Evaluation Time   OT Eval, Low Complexity Minutes (84098) 10   OT Goals   Therapy Frequency (OT) Daily   OT Predicted Duration/Target Date for Goal Attainment 08/25/24   OT Goals Upper Body Dressing;Lower Body Dressing;Transfers;Toilet Transfer/Toileting;OT Goal 1;OT Goal 2;Cognition   OT: Upper Body Dressing Modified independent;including set-up/clothing retrieval   OT: Lower Body Dressing Modified independent;including set-up/clothing retrieval;using adaptive equipment   OT: Transfer Modified independent  (Pt will demonstrate safe walk in shower tx with shower chair (grab bar outside shower))   OT: Toilet Transfer/Toileting Modified independent;cleaning  and garment management   OT: Cognitive Patient/caregiver will verbalize understanding of cognitive assessment results/recommendations as needed for safe discharge planning   OT: Goal 1 Pt will verbalize agreement to recs for inc safety including having daily check-ins and life alert   OT: Goal 2 Pt will verbalize agreement to recs for AE/DME for inc safety   Interventions   Interventions Quick Adds Self-Care/Home Management;Therapeutic Activity   Self-Care/Home Management   Self-Care/Home Mgmt/ADL, Compensatory, Meal Prep Minutes (17085) 27   Symptoms Noted During/After Treatment (Meal Preparation/Planning Training)   (reported soreness in inner thigh region)   Treatment Detail/Skilled Intervention Pt greeted for OT eval/treat, sleeping but easily aroused and alert. Edu on role. Agreeable to participate. Inc time for room set up. Bed mobility <min A x 1. Good sitting balance. Assessed pt's ind with LB dressing, unable to reach figure 4. Edu provided on AE for LB dressing with visual demo provided. Set up provided, inc time and cuing/min A needed (will benefit from review). Doffed/donned socks with reacher/sock aide - wears socks frequently. Donned briefs with reacher. SBA/FWW for standing and managing briefs. Toilet tx with SBA/mod I and cues- attempted to have pt not hold onto anything for tx because pt doesn't have anything to hold onto near toilet at home, but pt did need to hold onto grab bar, mostly due to sore LEs (see further for recs). Following [facility] amb, pt receptive to sitting in chair. Hand out provided with AE recs - educated pt on rec for shower chair, toilet safety frame vs. grab bar (doesn't think he has anything to hold onto near toilet) and AE for dressing. Written on hand out included all recs OT discussed with pt including having life alert system/phone on at all times, daily check-ins from family (safest to have initial assist at [facility] with pt), and rec for HH OT/PT. Pt was in agreemen to this  as long as it is covered. Edu on POC. Alarm on for safety and tray table in front of pt.   Therapeutic Activities   Therapeutic Activity Minutes (06626) 10   Treatment Detail/Skilled Intervention OT engaged pt in func amb to progress activity tolerance for I/ADLs and meanwhile, engage pt in func cog activities. Able to ambulate and simultaneously complete portions of SLUMs/SBT screen (count backwards from 20, name 15 animals in 1 min, delayed object recall with distraction). Able to name address, location of family members, and 100% on home safety questions. Agreeable to rec for life alert and using walker at all times. Total ambulation 200 ft with SBA/FWW.   OT Discharge Planning   OT Plan Review AE for LB dressing using briefs/socks involving FB dressing with retreival. Review recs (especially if family present - daily check-ins or staying with pt initially, life alert/phone, walker for all ADLs, and obtaining shower chair, toilet safety frame vs. grab bar, AE for LB dressing. Monitor cog - screen if needed.   OT Discharge Recommendation (DC Rec) home with assist;home with home care occupational therapy   OT Rationale for DC Rec Pt is slightly below baseline. Lives alone and typically ind with I/ADLs and mobility without AE/DME or AD. No apparent cog deficits/good safety awareness per home safety questions. Ambulated 200 ft with SBA/FWW. Rec daily check-ins from family/neighbors (good family support), life alert, equipment suggestions (see below). Use of walker for all I/ADLs. HH OT for home adaptation suggestions and I/ADL ind. If pt cannot have daily check-ins, TCU may need to be considered, however, pt may be mobilizing too well for TCU. Rec pt has assist (HH OT vs. family) for initial showers and rec shower chair.   OT Brief overview of current status SBA/mod I. AE LB dressing (recommend review). Goals of therapy will be to address safe mobility and make recs for d/c to next level of care. Pt and RN will  continue to follow all falls risk precautions as documented by RN staff while hospitalized.   OT Equipment Needed at Discharge shower chair  (toilet safety frame vs. grab bar. AE for LB dressing (believe pt already has reacher and shoe horn, will need sock aid))   Total Session Time   Timed Code Treatment Minutes 37   Total Session Time (sum of timed and untimed services) 47

## 2024-08-18 NOTE — PLAN OF CARE
Goal Outcome Evaluation:      Plan of Care Reviewed With: patient    Overall Patient Progress: improvingOverall Patient Progress: improving     1900-0730      Diagnosis: Generalized weakness, Sepsis, LLE cellulitis   Orientation/Cognitive: A&OX4  Mobility Level/Assist Equipment: Ax1 GB/W  Antibiotics & Plan: VANCO Q24hrs,  ZOSYN Q6hrs  Pain Management: denies  Tele/VS/O2: VSS@RA  Diet: Regular  Bowel/Bladder: incont. external cath  Drains/Devices: PIV SL w/ int. ABX  Discharge date: TBD

## 2024-08-19 ENCOUNTER — APPOINTMENT (OUTPATIENT)
Dept: OCCUPATIONAL THERAPY | Facility: CLINIC | Age: 84
DRG: 872 | End: 2024-08-19
Payer: COMMERCIAL

## 2024-08-19 ENCOUNTER — APPOINTMENT (OUTPATIENT)
Dept: PHYSICAL THERAPY | Facility: CLINIC | Age: 84
DRG: 872 | End: 2024-08-19
Payer: COMMERCIAL

## 2024-08-19 VITALS
SYSTOLIC BLOOD PRESSURE: 144 MMHG | HEART RATE: 60 BPM | DIASTOLIC BLOOD PRESSURE: 66 MMHG | OXYGEN SATURATION: 97 % | WEIGHT: 145 LBS | BODY MASS INDEX: 21.98 KG/M2 | HEIGHT: 68 IN | TEMPERATURE: 97.4 F | RESPIRATION RATE: 16 BRPM

## 2024-08-19 LAB
GLUCOSE BLDC GLUCOMTR-MCNC: 111 MG/DL (ref 70–99)
GLUCOSE BLDC GLUCOMTR-MCNC: 120 MG/DL (ref 70–99)
GLUCOSE BLDC GLUCOMTR-MCNC: 195 MG/DL (ref 70–99)
PLATELET # BLD AUTO: 185 10E3/UL (ref 150–450)

## 2024-08-19 PROCEDURE — 97530 THERAPEUTIC ACTIVITIES: CPT | Mod: GP

## 2024-08-19 PROCEDURE — 36415 COLL VENOUS BLD VENIPUNCTURE: CPT | Performed by: HOSPITALIST

## 2024-08-19 PROCEDURE — 97116 GAIT TRAINING THERAPY: CPT | Mod: GP

## 2024-08-19 PROCEDURE — 97535 SELF CARE MNGMENT TRAINING: CPT | Mod: GO

## 2024-08-19 PROCEDURE — 250N000013 HC RX MED GY IP 250 OP 250 PS 637: Performed by: HOSPITALIST

## 2024-08-19 PROCEDURE — 85049 AUTOMATED PLATELET COUNT: CPT | Performed by: HOSPITALIST

## 2024-08-19 PROCEDURE — 99239 HOSP IP/OBS DSCHRG MGMT >30: CPT | Performed by: INTERNAL MEDICINE

## 2024-08-19 PROCEDURE — 250N000011 HC RX IP 250 OP 636: Performed by: HOSPITALIST

## 2024-08-19 RX ADMIN — PIPERACILLIN AND TAZOBACTAM 3.38 G: 3; .375 INJECTION, POWDER, FOR SOLUTION INTRAVENOUS at 00:26

## 2024-08-19 RX ADMIN — PIPERACILLIN AND TAZOBACTAM 3.38 G: 3; .375 INJECTION, POWDER, FOR SOLUTION INTRAVENOUS at 11:54

## 2024-08-19 RX ADMIN — HEPARIN SODIUM 5000 UNITS: 5000 INJECTION, SOLUTION INTRAVENOUS; SUBCUTANEOUS at 14:02

## 2024-08-19 RX ADMIN — HEPARIN SODIUM 5000 UNITS: 5000 INJECTION, SOLUTION INTRAVENOUS; SUBCUTANEOUS at 05:49

## 2024-08-19 RX ADMIN — CITALOPRAM HYDROBROMIDE 30 MG: 10 TABLET ORAL at 08:44

## 2024-08-19 RX ADMIN — PIPERACILLIN AND TAZOBACTAM 3.38 G: 3; .375 INJECTION, POWDER, FOR SOLUTION INTRAVENOUS at 05:49

## 2024-08-19 RX ADMIN — LISINOPRIL 40 MG: 40 TABLET ORAL at 08:44

## 2024-08-19 RX ADMIN — INSULIN ASPART 3 UNITS: 100 INJECTION, SOLUTION INTRAVENOUS; SUBCUTANEOUS at 12:35

## 2024-08-19 RX ADMIN — PANTOPRAZOLE SODIUM 40 MG: 40 TABLET, DELAYED RELEASE ORAL at 08:45

## 2024-08-19 ASSESSMENT — ACTIVITIES OF DAILY LIVING (ADL)
ADLS_ACUITY_SCORE: 53
ADLS_ACUITY_SCORE: 52
ADLS_ACUITY_SCORE: 53
ADLS_ACUITY_SCORE: 52
ADLS_ACUITY_SCORE: 52
DEPENDENT_IADLS:: INDEPENDENT
ADLS_ACUITY_SCORE: 52
ADLS_ACUITY_SCORE: 52
ADLS_ACUITY_SCORE: 53
ADLS_ACUITY_SCORE: 53
ADLS_ACUITY_SCORE: 52
ADLS_ACUITY_SCORE: 53
ADLS_ACUITY_SCORE: 52

## 2024-08-19 NOTE — PLAN OF CARE
Occupational Therapy Discharge Summary    Reason for therapy discharge:    Discharged to home with home therapy.    Progress towards therapy goal(s). See goals on Care Plan in [facility] electronic health record for goal details.  Goals partially met.  Barriers to achieving goals:   discharge from facility.    Therapy recommendation(s):    Continued therapy is recommended.  Rationale/Recommendations:  Pt is slightly below baseline. Lives alone and typically ind with I/ADLs and mobility without AE/DME or AD. No apparent cog deficits/good safety awareness per home safety questions. Ambulated 200 ft with SBA/FWW. Rec daily check-ins from family/neighbors (good family support), life alert, equipment suggestions (see below). Use of walker for all I/ADLs. HH OT for home adaptation suggestions and I/ADL ind. If pt cannot have daily check-ins, TCU may need to be considered, however, pt may be mobilizing too well for TCU. Rec pt has assist (HH OT vs. family) for initial showers and rec shower chair.

## 2024-08-19 NOTE — DISCHARGE SUMMARY
[facility]    Discharge Summary  Hospitalist    Date of Admission:  8/16/2024  Date of Discharge:  8/19/2024  Discharging Provider: James Calderon MD    Discharge Diagnoses      Generalized weakness  Sepsis most likely due to left lower extremity cellulitis  Lactic acidosis   Diabetes mellitus-2  Hypertension  Dyslipidemia  CKD stage III  GERD  Depression     Hospital Course:    Michael Howard is a 83 year old male with PMH significant for diabetes, hypertension, dyslipidemia, GERD, CKD stage III, BPH, depression who presented to ED with generalized weakness and fall, and was found to have sepsis with unclear etiology, admitted inpatient 8/16/24.     Generalized weakness  Sepsis most likely due to left lower extremity cellulitis  Lactic acidosis  -Presented with generalized weakness, no fever.  No significant localizing signs or symptoms other than possible left lower extremity cellulitis  -on initial evaluation afebrile with stable vitals; leukocytosis with WBC 15.8, elevated , UA unremarkable  -lactic acid markedly elevated at 4.2, which improved to normal with IV hydration and antibiotics  -COVID/influenza/RSV negative  -chest x-ray with no acute infiltrates or effusion  -CT abdomen pelvis noted 6 mm left non-obstructive urine stone, no hydronephrosis  -CT head unremarkable  -Presentation felt to be due to left lower extremity cellulitis  -Blood culture negative thus at 48 hours  -Pro-calcitonin is very minimally elevated at 0.47  -Initially on empiric Zosyn.  -Etiology for sepsis unclear but most likely left lower extremity cellulitis as no other source apparent at this time  -Left erythema resolved. Pt feeling much better. Discharge home on 4 more days of PO Augmentin to complete 7 days of antibiotics.  -Evaluated by PT for generalized weakness, plan is home with home care physical therapy.     Diabetes mellitus-2  - resume PTA on metformin, Jardiance      Hypertension  Dyslipidemia  -resume PTA HCTZ and lisinopril     CKD stage III  -no recent labs to compare with; past labs note baseline creatinine around 1.5 to 2  -on admission creatinine 1.66, appears around baseline  -Creatinine stable at 1.52 last check     GERD  -Continue prior to admission Prilosec     Depression  -PTA Celexa          James Calderon MD    Significant Results and Procedures     Pending Results     Unresulted Labs Ordered in the Past 30 Days of this Admission       Date and Time Order Name Status Description    8/16/2024  9:50 AM Blood Culture Peripheral Blood Preliminary             Code Status   Full Code       Primary Care Physician   Alda José    Physical Exam   Temp: 97.4  F (36.3  C) Temp src: Oral BP: (!) 144/66 Pulse: 60   Resp: 16 SpO2: 97 % O2 Device: None (Room air)      Constitutional: AAOX3, NAD  Respiratory: CTA B/L, Normal WOB  Cardiovascular: RRR, No murmur  GI: Soft, Non- tender, BS- normoactive  Skin/Integument: LLE erythema-resolved  MSK: no edema  Neuro: CN- grossly intact, Moving all 4 extremities.     Discharge Disposition   Discharged to home  Condition at discharge: Stable    Consultations This Hospital Stay   PHARMACY TO DOSE VANCO  PHARMACY TO DOSE VANCO  PHYSICAL THERAPY ADULT IP CONSULT  OCCUPATIONAL THERAPY ADULT IP CONSULT  CARE MANAGEMENT / SOCIAL WORK IP CONSULT  VASCULAR ACCESS ADULT IP CONSULT    Time Spent on this Encounter   IJames MD, personally saw the patient today and spent greater than 30 minutes discharging this patient.    Discharge Orders      Primary Care - Care Coordination Referral      Home Care Referral      Reason for your hospital stay    Sepsis most likely due to left lower extremity cellulitis     Follow-up and recommended labs and tests     Follow up with primary care provider, Alda José, within 7 days for hospital follow- up.  BMP in 1 week.     Activity    Your activity upon discharge: activity as tolerated  "    Diet    Follow this diet upon discharge: Orders Placed This Encounter      Combination Diet Regular Diet Adult     Discharge Medications   Current Discharge Medication List        START taking these medications    Details   amoxicillin-clavulanate (AUGMENTIN) 875-125 MG tablet Take 1 tablet by mouth 2 times daily  Qty: 8 tablet, Refills: 0    Associated Diagnoses: Cellulitis of left lower extremity           CONTINUE these medications which have NOT CHANGED    Details   ACETAMINOPHEN PO Take 1 tablet by mouth as needed for other (headache)      citalopram (CELEXA) 20 MG tablet Take 30 mg by mouth daily      desonide (DESOWEN) 0.05 % external cream ~every other day      empagliflozin (JARDIANCE) 10 MG TABS tablet Take 10 mg by mouth daily      Ferrous Gluconate 324 (37.5 Fe) MG TABS Take 1 tablet by mouth 2 times daily      hydrochlorothiazide (HYDRODIURIL) 12.5 MG tablet Take 12.5 mg by mouth daily      ipratropium (ATROVENT) 0.03 % nasal spray Spray 2 sprays into both nostrils 2 times daily      lisinopril (ZESTRIL) 40 MG tablet Take 40 mg by mouth daily      metFORMIN (GLUCOPHAGE) 500 MG tablet Take 500 mg by mouth 2 times daily (with meals)      Multiple Vitamins-Minerals (PRESERVISION AREDS 2 PO) Take 1 capsule by mouth 2 times daily      NONFORMULARY \"Tylenol PM\" - 1 tab QHS      omeprazole (PRILOSEC) 40 MG DR capsule Take 40 mg by mouth every morning      rosuvastatin (CRESTOR) 40 MG tablet Take 40 mg by mouth at bedtime      Vitamin D (Cholecalciferol) 50 MCG (2000 UT) CAPS Take 1 capsule by mouth daily      blood glucose monitoring (ONE TOUCH ULTRASOFT) lancets Check blood sugars once daily as directed  Refills: 2      ONETOUCH ULTRA test strip daily Check blood sugars once daily as directed  Refills: 3           Allergies   Allergies   Allergen Reactions    Ciprofibrate GI Disturbance     with abd pain    Ciprofloxacin      Gi bleed     Data   Most Recent 3 CBC's:  Recent Labs   Lab Test 08/19/24  0754 " 08/17/24  0734 08/16/24  0916 04/17/21  1857   WBC  --  8.6 15.8* 11.9*   HGB  --  11.7* 14.6 11.3*   MCV  --  91 92 90    173 227 463*      Most Recent 3 BMP's:  Recent Labs   Lab Test 08/19/24  0809 08/19/24  0236 08/18/24  2151 08/17/24  1135 08/17/24  0734 08/16/24  1601 08/16/24  0916 01/15/24  0000 02/03/23  0000 04/17/21  1857   0000   NA  --   --   --   --  142  --  140  --   --  131*  --    POTASSIUM  --   --   --   --  3.7  --  4.1  --   --  3.6  --    CHLORIDE  --   --   --   --  108*  --  98 100   < > 98  --    CO2  --   --   --   --  24  --  25  --   --  28  --    BUN  --   --   --   --  20.8  --  27.9*  --   --  33*  --    CR  --   --   --   --  1.52*  --  1.66*  --   --  1.52*  --    ANIONGAP  --   --   --   --  10  --  17*  --   --  5  --    JESUS  --   --   --   --  9.0  --  10.1  --   --  9.4  --    * 111* 279*   < > 96   < > 237*  --   --  266*   < >    < > = values in this interval not displayed.     Most Recent 2 LFT's:  Recent Labs   Lab Test 08/17/24  0734 08/16/24  0916   * 19   ALT 46 11   ALKPHOS 51 70   BILITOTAL 0.5 0.6     Most Recent INR's and Anticoagulation Dosing History:  Anticoagulation Dose History           No data to display              Most Recent 3 Troponin's:No lab results found.  Most Recent Cholesterol Panel:  Recent Labs   Lab Test 01/15/24  0000   TRIG 80     Most Recent 6 Bacteria Isolates From Any Culture (See EPIC Reports for Culture Details):  Recent Labs   Lab Test 04/18/19  1050 09/14/18  1804   CULT <1000 colonies/mL  urogenital patricia  Susceptibility testing not routinely done   50,000 to 100,000 colonies/mL  Enterococcus faecalis  *     Most Recent TSH, T4 and A1c Labs:  Recent Labs   Lab Test 11/26/18  0000   A1C 6.7*       Results for orders placed or performed during the hospital encounter of 08/16/24   Head CT w/o contrast    Narrative    EXAM: CT HEAD W/O CONTRAST  8/16/2024 10:14 AM     HISTORY: fall       COMPARISON: None    TECHNIQUE:  Using multidetector thin collimation helical acquisition  technique, axial, coronal and sagittal CT images from the skull base  to the vertex were obtained without intravenous contrast.   (topogram) image(s) also obtained and reviewed. Dose reduction  techniques were used.    FINDINGS:  No acute intracranial hemorrhage, mass effect, or midline shift.  Subcortical, periventricular areas of white matter hypoattenuation,  moderate parenchymal volume loss and compensatory ventricular  dilatation compatible with sequelae of chronic microvascular ischemic  disease. Preserved gray-white matter differentiation.    Atraumatic calvarium. No substantial paranasal sinus mucosal disease.  Clear mastoid air cells. Nonfocal orbits.       Impression    IMPRESSION: Negative for acute intracranial hemorrhage, hydrocephalus  or transcortical infarct. No skull fracture.    GEOFFREY KUMAR          SYSTEM ID:  [id number]   Abd/pelvis CT no contrast - Stone Protocol    Narrative    CT ABDOMEN PELVIS WITHOUT CONTRAST 8/16/2024 10:15 AM    CLINICAL HISTORY: Abdominal pain.  Back pain. History of kidney  stones, urinary frequency.   TECHNIQUE: CT scan of the abdomen and pelvis was performed without IV  contrast. Multiplanar reformats were obtained. Dose reduction  techniques were used.  CONTRAST: None.    COMPARISON: May 15, 2019    FINDINGS:   LOWER CHEST: Small hiatal hernia. No infiltrates or effusions.    HEPATOBILIARY: Normal contour with no significant mass. No bile duct  dilatation. Calcified gallstones.    PANCREAS: No significant mass, duct dilatation, or inflammatory  change.    SPLEEN: Normal size.    ADRENAL GLANDS: No significant nodules.    KIDNEYS/BLADDER: 6 mm nonobstructing stone inferior left kidney. No  other urolithiasis or hydronephrosis.    BOWEL: No obstruction or inflammatory change.    VASCULATURE: No abdominal aortic aneurysm.    PELVIC ORGANS: No pelvic masses.    OTHER: No free air or free  fluid.    MUSCULOSKELETAL: No frankly destructive bony lesions. Spine  degenerative changes.      Impression    IMPRESSION:   1.  Nonobstructing stone on the left measuring 6 mm.  2.  No ureteral stones or hydronephrosis bilaterally.    FEMI DUVALL MD         SYSTEM ID:  [id number]   XR Knee Bilateral 3 Views    Narrative    EXAM: XR KNEE BILATERAL 3 VIEWS  DATE/TIME: 8/16/2024 10:32 AM    INDICATION: fall, pain  COMPARISON: None available.       Impression    IMPRESSION: Mild narrowing of the patellofemoral compartment on the  right. Remaining joint spaces are preserved and normally aligned.  Chondrocalcinosis of both knees. No acute fracture or joint effusion.       ARIAS GONZALES DO         SYSTEM ID:  [id number]   Chest XR,  PA & LAT    Narrative    CHEST TWO VIEWS 8/16/2024 11:50 AM     HISTORY: Weakness.    COMPARISON: February 18, 2018       Impression    IMPRESSION: There are no acute infiltrates. The cardiac silhouette is  not enlarged. Pulmonary vasculature is unremarkable.    FEMI DUVALL MD         SYSTEM ID:  [id number]   US Lower Extremity Venous Duplex Bilateral    Narrative    VENOUS ULTRASOUND BILATERAL LOWER EXTREMITIES  8/16/2024 12:52 PM     HISTORY: Weakness, leg pain.    COMPARISON: None.    TECHNIQUE: Color Doppler and spectral waveform analysis performed  throughout the deep veins of both lower extremities.    FINDINGS: Both common femoral, proximal great saphenous, femoral, and  popliteal veins demonstrate normal blood flow, compression, and  augmentation. Posterior tibial and peroneal veins are compressible.      Impression    IMPRESSION: Negative for deep venous thrombosis throughout both lower  extremities.    CHELSEA LOZADA MD         SYSTEM ID:  [id number]

## 2024-08-19 NOTE — PROGRESS NOTES
Care Management Discharge Note    Discharge Date: 08/19/2024       Discharge Disposition: Home (possibly home care)    Discharge Services:      Discharge DME:      Discharge Transportation: family or friend will provide    Private pay costs discussed: Not applicable    Does the patient's insurance plan have a 3 day qualifying hospital stay waiver?  No    PAS Confirmation Code:    Patient/family educated on Medicare website which has current facility and service quality ratings:      Education Provided on the Discharge Plan:    Persons Notified of Discharge Plans: pt, bedside RN  Patient/Family in Agreement with the Plan: yes    Handoff Referral Completed: Yes    Additional Information:  Pt discharged [facility] home with [facility] for [facility] and OT.  Updated [facility] of discharge and able to access orders in Epic.    Apple Abreu RN, BS  Care Coordinator  [email or web address]

## 2024-08-19 NOTE — CONSULTS
Care Management Initial Consult    General Information  Assessment completed with: Patient, Abilio  Type of CM/SW Visit: Initial Assessment    Primary Care Provider verified and updated as needed: Yes   Readmission within the last 30 days: no previous admission in last 30 days      Reason for Consult: discharge planning  Advance Care Planning: Advance Care Planning Reviewed: present on chart          Communication Assessment  Patient's communication style: spoken language (English or Bilingual)             Cognitive  Cognitive/Neuro/Behavioral: WDL                      Living Environment:   People in home: alone     Current living Arrangements: town home      Able to return to prior arrangements: yes       Family/Social Support:  Care provided by: self  Provides care for: no one  Marital Status:   Children, Friend          Description of Support System: Supportive, Involved         Current Resources:   Patient receiving home care services: No     Community Resources: None  Equipment currently used at home: walker, rolling, walker, standard  Supplies currently used at home: None    Employment/Financial:  Employment Status: retired        Financial Concerns: none           Does the patient's insurance plan have a 3 day qualifying hospital stay waiver?  No    Lifestyle & Psychosocial Needs:  Social Determinants of Health     Food Insecurity: Not on file   Depression: Not at risk (9/18/2023)    PHQ-2     PHQ-2 Score: 2   Housing Stability: Not on file   Tobacco Use: Low Risk  (8/16/2024)    Patient History     Smoking Tobacco Use: Never     Smokeless Tobacco Use: Never     Passive Exposure: Not on file   Financial Resource Strain: Not on file   Alcohol Use: Not on file   Transportation Needs: Not on file   Physical Activity: Not on file   Interpersonal Safety: Not on file   Stress: Not on file   Social Connections: Not on file   Health Literacy: Not on file       Functional Status:  Prior to admission patient needed  assistance:   Dependent ADLs:: Independent  Dependent IADLs:: Independent       Mental Health Status:  Mental Health Status: No Current Concerns       Chemical Dependency Status:  Chemical Dependency Status: No Current Concerns             Values/Beliefs:  Spiritual, Cultural Beliefs, Orthodoxy Practices, Values that affect care: no               Additional Information:  Consult for discharge planning.  Met with patient and introduced self and role.  Pt shared he lives alone independently in a town home.  All needs met on 1 level, does not need to go upstairs.  Pt has a standard walker and rolling walker at home he has access to after discharge.  Discussed therapy recommending shower chair and pt stated he has a small shower and is unsure if a chair would fit.  Discussed having family check on him daily or possibly stay for the first day or 2 to make sure he is doing ok and pt stated he is unsure if anyone can stay with him but has family that can check on him.  States his son and 4 granddaughters live in the area.    Discussed therapy recommending home care PT and OT.  Pt unsure if he wants this at this time.  Stated he did not have good experiences with home care with his wife and fiance which have both passed.  Gave pt a list of home care agencies from Medicare.gov website listed by quality ratings for pt to review and CC to follow up this afternoon to see if pt wants home care.    Addendum 11:28:  Pt agreeable to CC sending referral for home care.  Pt agreeable to any agency that is able to accept.  Referral sent to [facility].    Apple Abreu RN, BS  Care Coordinator  [email or web address]

## 2024-08-19 NOTE — PLAN OF CARE
Date/Time: 08/19: [id number]    Trauma/Ortho/Medical (Choose one): Medical    Diagnosis: Generalized weakness, Fall, Sepsis, LLE cellulitis  POD#: N/A  Mental Status: Oriented x 4  Activity/dangle: Up with SBA with a GB/Walker  Diet: Regular but is on sliding scale coverage  Pain: Denies  Zuniga/Voiding: Bathroom/external catheter  Tele/Restraints/Iso: None  02/LDA: RA. Is on intermittent IV abx  D/C Date: 08/19 to home with [facility] services  Other Info: Has abrasion on (R) knee covered with Meilax. Discharge instructions reviewed with pt and all questions answered. Discharging to home with son via pvt ride. Meds sent with pt

## 2024-08-19 NOTE — PLAN OF CARE
Goal Outcome Evaluation:       [id number]      Plan of Care Reviewed With: patient     Overall Patient Progress: improvingOverall Patient Progress: improving       Diagnosis: Generalized weakness, Sepsis, LLE cellulitis   Orientation/Cognitive: A&OX4  Mobility Level/Assist Equipment: Ax1 GB/W  Pain Management: denies  Tele/VS/O2: VSS@RA  Diet: Regular  Bowel/Bladder: incont. external cath  Drains/Devices: PIV SL w/ int. ABX  Discharge date: pending

## 2024-08-19 NOTE — PLAN OF CARE
Goal Outcome Evaluation:      Plan of Care Reviewed With: patient          Outcome Evaluation: anticipate home with possible home care

## 2024-08-20 ENCOUNTER — PATIENT OUTREACH (OUTPATIENT)
Dept: CARE COORDINATION | Facility: CLINIC | Age: 84
End: 2024-08-20
Payer: COMMERCIAL

## 2024-08-20 ASSESSMENT — ACTIVITIES OF DAILY LIVING (ADL): DEPENDENT_IADLS:: INDEPENDENT

## 2024-08-20 NOTE — PROGRESS NOTES
Clinic Care Coordination Contact  Clinic Care Coordination Contact  OUTREACH    Referral Information:  Referral Source: IP Report: Pt was hospitalized at [facility] from 8/16-8/19 with sepsis-due to le cellulitis, weakness, fall. He was discharged to home with home health care.    Primary Diagnosis: SIRS/Sepsis    Chief Complaint   Patient presents with    Clinic Care Coordination - Post Hospital     SW Outreach        [facility] Utilization:   Clinic Utilization  Difficulty keeping appointments:: No  Compliance Concerns: No  No-Show Concerns: No  No PCP office visit in Past Year: No  Utilization      No Show Count (past year)  0             ED Visits  1             Hospital Admissions  1                    Current as of: 8/20/2024  9:11 AM                Clinical Concerns:  Current Medical Concerns:  Recently hospitalized for sepsis caused by LE cellulitis, weakness/fall. Other medical hx includes diabetes, hypertension, dyslipidemia, GERD, CKD stage III, BPH, depression.  Current Behavioral Concerns: None    Education Provided to patient: SW role and recommended f/up.      Medication Management:  Medication review status: Pt was prescribed Augmentin upon discharge  from the hospital, he reports that he was able to fill this without a problem.    Functional Status:  Dependent ADLs:: Independent  Dependent IADLs:: Independent  Bed or wheelchair confined:: No  Mobility Status: Independent w/Device  Fallen 2 or more times in the past year?: No  Any fall with injury in the past year?: No    Living Situation:  Current living arrangement:: I live alone, I live in a private home  Type of residence:: Town home    Lifestyle & Psychosocial Needs:  Pt is a 83 year old  male. Pt also lost his fiance Marivel to cancer in 2023 which was devastating for him and he received grief counseling through [facility] for this.    No financial or insurance concerns noted.     Social Determinants of Health     Food Insecurity: Low Risk   (8/20/2024)    Food Insecurity     Within the past 12 months, did you worry that your food would run out before you got money to buy more?: No     Within the past 12 months, did the food you bought just not last and you didn t have money to get more?: No   Depression: Not at risk (9/18/2023)    PHQ-2     PHQ-2 Score: 2   Housing Stability: Low Risk  (8/20/2024)    Housing Stability     Do you have housing? : Yes     Are you worried about losing your housing?: No   Tobacco Use: Low Risk  (8/16/2024)    Patient History     Smoking Tobacco Use: Never     Smokeless Tobacco Use: Never     Passive Exposure: Not on file   Financial Resource Strain: Low Risk  (8/20/2024)    Financial Resource Strain     Within the past 12 months, have you or your family members you live with been unable to get utilities (heat, electricity) when it was really needed?: No   Alcohol Use: Not on file   Transportation Needs: Low Risk  (8/20/2024)    Transportation Needs     Within the past 12 months, has lack of transportation kept you from medical appointments, getting your medicines, non-medical meetings or appointments, work, or from getting things that you need?: No   Physical Activity: Not on file   Interpersonal Safety: Low Risk  (8/20/2024)    Interpersonal Safety     Do you feel physically and emotionally safe where you currently live?: Yes     Within the past 12 months, have you been hit, slapped, kicked or otherwise physically hurt by someone?: No     Within the past 12 months, have you been humiliated or emotionally abused in other ways by your partner or ex-partner?: No   Stress: Not on file   Social Connections: Not on file   Health Literacy: Not on file     Diet:: Diabetic diet  Inadequate nutrition (GOAL):: No  Tube Feeding: No  Inadequate activity/exercise (GOAL):: No  Significant changes in sleep pattern (GOAL): No  Transportation means:: Regular car     Mental health DX:: Yes  Mental health DX how managed:: Medication  Mental  "health management concern (GOAL):: No  Chemical Dependency Status: No Current Concerns  Informal Support system:: Children        Resources and Interventions:  Current Resources: [facility] ordered upon discharge from the hospital.      Community Resources: Home Care  Supplies Currently Used at Home: None  Equipment Currently Used at Home: walker, rolling, walker, standard  Employment Status: retired     Advance Care Plan/Directive  Advanced Care Plans/Directives on file:: Yes  Status of record:: On File and Validated  Type Advanced Care Plans/Directives: Advanced Directive - On File    Call placed to pt post hospitalization to check-in. Pt reports that he feels \"tj washed out\". He didn't sleep well in the hospital and is glad to be home, he reports that he slept well at home last night. His son stayed the night with him and plans to stay for a couple of days. Pt is currently using a walker but normally does not need this. Normally pt is independent and drives. Pt has not been contacted by home health care yet, encouraged him to call them tomorrow if he hasn't heard yet, he has their phone number. Offered to help him schedule hospital f/up visit with PCP, pt hesitant at first stating that he didn't want to make any appointments right now as he isn't strong enough to drive, after discussing he was willing to ask his children to bring him. Appointment scheduled for 8/26 at 2:30PM.     Patient/Caregiver understanding: Yes    Outreach Frequency: monthly, more frequently as needed    Future Appointments                In 1 month [facility],     In 1 month Claudia Najera PA-C [facility] [place], LARRY BARNES            Plan: KOMAL GALEAS will outreach in 1 month.    Hellen Meyer [facility]  Social Work Care Coordinator  Phone: [phone]   "

## 2024-08-20 NOTE — PLAN OF CARE
Physical Therapy Discharge Summary    Reason for therapy discharge:    Discharged to home with home therapy.    Progress towards therapy goal(s). See goals on Care Plan in [facility] electronic health record for goal details.  Goals not met.  Barriers to achieving goals:   discharge from facility.    Therapy recommendation(s):    Continued therapy is recommended.  Rationale/Recommendations:  Pt is typically IND without an AD at basesline, [facility] Pt reports having supportive family near by to check in on him. Pt is moving near baseline mobility, currently requiring SBA w/ FWW for all functioanl mobility. Anticipate with further IP P that pt will be safe to return home with further home care PT in order to further address deficits at home.

## 2024-08-21 LAB — BACTERIA BLD CULT: NO GROWTH

## 2024-09-20 ENCOUNTER — PATIENT OUTREACH (OUTPATIENT)
Dept: CARE COORDINATION | Facility: CLINIC | Age: 84
End: 2024-09-20
Payer: COMMERCIAL

## 2024-09-20 NOTE — PROGRESS NOTES
"Clinic Care Coordination Contact  Follow Up Progress Note      Assessment: Call placed to patient to check in on how he has been doing this past month since discharging home from the hospital and receiving home health care.    Pt reports that he has been doing pretty well, he is still receiving home PT but believes that he is almost done with it. He has found it very helpful. Pt reports that he is almost \"back to normal\", is driving again and able to manage his ADL's independently. He has support from his children. The only thing he is still dealing with is getting his stamina back to where it was and seasonal allergies. Pt denied having questions/concerns for KOMAL GALEAS and was appreciative for the call.     Care Gaps:    Health Maintenance Due   Topic Date Due    DIABETIC FOOT EXAM  Never done    DEPRESSION ACTION PLAN  Never done    MEDICARE ANNUAL WELLNESS VISIT  Never done    RSV VACCINE (1 - 1-dose 75+ series) Never done    ADVANCE CARE PLANNING  03/14/2021    A1C  07/15/2024    PHQ-9  07/15/2024    INFLUENZA VACCINE (1) 09/01/2024    COVID-19 Vaccine (4 - 2024-25 season) 09/01/2024     Intervention/Education provided during outreach: Supportive check-in.     Outreach Frequency: monthly, more frequently as needed      Plan:   KOMAL GALEAS will not plan further outreach at this time.    Hellen Meyer [facility]  Social Work Care Coordinator  Phone: [phone]   "

## 2024-09-25 ENCOUNTER — ANCILLARY PROCEDURE (OUTPATIENT)
Dept: GENERAL RADIOLOGY | Facility: CLINIC | Age: 84
End: 2024-09-25
Attending: PHYSICIAN ASSISTANT
Payer: COMMERCIAL

## 2024-09-25 DIAGNOSIS — N20.0 NEPHROLITHIASIS: ICD-10-CM

## 2024-09-25 PROCEDURE — 74018 RADEX ABDOMEN 1 VIEW: CPT | Mod: TC | Performed by: RADIOLOGY

## 2024-09-27 ENCOUNTER — VIRTUAL VISIT (OUTPATIENT)
Dept: UROLOGY | Facility: CLINIC | Age: 84
End: 2024-09-27
Payer: COMMERCIAL

## 2024-09-27 DIAGNOSIS — N20.0 NEPHROLITHIASIS: Primary | ICD-10-CM

## 2024-09-27 DIAGNOSIS — N40.0 BENIGN PROSTATIC HYPERPLASIA WITHOUT LOWER URINARY TRACT SYMPTOMS: ICD-10-CM

## 2024-09-27 PROCEDURE — 99214 OFFICE O/P EST MOD 30 MIN: CPT | Mod: 95 | Performed by: PHYSICIAN ASSISTANT

## 2024-09-27 ASSESSMENT — PAIN SCALES - GENERAL: PAINLEVEL: NO PAIN (0)

## 2024-09-27 NOTE — PROGRESS NOTES
Virtual Visit Details    Type of service:  Video Visit   Video Start Time: 12:55 PM  Video End Time:1:08 PM--he was unable to log back to video.     Originating Location (pt. Location): Home    Distant Location (provider location):  On-site  Platform used for Video Visit: Ale    CHIEF COMPLAINT/REASON FOR VISIT   Nephrolithiasis follow up  Med refill    HISTORY OF PRESENT ILLNESS   Mr. Howard is a very pleasant 83-year-old gentleman, who presents today for follow-up regarding nephrolithiasis.      He also has a history of BPH.  He previously followed with Dr. Ross.  Also has a history of TURP.    Patient denies any recurrent episodes of nephrolithiasis.  He has not had any urinary tract infections, hematuria, dysuria, urgency, or frequency of urination.  He feels that he is emptying his bladder well.    Patient does have CKD stage III.    No longer using sildenafil as his partner is on [facility].     TODAY 9/27:  KUB without visible stones.   Urinary stream is unpredictable the past 6 wks. Now sitting to urinate. Seems to be getting better the past few days.   Nocturia x 2  No urgency or freq.       Patient Active Problem List   Diagnosis    Chronic kidney disease, stage III (moderate)    BPH (benign prostatic hyperplasia)    ACP (advance care planning)    Diabetes mellitus, type 2 (H)    Sepsis (H)    Generalized muscle weakness    Cellulitis of left lower extremity    Severe sepsis (H)    Generalized weakness      Past Medical History:   Diagnosis Date    Adenomatous colon polyp     Arrhythmia     Hx SVT/ablation    Wright esophagus     Cataract     Chronic kidney disease, stage III (moderate) (H)     Diabetes mellitus (H)     Type 2    Diarrhea     Diverticulitis     Dupuytren contracture     GERD (gastroesophageal reflux disease)     H/O prostatitis     Hyperlipidemia     Hypertension     Lesion of mouth     Major depression     Right inguinal hernia     Stones, bladder, diverticulum     Urethral stricture           Allergies   Allergen Reactions    Ciprofibrate GI Disturbance     with abd pain    Ciprofloxacin      Gi bleed     Past Surgical History:   Procedure Laterality Date    APPENDECTOMY      CARDIAC SURGERY      coronary ablation for SVT    COLONOSCOPY      CYSTOSCOPY  05/15/2019    Dr Ross     CYSTOSCOPY, TRANSURETHRAL RESECTION (TUR) PROSTATE, COMBINED N/A 2/3/2016    Procedure: COMBINED CYSTOSCOPY, TRANSURETHRAL RESECTION (TUR) PROSTATE;  Surgeon: Giovanny Ross MD;  Location:  OR    ENT SURGERY      nasal polyps, tonsilectomy    EXTRACORPOREAL SHOCK WAVE LITHOTRIPSY (ESWL)  10/9/2013    Procedure: EXTRACORPOREAL SHOCK WAVE LITHOTRIPSY (ESWL);  LEFT EXTRACORPOREAL SHOCK WAVE LITHOTRIPSY (ESWL) ;  Surgeon: Giovanny Ross MD;  Location:  OR    EYE SURGERY      retinal detachment repair    HERNIA REPAIR      L ing hernia repair    LAPAROSCOPIC HERNIORRHAPHY INGUINAL Right 11/10/2016    Procedure: LAPAROSCOPIC HERNIORRHAPHY INGUINAL;  Surgeon: Sebastian Hunt MD;  Location:  OR    PHACOEMULSIFICATION CLEAR CORNEA WITH STANDARD INTRAOCULAR LENS IMPLANT  4/3/2014    Procedure: PHACOEMULSIFICATION CLEAR CORNEA WITH STANDARD INTRAOCULAR LENS IMPLANT;  LEFT PHACOEMULSIFICATION CLEAR CORNEA WITH STANDARD INTRAOCULAR LENS IMPLANT ;  Surgeon: Keanu Pollock MD;  Location:  EC    PHACOEMULSIFICATION CLEAR CORNEA WITH STANDARD INTRAOCULAR LENS IMPLANT Right 9/8/2016    Procedure: PHACOEMULSIFICATION CLEAR CORNEA WITH STANDARD INTRAOCULAR LENS IMPLANT;  Surgeon: Keanu Pollock MD;  Location:  EC    VASECTOMY             Physical Exam   There were no vitals taken for this visit.  PSYCH: NAD  EYES: EOMI  MOUTH: MMM  NEURO: AAO x3  JOSIANE: normal tone, no masses, (medium) prostate without nodules or tenderness (last visit).      IMAGING   Narrative & Impression   XR KUB   9/25/2024 1:53 PM      HISTORY: Evaluate for kidney stones; Nephrolithiasis.     COMPARISON: CT 8/16/2024.                                                                       IMPRESSION: No visible urolithiasis. CT could provide more sensitive  assessment. Nonobstructive bowel. No free air.     ORQUIDEA MALDONADO MD           A/P:    1. BPH s/p TURP  2. ED  3. Nephrolithiasis    -follow-up in 1 year with KUB  -We discussed a trial of Flomax if his urinary symptoms do not resolve. He will update me in 1-2 wks.     Claudia Najera PA-C  [facility]  [phone]    25 minutes spent by me on the date of the encounter doing chart review, review of test results, interpretation of tests, patient visit, and documentation

## 2024-09-27 NOTE — NURSING NOTE
Is the patient currently in the state of [place]? YES    Visit mode:VIDEO    If the visit is dropped, the patient can be reconnected by: VIDEO VISIT: Send to e-mail at: [email or web address]    Will anyone else be joining the visit? NO  (If patient encounters technical issues they should call [phone])    How would you like to obtain your AVS? MyChart    Are changes needed to the allergy or medication list? No    Are refills needed on medications prescribed by this physician? NO    Reason for visit: Follow Up    Lupe GARCIA

## 2024-11-01 ENCOUNTER — HOSPITAL ENCOUNTER (OUTPATIENT)
Dept: ULTRASOUND IMAGING | Facility: CLINIC | Age: 84
Discharge: HOME | End: 2024-11-01
Attending: FAMILY MEDICINE
Payer: COMMERCIAL

## 2024-11-01 ENCOUNTER — HOSPITAL ENCOUNTER (OUTPATIENT)
Dept: GENERAL RADIOLOGY | Facility: CLINIC | Age: 84
Discharge: HOME | End: 2024-11-01
Attending: FAMILY MEDICINE
Payer: COMMERCIAL

## 2024-11-01 DIAGNOSIS — R59.0 CERVICAL LYMPHADENOPATHY: ICD-10-CM

## 2024-11-01 DIAGNOSIS — M54.2 NECK PAIN: ICD-10-CM

## 2024-11-01 PROCEDURE — 72040 X-RAY EXAM NECK SPINE 2-3 VW: CPT

## 2024-11-01 PROCEDURE — 76536 US EXAM OF HEAD AND NECK: CPT

## 2025-01-06 ENCOUNTER — HOSPITAL ENCOUNTER (OUTPATIENT)
Dept: ULTRASOUND IMAGING | Facility: CLINIC | Age: 85
Discharge: HOME | End: 2025-01-06
Attending: FAMILY MEDICINE | Admitting: FAMILY MEDICINE
Payer: COMMERCIAL

## 2025-01-06 DIAGNOSIS — R63.4 WEIGHT LOSS: ICD-10-CM

## 2025-01-06 PROCEDURE — 76536 US EXAM OF HEAD AND NECK: CPT

## 2025-02-22 ENCOUNTER — HEALTH MAINTENANCE LETTER (OUTPATIENT)
Age: 85
End: 2025-02-22

## 2025-03-22 ENCOUNTER — HEALTH MAINTENANCE LETTER (OUTPATIENT)
Age: 85
End: 2025-03-22

## 2025-06-26 DIAGNOSIS — N20.0 NEPHROLITHIASIS: Primary | ICD-10-CM

## 2025-08-16 ENCOUNTER — HEALTH MAINTENANCE LETTER (OUTPATIENT)
Age: 85
End: 2025-08-16